# Patient Record
Sex: FEMALE | Race: WHITE | NOT HISPANIC OR LATINO | Employment: OTHER | URBAN - METROPOLITAN AREA
[De-identification: names, ages, dates, MRNs, and addresses within clinical notes are randomized per-mention and may not be internally consistent; named-entity substitution may affect disease eponyms.]

---

## 2023-11-29 ENCOUNTER — TRANSCRIBE ORDERS (OUTPATIENT)
Dept: ADMINISTRATIVE | Facility: HOSPITAL | Age: 83
End: 2023-11-29
Payer: MEDICARE

## 2023-11-29 DIAGNOSIS — M25.551 HIP PAIN, RIGHT: ICD-10-CM

## 2023-11-29 DIAGNOSIS — R59.9 ENLARGED LYMPH NODE: Primary | ICD-10-CM

## 2023-12-04 ENCOUNTER — HOSPITAL ENCOUNTER (OUTPATIENT)
Dept: GENERAL RADIOLOGY | Facility: HOSPITAL | Age: 83
Discharge: HOME | End: 2023-12-04
Payer: MEDICARE

## 2023-12-04 ENCOUNTER — HOSPITAL ENCOUNTER (OUTPATIENT)
Dept: CT IMAGING | Facility: HOSPITAL | Age: 83
Discharge: HOME | End: 2023-12-04
Admitting: INTERNAL MEDICINE
Payer: MEDICARE

## 2023-12-04 DIAGNOSIS — R59.9 ENLARGED LYMPH NODE: ICD-10-CM

## 2023-12-04 DIAGNOSIS — M25.551 HIP PAIN, RIGHT: ICD-10-CM

## 2023-12-04 PROCEDURE — 73502 X-RAY EXAM HIP UNI 2-3 VIEWS: CPT

## 2023-12-04 PROCEDURE — 70490 CT SOFT TISSUE NECK W/O DYE: CPT

## 2024-06-04 ENCOUNTER — TRANSCRIBE ORDERS (OUTPATIENT)
Dept: ADMINISTRATIVE | Facility: HOSPITAL | Age: 84
End: 2024-06-04
Payer: MEDICARE

## 2024-06-04 DIAGNOSIS — I73.9 CLAUDICATION, INTERMITTENT: ICD-10-CM

## 2024-06-04 DIAGNOSIS — I73.9 CLAUDICATION, INTERMITTENT: Primary | ICD-10-CM

## 2024-06-04 DIAGNOSIS — Z13.9 SCREENING DUE: Primary | ICD-10-CM

## 2024-06-07 ENCOUNTER — TRANSCRIBE ORDERS (OUTPATIENT)
Dept: ADMINISTRATIVE | Facility: HOSPITAL | Age: 84
End: 2024-06-07
Payer: MEDICARE

## 2024-06-07 DIAGNOSIS — I73.9 PERIPHERAL VASCULAR DISEASE, UNSPECIFIED: Primary | ICD-10-CM

## 2024-08-15 ENCOUNTER — OFFICE VISIT (OUTPATIENT)
Dept: CARDIOLOGY | Facility: CLINIC | Age: 84
End: 2024-08-15
Payer: MEDICARE

## 2024-08-15 VITALS
WEIGHT: 115 LBS | HEART RATE: 88 BPM | OXYGEN SATURATION: 95 % | BODY MASS INDEX: 21.16 KG/M2 | SYSTOLIC BLOOD PRESSURE: 135 MMHG | HEIGHT: 62 IN | DIASTOLIC BLOOD PRESSURE: 76 MMHG

## 2024-08-15 DIAGNOSIS — I10 ESSENTIAL HYPERTENSION: ICD-10-CM

## 2024-08-15 DIAGNOSIS — R06.02 SHORTNESS OF BREATH: Primary | ICD-10-CM

## 2024-08-15 PROCEDURE — 93000 ELECTROCARDIOGRAM COMPLETE: CPT | Performed by: INTERNAL MEDICINE

## 2024-08-15 PROCEDURE — 1160F RVW MEDS BY RX/DR IN RCRD: CPT | Performed by: INTERNAL MEDICINE

## 2024-08-15 PROCEDURE — 1159F MED LIST DOCD IN RCRD: CPT | Performed by: INTERNAL MEDICINE

## 2024-08-15 PROCEDURE — 99204 OFFICE O/P NEW MOD 45 MIN: CPT | Performed by: INTERNAL MEDICINE

## 2024-08-15 RX ORDER — ESCITALOPRAM OXALATE 20 MG/1
TABLET ORAL
COMMUNITY
Start: 2024-06-29

## 2024-08-15 RX ORDER — UREA 10 %
500 LOTION (ML) TOPICAL DAILY
COMMUNITY

## 2024-08-15 RX ORDER — AMLODIPINE BESYLATE 10 MG/1
TABLET ORAL
COMMUNITY
Start: 2024-06-29

## 2024-08-15 NOTE — PROGRESS NOTES
Encounter Date:08/15/2024      Patient ID: Madelyn Madrid is a 83 y.o. female.    Chief Complaint:  Shortness of breath  Hypertension    History of present illness  Patient lately has been having shortness of breath with or without activity.  Fatigue.  Denies having any chest pain.    Patient is not having any chest discomfort palpitations, dizziness or syncope.  Denies having any headache ,abdominal pain ,nausea, vomiting , diarrhea constipation, loss of weight or loss of appetite.  Denies having any excessive bruising ,hematuria or blood in the stool.    Review of all systems negative except as indicated.    Reviewed ROS.    Assessment and Plan     [[[[[[[[[[[[[[[[[[  Impression  ==========  -Shortness of breath and fatigue    - Hypertension    - Status post hysterectomy    - Family history of Alzheimer's disease    - Former smoker    - No known allergies  ============  Plan  ===========  Shortness of breath and fatigue.  EKG showed sinus rhythm left anterior fascicular block-8/15/2024.  Shortness of breath could be due to pulmonary issues.  Patient is a former smoker.    Hypertension-135/76.    Medications were reviewed and updated.    Stress Cardiolite test  Echocardiogram    Follow-up in the office on the same day or soon after.    Further plan will depend on patient's progress.   ]]]]]]]]]]]]]]]]]             Diagnosis Plan   1. Shortness of breath  Adult Transthoracic Echo Complete W/ Cont if Necessary Per Protocol    Stress Test With Myocardial Perfusion One Day      2. Essential hypertension  Adult Transthoracic Echo Complete W/ Cont if Necessary Per Protocol    Stress Test With Myocardial Perfusion One Day      LAB RESULTS (LAST 7 DAYS)    CBC        BMP        CMP         BNP        TROPONIN        CoAg        Creatinine Clearance  CrCl cannot be calculated (No successful lab value found.).    ABG        Radiology  No radiology results for the last day                The following portions of the  patient's history were reviewed and updated as appropriate: allergies, current medications, past family history, past medical history, past social history, past surgical history, and problem list.    Review of Systems   Constitutional: Positive for malaise/fatigue.   Cardiovascular:  Negative for chest pain, dyspnea on exertion, leg swelling and palpitations.   Respiratory:  Positive for shortness of breath. Negative for cough.    Gastrointestinal:  Negative for abdominal pain, nausea and vomiting.   Neurological:  Positive for dizziness, light-headedness and weakness. Negative for focal weakness, headaches and numbness.   All other systems reviewed and are negative.        Current Outpatient Medications:   •  amLODIPine (NORVASC) 10 MG tablet, , Disp: , Rfl:   •  Calcium Citrate-Vitamin D (Citrus Calcium/Vitamin D) 200-6.25 MG-MCG tablet, Take  by mouth., Disp: , Rfl:   •  escitalopram (LEXAPRO) 20 MG tablet, , Disp: , Rfl:   •  vitamin B-12 (CYANOCOBALAMIN) 500 MCG tablet, Take 1 tablet by mouth Daily., Disp: , Rfl:     No Known Allergies    Family History   Problem Relation Age of Onset   • Rectal cancer Mother    • Alzheimer's disease Mother    • Cancer Father    • Alzheimer's disease Sister    • Alzheimer's disease Brother    • Epilepsy Brother        Past Surgical History:   Procedure Laterality Date   • HYSTERECTOMY     • LAPAROSCOPY HYSTEROSCOPY ENDOMETRIAL ABLATION         Past Medical History:   Diagnosis Date   • Cancer    • Depression    • Goiter    • Hypertension    • Pancreatitis        Family History   Problem Relation Age of Onset   • Rectal cancer Mother    • Alzheimer's disease Mother    • Cancer Father    • Alzheimer's disease Sister    • Alzheimer's disease Brother    • Epilepsy Brother        Social History     Socioeconomic History   • Marital status: Legally    Tobacco Use   • Smoking status: Former     Types: Cigarettes     Passive exposure: Past   • Smokeless tobacco: Never   Vaping  "Use   • Vaping status: Never Used   Substance and Sexual Activity   • Alcohol use: Defer   • Drug use: Defer   • Sexual activity: Defer           ECG 12 Lead    Date/Time: 8/15/2024 3:15 PM  Performed by: Treva Grullon MD    Authorized by: Treva Grullon MD  Comparison: not compared with previous ECG   Comments: Normal sinus rhythm nonspecific ST-T wave changes left anterior fascicular block 84/min no ectopy no prior tracing for comparison.        Objective:       Physical Exam    /76 (BP Location: Left arm, Patient Position: Sitting, Cuff Size: Adult)   Pulse 88   Ht 157.5 cm (62\")   Wt 52.2 kg (115 lb)   SpO2 95%   BMI 21.03 kg/m²   The patient is alert, oriented and in no distress.    Vital signs as noted above.    Head and neck revealed no carotid bruits or jugular venous distension.  No thyromegaly or lymphadenopathy is present.    Lungs clear.  No wheezing.  Breath sounds are normal bilaterally.    Heart normal first and second heart sounds.  No murmur..  No pericardial rub is present.  No gallop is present.    Abdomen soft and nontender.  No organomegaly is present.    Extremities revealed good peripheral pulses without any pedal edema.    Skin warm and dry.    Musculoskeletal system is grossly normal.    CNS grossly normal.    Reviewed and updated.        "

## 2024-09-16 ENCOUNTER — HOSPITAL ENCOUNTER (OUTPATIENT)
Dept: CARDIOLOGY | Facility: HOSPITAL | Age: 84
Discharge: HOME | End: 2024-09-16
Payer: MEDICARE

## 2024-09-16 VITALS — HEIGHT: 62 IN | WEIGHT: 115 LBS | BODY MASS INDEX: 21.16 KG/M2

## 2024-09-16 DIAGNOSIS — I10 ESSENTIAL HYPERTENSION: ICD-10-CM

## 2024-09-16 DIAGNOSIS — R06.02 SHORTNESS OF BREATH: ICD-10-CM

## 2024-09-16 LAB
BH CV ECHO LEFT VENTRICLE GLOBAL LONGITUDINAL STRAIN: 16.8 %
BH CV ECHO MEAS - ACS: 1.2 CM
BH CV ECHO MEAS - AO MAX PG: 9.2 MMHG
BH CV ECHO MEAS - AO MEAN PG: 4.5 MMHG
BH CV ECHO MEAS - AO ROOT DIAM: 2.6 CM
BH CV ECHO MEAS - AO V2 MAX: 144.9 CM/SEC
BH CV ECHO MEAS - AO V2 VTI: 26.2 CM
BH CV ECHO MEAS - AVA(I,D): 1.94 CM2
BH CV ECHO MEAS - EDV(CUBED): 80.6 ML
BH CV ECHO MEAS - EDV(MOD-SP4): 82.1 ML
BH CV ECHO MEAS - EF(MOD-BP): 63 %
BH CV ECHO MEAS - EF(MOD-SP4): 62.9 %
BH CV ECHO MEAS - ESV(CUBED): 19.7 ML
BH CV ECHO MEAS - ESV(MOD-SP4): 30.5 ML
BH CV ECHO MEAS - FS: 37.5 %
BH CV ECHO MEAS - IVS/LVPW: 2.6 CM
BH CV ECHO MEAS - IVSD: 1.77 CM
BH CV ECHO MEAS - LA DIMENSION: 3.3 CM
BH CV ECHO MEAS - LV MASS(C)D: 192.3 GRAMS
BH CV ECHO MEAS - LV MAX PG: 4.4 MMHG
BH CV ECHO MEAS - LV MEAN PG: 2.11 MMHG
BH CV ECHO MEAS - LV V1 MAX: 104.6 CM/SEC
BH CV ECHO MEAS - LV V1 VTI: 21.2 CM
BH CV ECHO MEAS - LVIDD: 4.3 CM
BH CV ECHO MEAS - LVIDS: 2.7 CM
BH CV ECHO MEAS - LVOT AREA: 2.39 CM2
BH CV ECHO MEAS - LVOT DIAM: 1.75 CM
BH CV ECHO MEAS - LVPWD: 0.69 CM
BH CV ECHO MEAS - MR MAX PG: 78.8 MMHG
BH CV ECHO MEAS - MR MAX VEL: 443.8 CM/SEC
BH CV ECHO MEAS - MV A MAX VEL: 98.3 CM/SEC
BH CV ECHO MEAS - MV DEC SLOPE: 312.2 CM/SEC2
BH CV ECHO MEAS - MV DEC TIME: 0.24 SEC
BH CV ECHO MEAS - MV E MAX VEL: 75.7 CM/SEC
BH CV ECHO MEAS - MV E/A: 0.77
BH CV ECHO MEAS - MV MAX PG: 6.3 MMHG
BH CV ECHO MEAS - MV MEAN PG: 3.4 MMHG
BH CV ECHO MEAS - MV V2 VTI: 32.2 CM
BH CV ECHO MEAS - MVA(VTI): 1.58 CM2
BH CV ECHO MEAS - PA ACC TIME: 0.12 SEC
BH CV ECHO MEAS - PA V2 MAX: 118 CM/SEC
BH CV ECHO MEAS - QP/QS: 1.64
BH CV ECHO MEAS - RAP SYSTOLE: 5 MMHG
BH CV ECHO MEAS - RV MAX PG: 3.1 MMHG
BH CV ECHO MEAS - RV V1 MAX: 88.3 CM/SEC
BH CV ECHO MEAS - RV V1 VTI: 17.3 CM
BH CV ECHO MEAS - RVDD: 2.39 CM
BH CV ECHO MEAS - RVOT DIAM: 2.48 CM
BH CV ECHO MEAS - RVSP: 35 MMHG
BH CV ECHO MEAS - SV(LVOT): 50.9 ML
BH CV ECHO MEAS - SV(MOD-SP4): 51.6 ML
BH CV ECHO MEAS - SV(RVOT): 83.5 ML
BH CV ECHO MEAS - TR MAX PG: 29.7 MMHG
BH CV ECHO MEAS - TR MAX VEL: 272.4 CM/SEC
BH CV REST NUCLEAR ISOTOPE DOSE: 10.1 MCI
BH CV STRESS BP STAGE 1: NORMAL
BH CV STRESS COMMENTS STAGE 1: NORMAL
BH CV STRESS DOSE REGADENOSON STAGE 1: 0.4
BH CV STRESS DURATION MIN STAGE 1: 0
BH CV STRESS DURATION SEC STAGE 1: 10
BH CV STRESS HR STAGE 1: 81
BH CV STRESS NUCLEAR ISOTOPE DOSE: 31.8 MCI
BH CV STRESS PROTOCOL 1: NORMAL
BH CV STRESS RECOVERY BP: NORMAL MMHG
BH CV STRESS RECOVERY HR: 102 BPM
BH CV STRESS STAGE 1: 1
MAXIMAL PREDICTED HEART RATE: 138 BPM
STRESS BASELINE BP: NORMAL MMHG
STRESS BASELINE HR: 81 BPM
STRESS TARGET HR: 117 BPM

## 2024-09-16 PROCEDURE — 93016 CV STRESS TEST SUPVJ ONLY: CPT | Performed by: INTERNAL MEDICINE

## 2024-09-16 PROCEDURE — 93356 MYOCRD STRAIN IMG SPCKL TRCK: CPT | Performed by: INTERNAL MEDICINE

## 2024-09-16 PROCEDURE — 93356 MYOCRD STRAIN IMG SPCKL TRCK: CPT

## 2024-09-16 PROCEDURE — 25010000002 REGADENOSON 0.4 MG/5ML SOLUTION: Performed by: INTERNAL MEDICINE

## 2024-09-16 PROCEDURE — 0 TECHNETIUM SESTAMIBI: Performed by: INTERNAL MEDICINE

## 2024-09-16 PROCEDURE — 93306 TTE W/DOPPLER COMPLETE: CPT

## 2024-09-16 PROCEDURE — A9500 TC99M SESTAMIBI: HCPCS | Performed by: INTERNAL MEDICINE

## 2024-09-16 PROCEDURE — 93018 CV STRESS TEST I&R ONLY: CPT | Performed by: INTERNAL MEDICINE

## 2024-09-16 PROCEDURE — 78452 HT MUSCLE IMAGE SPECT MULT: CPT

## 2024-09-16 PROCEDURE — 93306 TTE W/DOPPLER COMPLETE: CPT | Performed by: INTERNAL MEDICINE

## 2024-09-16 PROCEDURE — 78452 HT MUSCLE IMAGE SPECT MULT: CPT | Performed by: INTERNAL MEDICINE

## 2024-09-16 PROCEDURE — 93017 CV STRESS TEST TRACING ONLY: CPT

## 2024-09-16 RX ORDER — REGADENOSON 0.08 MG/ML
0.4 INJECTION, SOLUTION INTRAVENOUS
Status: COMPLETED | OUTPATIENT
Start: 2024-09-16 | End: 2024-09-16

## 2024-09-16 RX ADMIN — TECHNETIUM TC 99M SESTAMIBI 1 DOSE: 1 INJECTION INTRAVENOUS at 14:22

## 2024-09-16 RX ADMIN — REGADENOSON 0.4 MG: 0.08 INJECTION, SOLUTION INTRAVENOUS at 14:21

## 2024-09-16 RX ADMIN — TECHNETIUM TC 99M SESTAMIBI 1 DOSE: 1 INJECTION INTRAVENOUS at 12:20

## 2024-09-24 ENCOUNTER — TELEPHONE (OUTPATIENT)
Dept: ONCOLOGY | Facility: CLINIC | Age: 84
End: 2024-09-24
Payer: MEDICARE

## 2024-09-25 ENCOUNTER — LAB (OUTPATIENT)
Dept: LAB | Facility: HOSPITAL | Age: 84
End: 2024-09-25
Payer: MEDICARE

## 2024-09-25 ENCOUNTER — CONSULT (OUTPATIENT)
Dept: ONCOLOGY | Facility: CLINIC | Age: 84
End: 2024-09-25
Payer: MEDICARE

## 2024-09-25 VITALS
BODY MASS INDEX: 20.13 KG/M2 | HEIGHT: 62 IN | HEART RATE: 94 BPM | DIASTOLIC BLOOD PRESSURE: 86 MMHG | RESPIRATION RATE: 14 BRPM | TEMPERATURE: 98 F | SYSTOLIC BLOOD PRESSURE: 140 MMHG | WEIGHT: 109.4 LBS | OXYGEN SATURATION: 95 %

## 2024-09-25 DIAGNOSIS — S49.009A: ICD-10-CM

## 2024-09-25 DIAGNOSIS — M89.9 LYTIC BONE LESIONS ON XRAY: Primary | ICD-10-CM

## 2024-09-25 DIAGNOSIS — M89.9 LYTIC BONE LESIONS ON XRAY: ICD-10-CM

## 2024-09-25 LAB
ALBUMIN SERPL-MCNC: 4.1 G/DL (ref 3.5–5.2)
ALBUMIN/GLOB SERPL: 1.1 G/DL
ALP SERPL-CCNC: 105 U/L (ref 39–117)
ALT SERPL W P-5'-P-CCNC: <5 U/L (ref 1–33)
ANION GAP SERPL CALCULATED.3IONS-SCNC: 11.4 MMOL/L (ref 5–15)
AST SERPL-CCNC: 18 U/L (ref 1–32)
BASOPHILS # BLD AUTO: 0.02 10*3/MM3 (ref 0–0.2)
BASOPHILS NFR BLD AUTO: 0.3 % (ref 0–1.5)
BILIRUB SERPL-MCNC: 0.3 MG/DL (ref 0–1.2)
BUN SERPL-MCNC: 11 MG/DL (ref 8–23)
BUN/CREAT SERPL: 11.2 (ref 7–25)
CALCIUM SPEC-SCNC: 9.6 MG/DL (ref 8.6–10.5)
CHLORIDE SERPL-SCNC: 101 MMOL/L (ref 98–107)
CO2 SERPL-SCNC: 28.6 MMOL/L (ref 22–29)
CREAT SERPL-MCNC: 0.98 MG/DL (ref 0.57–1)
DEPRECATED RDW RBC AUTO: 42.4 FL (ref 37–54)
EGFRCR SERPLBLD CKD-EPI 2021: 57.7 ML/MIN/1.73
EOSINOPHIL # BLD AUTO: 0.03 10*3/MM3 (ref 0–0.4)
EOSINOPHIL NFR BLD AUTO: 0.4 % (ref 0.3–6.2)
ERYTHROCYTE [DISTWIDTH] IN BLOOD BY AUTOMATED COUNT: 13.9 % (ref 12.3–15.4)
GLOBULIN UR ELPH-MCNC: 3.6 GM/DL
GLUCOSE SERPL-MCNC: 105 MG/DL (ref 65–99)
HCT VFR BLD AUTO: 39.1 % (ref 34–46.6)
HGB BLD-MCNC: 12.1 G/DL (ref 12–15.9)
LYMPHOCYTES # BLD AUTO: 1.15 10*3/MM3 (ref 0.7–3.1)
LYMPHOCYTES NFR BLD AUTO: 15.5 % (ref 19.6–45.3)
MCH RBC QN AUTO: 26.4 PG (ref 26.6–33)
MCHC RBC AUTO-ENTMCNC: 30.9 G/DL (ref 31.5–35.7)
MCV RBC AUTO: 85.2 FL (ref 79–97)
MONOCYTES # BLD AUTO: 0.81 10*3/MM3 (ref 0.1–0.9)
MONOCYTES NFR BLD AUTO: 10.9 % (ref 5–12)
NEUTROPHILS NFR BLD AUTO: 5.42 10*3/MM3 (ref 1.7–7)
NEUTROPHILS NFR BLD AUTO: 72.9 % (ref 42.7–76)
PLATELET # BLD AUTO: 385 10*3/MM3 (ref 140–450)
PMV BLD AUTO: 9 FL (ref 6–12)
POTASSIUM SERPL-SCNC: 3.5 MMOL/L (ref 3.5–5.2)
PROT SERPL-MCNC: 7.7 G/DL (ref 6–8.5)
RBC # BLD AUTO: 4.59 10*6/MM3 (ref 3.77–5.28)
SODIUM SERPL-SCNC: 141 MMOL/L (ref 136–145)
WBC NRBC COR # BLD AUTO: 7.43 10*3/MM3 (ref 3.4–10.8)

## 2024-09-25 PROCEDURE — 85025 COMPLETE CBC W/AUTO DIFF WBC: CPT | Performed by: INTERNAL MEDICINE

## 2024-09-25 PROCEDURE — 36415 COLL VENOUS BLD VENIPUNCTURE: CPT

## 2024-09-25 PROCEDURE — 80053 COMPREHEN METABOLIC PANEL: CPT | Performed by: INTERNAL MEDICINE

## 2024-09-25 RX ORDER — MELOXICAM 7.5 MG/1
7.5 TABLET ORAL DAILY
COMMUNITY

## 2024-09-26 ENCOUNTER — TELEPHONE (OUTPATIENT)
Dept: ONCOLOGY | Facility: CLINIC | Age: 84
End: 2024-09-26
Payer: MEDICARE

## 2024-09-26 LAB
ALBUMIN SERPL ELPH-MCNC: 3.3 G/DL (ref 2.9–4.4)
ALBUMIN/GLOB SERPL: 0.9 {RATIO} (ref 0.7–1.7)
ALPHA1 GLOB SERPL ELPH-MCNC: 0.4 G/DL (ref 0–0.4)
ALPHA2 GLOB SERPL ELPH-MCNC: 1.1 G/DL (ref 0.4–1)
B-GLOBULIN SERPL ELPH-MCNC: 1 G/DL (ref 0.7–1.3)
GAMMA GLOB SERPL ELPH-MCNC: 1.5 G/DL (ref 0.4–1.8)
GLOBULIN SER-MCNC: 4 G/DL (ref 2.2–3.9)
IGA SERPL-MCNC: 236 MG/DL (ref 64–422)
IGG SERPL-MCNC: 1464 MG/DL (ref 586–1602)
IGM SERPL-MCNC: 307 MG/DL (ref 26–217)
INTERPRETATION SERPL IEP-IMP: ABNORMAL
KAPPA LC FREE SER-MCNC: 66.8 MG/L (ref 3.3–19.4)
KAPPA LC FREE/LAMBDA FREE SER: 1.42 {RATIO} (ref 0.26–1.65)
LABORATORY COMMENT REPORT: ABNORMAL
LAMBDA LC FREE SERPL-MCNC: 47.2 MG/L (ref 5.7–26.3)
M PROTEIN SERPL ELPH-MCNC: ABNORMAL G/DL
PROT SERPL-MCNC: 7.3 G/DL (ref 6–8.5)

## 2024-09-27 ENCOUNTER — TELEPHONE (OUTPATIENT)
Dept: ONCOLOGY | Facility: CLINIC | Age: 84
End: 2024-09-27
Payer: MEDICARE

## 2024-09-30 ENCOUNTER — OFFICE VISIT (OUTPATIENT)
Dept: CARDIOLOGY | Facility: CLINIC | Age: 84
End: 2024-09-30
Payer: MEDICARE

## 2024-09-30 VITALS
SYSTOLIC BLOOD PRESSURE: 128 MMHG | OXYGEN SATURATION: 98 % | WEIGHT: 110 LBS | DIASTOLIC BLOOD PRESSURE: 74 MMHG | HEIGHT: 62 IN | BODY MASS INDEX: 20.24 KG/M2 | HEART RATE: 92 BPM

## 2024-09-30 DIAGNOSIS — I10 ESSENTIAL HYPERTENSION: ICD-10-CM

## 2024-09-30 DIAGNOSIS — R06.02 SHORTNESS OF BREATH: Primary | ICD-10-CM

## 2024-09-30 PROCEDURE — 1159F MED LIST DOCD IN RCRD: CPT | Performed by: INTERNAL MEDICINE

## 2024-09-30 PROCEDURE — 99214 OFFICE O/P EST MOD 30 MIN: CPT | Performed by: INTERNAL MEDICINE

## 2024-09-30 PROCEDURE — 1160F RVW MEDS BY RX/DR IN RCRD: CPT | Performed by: INTERNAL MEDICINE

## 2024-09-30 RX ORDER — ACETAMINOPHEN 325 MG/1
650 TABLET ORAL AS NEEDED
COMMUNITY

## 2024-10-01 ENCOUNTER — APPOINTMENT (OUTPATIENT)
Dept: NUCLEAR MEDICINE | Facility: HOSPITAL | Age: 84
End: 2024-10-01
Payer: MEDICARE

## 2024-10-02 ENCOUNTER — HOSPITAL ENCOUNTER (OUTPATIENT)
Dept: CT IMAGING | Facility: HOSPITAL | Age: 84
Discharge: HOME | End: 2024-10-02
Admitting: INTERNAL MEDICINE
Payer: MEDICARE

## 2024-10-02 ENCOUNTER — HOSPITAL ENCOUNTER (OUTPATIENT)
Dept: NUCLEAR MEDICINE | Facility: HOSPITAL | Age: 84
Discharge: HOME | End: 2024-10-02
Payer: MEDICARE

## 2024-10-02 DIAGNOSIS — S49.009A: ICD-10-CM

## 2024-10-02 DIAGNOSIS — M89.9 LYTIC BONE LESIONS ON XRAY: ICD-10-CM

## 2024-10-02 PROCEDURE — 78306 BONE IMAGING WHOLE BODY: CPT

## 2024-10-02 PROCEDURE — 25510000001 IOPAMIDOL PER 1 ML: Performed by: INTERNAL MEDICINE

## 2024-10-02 PROCEDURE — 71260 CT THORAX DX C+: CPT

## 2024-10-02 PROCEDURE — 74177 CT ABD & PELVIS W/CONTRAST: CPT

## 2024-10-02 PROCEDURE — A9503 TC99M MEDRONATE: HCPCS | Performed by: INTERNAL MEDICINE

## 2024-10-02 PROCEDURE — 0 TECHNETIUM MEDRONATE KIT: Performed by: INTERNAL MEDICINE

## 2024-10-02 RX ORDER — IOPAMIDOL 755 MG/ML
100 INJECTION, SOLUTION INTRAVASCULAR
Status: COMPLETED | OUTPATIENT
Start: 2024-10-02 | End: 2024-10-02

## 2024-10-02 RX ORDER — TC 99M MEDRONATE 20 MG/10ML
27.3 INJECTION, POWDER, LYOPHILIZED, FOR SOLUTION INTRAVENOUS
Status: COMPLETED | OUTPATIENT
Start: 2024-10-02 | End: 2024-10-02

## 2024-10-02 RX ADMIN — TC 99M MEDRONATE 27.3 MILLICURIE: 20 INJECTION, POWDER, LYOPHILIZED, FOR SOLUTION INTRAVENOUS at 09:00

## 2024-10-02 RX ADMIN — IOPAMIDOL 80 ML: 755 INJECTION, SOLUTION INTRAVENOUS at 10:41

## 2024-10-08 NOTE — PROGRESS NOTES
HEMATOLOGY ONCOLOGY OUTPATIENT FOLLOWUP       Patient name: Madelyn Madrid  : 1940  MRN: [id number]  Primary Care Physician: Therese Martinez MD  Referring Physician: No ref. provider found  Reason For Consult: Pathologic fracture of the right humerus.    History of Present Illness:  Patient is a 84 y.o.     2024: In the office for the first time to investigate a pathologic fracture of the right humerus.  She dated the beginning of her present illness to sometime approximately a month to 2 months before this visit.  She started to have some discomfort, initially in the right hip and eventually in the right upper extremity.  It was difficult to localize the pain and it was not very intense until 1 morning, when she woke up with very intense pain in the right upper extremity, radiating to the chest.  She went to the primary care's office where she had some x-rays of the chest.  This reported nothing.  Eventually she sought attention from a different office where an x-ray of the right upper extremity was obtained and this time and expansile lesion was documented in the proximal right humerus, with an associated nondisplaced pathologic fracture.  On the right questioning she denied fevers.  She admitted to some nocturnal diaphoresis but it was not particularly worse at the time of this visit.  She had lost some weight and reported between 10 and 15 pounds in the previous 4 to 8 weeks.  She did not have any nausea or vomiting but admitted to some anorexia.  No chest pains, increasing cough or dyspnea.  No changes in her breast.  Denied abdominal pain.  Had not had melena or hematochezia and denied hematuria.  No edema.  She reported that her last mammogram had been many years before.  She had not had a colonoscopy, as well in many years.  She had had a hysterectomy for endometrial cancer more than 20 years before this visit.  On exam she appeared well-built and well-oriented.  She  did not seem ill or in distress.  No jaundice or pallor.  The breasts were symmetrically pendulous.  No ulceration or dimpling of the skin.  No nipple discharge on either side.  Palpation did not disclose a discrete nodule or tumor.  Lungs diminished bilaterally and heart regular.  Abdomen scaphoid.  Soft.  Not tender to palpation.  Liver and spleen not enlarged.  No edema.  The laboratory exams were reviewed.  I reviewed the notes and imaging studies from primary care.  Discussed with her.  Additional investigations will be obtained.    10/9/2024: In the office for follow up and to review the laboratory and imaging studies. She does not feel as well as before. She has had more pain and now complains of some discomfort in the mid back. She has been eating well. No fevers. Denies chest pain or abdominal pain. No diarrhea or dysuria. On exam appears chronically ill. No distress. No jaundice or pallor. Lungs diminished bilaterally and heart regular. Abdomen soft and without hepatomegaly or splenomegaly. No edema. Reviewed the laboratory exams. Reviewed the images and the reports of the imaging studies. Reviewed the images with her and her spouse. My clinical impression is that indeed she has metastatic lung cancer, however, metastatic endometrial cancer  is also a possibility. Needs a biopsy and have referred her to Dr. Iqbal for bronchoscopic biopsy. Prescribed tramadol and gave instructions. To see me with results.     Past Medical History:   Diagnosis Date    Depression     Endometrial cancer     Goiter     Hypertension     Pancreatitis      Past Surgical History:   Procedure Laterality Date    HYSTERECTOMY      LAPAROSCOPY HYSTEROSCOPY ENDOMETRIAL ABLATION         Current Outpatient Medications:     acetaminophen (Tylenol) 325 MG tablet, Take 2 tablets by mouth As Needed for Mild Pain., Disp: , Rfl:     amLODIPine (NORVASC) 10 MG tablet, , Disp: , Rfl:     escitalopram (LEXAPRO) 20 MG tablet, , Disp: , Rfl:      OMEPRAZOLE PO, Take  by mouth., Disp: , Rfl:     Calcium Citrate-Vitamin D (Citrus Calcium/Vitamin D) 200-6.25 MG-MCG tablet, Take  by mouth. (Patient not taking: Reported on 10/9/2024), Disp: , Rfl:     meloxicam (MOBIC) 7.5 MG tablet, Take 1 tablet by mouth Daily. (Patient not taking: Reported on 10/9/2024), Disp: , Rfl:     traMADol (ULTRAM) 50 MG tablet, Take 1 tablet by mouth Every 6 (Six) Hours As Needed for Moderate Pain., Disp: 120 tablet, Rfl: 0    vitamin B-12 (CYANOCOBALAMIN) 500 MCG tablet, Take 1 tablet by mouth Daily. (Patient not taking: Reported on 10/9/2024), Disp: , Rfl:     Allergies   Allergen Reactions    Other GI Intolerance     Opioids--     Family History   Problem Relation Age of Onset    Rectal cancer Mother     Alzheimer's disease Mother     Alzheimer's disease Sister     Alzheimer's disease Brother     Epilepsy Brother      Cancer-related family history includes Rectal cancer in her mother.    Social History     Tobacco Use    Smoking status: Former     Current packs/day: 0.00     Average packs/day: 1 pack/day for 40.0 years (40.0 ttl pk-yrs)     Types: Cigarettes     Start date:      Quit date:      Years since quittin.7     Passive exposure: Past    Smokeless tobacco: Never   Vaping Use    Vaping status: Never Used   Substance Use Topics    Alcohol use: Defer    Drug use: Defer     Social History     Social History Narrative    Not on file     ROS:   Review of Systems   Constitutional:  Positive for appetite change and unexpected weight change. Negative for activity change, chills, diaphoresis, fatigue and fever.   HENT:  Negative for congestion, dental problem, drooling, ear discharge, ear pain, facial swelling, hearing loss, mouth sores, nosebleeds, postnasal drip, rhinorrhea, sinus pressure, sinus pain, sneezing, sore throat, tinnitus, trouble swallowing and voice change.    Eyes:  Negative for photophobia, pain, discharge, redness, itching and visual disturbance.  "  Respiratory:  Negative for apnea, cough, choking, chest tightness, shortness of breath, wheezing and stridor.    Cardiovascular:  Negative for chest pain, palpitations and leg swelling.   Gastrointestinal:  Negative for abdominal distention, abdominal pain, anal bleeding, blood in stool, constipation, diarrhea, nausea, rectal pain and vomiting.   Endocrine: Negative for cold intolerance, heat intolerance, polydipsia and polyuria.   Genitourinary:  Negative for decreased urine volume, difficulty urinating, dysuria, flank pain, frequency, genital sores, hematuria and urgency.   Musculoskeletal:  Positive for arthralgias and myalgias. Negative for back pain, gait problem, joint swelling, neck pain and neck stiffness.   Skin:  Negative for color change, pallor and rash.   Neurological:  Negative for dizziness, tremors, seizures, syncope, facial asymmetry, speech difficulty, weakness, light-headedness, numbness and headaches.   Hematological:  Negative for adenopathy. Does not bruise/bleed easily.   Psychiatric/Behavioral:  Negative for agitation, behavioral problems, confusion, decreased concentration, hallucinations, self-injury, sleep disturbance and suicidal ideas. The patient is not nervous/anxious.      Objective:    Vital Signs:  Vitals:    10/09/24 1343   BP: 135/84   Pulse: 107   Resp: 14   Temp: 98.4 °F (36.9 °C)   SpO2: 96%   Weight: 49.7 kg (109 lb 9.6 oz)   Height: 157.5 cm (62\")   PainSc: 0-No pain     Body mass index is 20.05 kg/m².    ECOG  (1) Restricted in physically strenuous activity, ambulatory and able to do work of light nature    Physical Exam:   Physical Exam  Constitutional:       General: She is not in acute distress.     Appearance: She is not ill-appearing, toxic-appearing or diaphoretic.   HENT:      Head: Normocephalic and atraumatic.      Right Ear: External ear normal.      Left Ear: External ear normal.      Nose: Nose normal.      Mouth/Throat:      Mouth: Mucous membranes are moist. "      Pharynx: Oropharynx is clear. No oropharyngeal exudate or posterior oropharyngeal erythema.   Eyes:      General: No scleral icterus.        Right eye: No discharge.         Left eye: No discharge.      Conjunctiva/sclera: Conjunctivae normal.      Pupils: Pupils are equal, round, and reactive to light.   Cardiovascular:      Rate and Rhythm: Normal rate and regular rhythm.      Pulses: Normal pulses.      Heart sounds: No murmur heard.     No friction rub. No gallop.   Pulmonary:      Effort: No respiratory distress.      Breath sounds: No stridor. No wheezing, rhonchi or rales.      Comments: Ill appearing and slender woman. No distress.   Chest:      Comments: Breasts symmetrical he pendulous without ulceration or dimpling of the skin.  No nipple lesions or discharge.  Palpation disclosed no discrete tumors.  No axillary adenopathy on either side.  Abdominal:      General: Bowel sounds are normal. There is no distension.      Palpations: Abdomen is soft. There is no mass.      Tenderness: There is no abdominal tenderness. There is no right CVA tenderness, left CVA tenderness, guarding or rebound.      Hernia: No hernia is present.      Comments: Scaphoid, soft, not tender.  Liver and spleen not seemingly enlarged.   Musculoskeletal:         General: No tenderness, deformity or signs of injury.      Cervical back: No rigidity.      Right lower leg: No edema.      Left lower leg: No edema.      Comments: Reduced mobility of the right upper extremity.   Lymphadenopathy:      Cervical: No cervical adenopathy.   Skin:     Coloration: Skin is not jaundiced or pale.      Findings: No bruising, lesion or rash.   Neurological:      General: No focal deficit present.      Mental Status: She is alert and oriented to person, place, and time.      Cranial Nerves: No cranial nerve deficit.   Psychiatric:         Mood and Affect: Mood normal.         Behavior: Behavior normal.         Thought Content: Thought content  normal.         Judgment: Judgment normal.     Urbano Ghosh MD performed the physical exam on 10/9/2024 as documented above.     Assessment & Plan     1.  Metastatic malignancy: Appears to be a lung malignancy. Discussed with her at length. She is to have a biopsy in the near future.   2.  Reviewed all the laboratory exams. Reviewed the images of the recent scans. Discussed with her and her spouse.   3.  She is to see me with results.     Urbano Ghosh MD on 10/9/2024 at 17:59

## 2024-10-09 ENCOUNTER — LAB (OUTPATIENT)
Dept: LAB | Facility: HOSPITAL | Age: 84
End: 2024-10-09
Payer: MEDICARE

## 2024-10-09 ENCOUNTER — OFFICE VISIT (OUTPATIENT)
Dept: ONCOLOGY | Facility: CLINIC | Age: 84
End: 2024-10-09
Payer: MEDICARE

## 2024-10-09 ENCOUNTER — PATIENT OUTREACH (OUTPATIENT)
Dept: ONCOLOGY | Facility: CLINIC | Age: 84
End: 2024-10-09
Payer: MEDICARE

## 2024-10-09 VITALS
WEIGHT: 109.6 LBS | HEART RATE: 107 BPM | HEIGHT: 62 IN | RESPIRATION RATE: 14 BRPM | DIASTOLIC BLOOD PRESSURE: 84 MMHG | BODY MASS INDEX: 20.17 KG/M2 | TEMPERATURE: 98.4 F | OXYGEN SATURATION: 96 % | SYSTOLIC BLOOD PRESSURE: 135 MMHG

## 2024-10-09 DIAGNOSIS — M89.9 LYTIC BONE LESIONS ON XRAY: Primary | ICD-10-CM

## 2024-10-09 DIAGNOSIS — G89.3 CANCER ASSOCIATED PAIN: ICD-10-CM

## 2024-10-09 DIAGNOSIS — D49.1 LUNG TUMOR: Primary | ICD-10-CM

## 2024-10-09 DIAGNOSIS — M89.9 LYTIC BONE LESIONS ON XRAY: ICD-10-CM

## 2024-10-09 LAB
BASOPHILS # BLD AUTO: 0.01 10*3/MM3 (ref 0–0.2)
BASOPHILS NFR BLD AUTO: 0.1 % (ref 0–1.5)
DEPRECATED RDW RBC AUTO: 41.5 FL (ref 37–54)
EOSINOPHIL # BLD AUTO: 0.02 10*3/MM3 (ref 0–0.4)
EOSINOPHIL NFR BLD AUTO: 0.2 % (ref 0.3–6.2)
ERYTHROCYTE [DISTWIDTH] IN BLOOD BY AUTOMATED COUNT: 13.8 % (ref 12.3–15.4)
HCT VFR BLD AUTO: 39.7 % (ref 34–46.6)
HGB BLD-MCNC: 12.4 G/DL (ref 12–15.9)
HOLD SPECIMEN: NORMAL
HOLD SPECIMEN: NORMAL
LYMPHOCYTES # BLD AUTO: 1.29 10*3/MM3 (ref 0.7–3.1)
LYMPHOCYTES NFR BLD AUTO: 13.9 % (ref 19.6–45.3)
MCH RBC QN AUTO: 26.1 PG (ref 26.6–33)
MCHC RBC AUTO-ENTMCNC: 31.2 G/DL (ref 31.5–35.7)
MCV RBC AUTO: 83.6 FL (ref 79–97)
MONOCYTES # BLD AUTO: 0.99 10*3/MM3 (ref 0.1–0.9)
MONOCYTES NFR BLD AUTO: 10.7 % (ref 5–12)
NEUTROPHILS NFR BLD AUTO: 6.97 10*3/MM3 (ref 1.7–7)
NEUTROPHILS NFR BLD AUTO: 75.1 % (ref 42.7–76)
PLATELET # BLD AUTO: 361 10*3/MM3 (ref 140–450)
PMV BLD AUTO: 9.3 FL (ref 6–12)
RBC # BLD AUTO: 4.75 10*6/MM3 (ref 3.77–5.28)
WBC NRBC COR # BLD AUTO: 9.28 10*3/MM3 (ref 3.4–10.8)

## 2024-10-09 PROCEDURE — 85025 COMPLETE CBC W/AUTO DIFF WBC: CPT | Performed by: INTERNAL MEDICINE

## 2024-10-09 PROCEDURE — 36415 COLL VENOUS BLD VENIPUNCTURE: CPT

## 2024-10-09 PROCEDURE — 99214 OFFICE O/P EST MOD 30 MIN: CPT | Performed by: INTERNAL MEDICINE

## 2024-10-09 PROCEDURE — 1126F AMNT PAIN NOTED NONE PRSNT: CPT | Performed by: INTERNAL MEDICINE

## 2024-10-09 PROCEDURE — 1160F RVW MEDS BY RX/DR IN RCRD: CPT | Performed by: INTERNAL MEDICINE

## 2024-10-09 PROCEDURE — 1159F MED LIST DOCD IN RCRD: CPT | Performed by: INTERNAL MEDICINE

## 2024-10-09 RX ORDER — TRAMADOL HYDROCHLORIDE 50 MG/1
50 TABLET ORAL EVERY 6 HOURS PRN
Qty: 120 TABLET | Refills: 0 | Status: SHIPPED | OUTPATIENT
Start: 2024-10-09

## 2024-10-16 ENCOUNTER — PATIENT OUTREACH (OUTPATIENT)
Dept: ONCOLOGY | Facility: CLINIC | Age: 84
End: 2024-10-16
Payer: MEDICARE

## 2024-10-16 ENCOUNTER — OFFICE VISIT (OUTPATIENT)
Dept: SURGERY | Facility: CLINIC | Age: 84
End: 2024-10-16
Payer: MEDICARE

## 2024-10-16 VITALS
OXYGEN SATURATION: 98 % | DIASTOLIC BLOOD PRESSURE: 71 MMHG | BODY MASS INDEX: 20.43 KG/M2 | SYSTOLIC BLOOD PRESSURE: 136 MMHG | HEIGHT: 62 IN | HEART RATE: 82 BPM | WEIGHT: 111 LBS

## 2024-10-16 DIAGNOSIS — R91.8 LUNG MASS: Primary | ICD-10-CM

## 2024-10-16 PROCEDURE — 99204 OFFICE O/P NEW MOD 45 MIN: CPT | Performed by: THORACIC SURGERY (CARDIOTHORACIC VASCULAR SURGERY)

## 2024-10-16 PROCEDURE — 1160F RVW MEDS BY RX/DR IN RCRD: CPT | Performed by: THORACIC SURGERY (CARDIOTHORACIC VASCULAR SURGERY)

## 2024-10-16 PROCEDURE — 1159F MED LIST DOCD IN RCRD: CPT | Performed by: THORACIC SURGERY (CARDIOTHORACIC VASCULAR SURGERY)

## 2024-10-16 NOTE — SIGNIFICANT NOTE
I accompanied patient and  to Dr. Iqbal's office visit    Dr. Iqbal reviewed medical history, scan images and next steps. Will need PET scan , MRI brain, Ct ion, ION bronch and referral for rad onc for radiation to bones. All questions answered. PET and CT ion scheduled for 10/22. Patient informed of all instructions and location.

## 2024-10-16 NOTE — LETTER
"October 31, 2024     Therese Martinez MD  [address]  [place] [address]    Patient: Madelyn Madrid   YOB: 1940   Date of Visit: 10/16/2024     Dear Therese Martinez MD:       Thank you for referring Madelyn Madrid to me for evaluation. Below are the relevant portions of my assessment and plan of care.    If you have questions, please do not hesitate to call me. I look forward to following Madelyn along with you.         Sincerely,        Deann Iqbal MD        CC: No Recipients    Deann Iqbal MD  10/31/24 1549  Sign when Signing Visit  Chief Complaint  Lung Cancer (New patient Ref Dr. Ghosh, Lung cancer)    Subjective       Madelyn Madrid presents to [facility] THORACIC SURGERY  History of Present Illness  Ms. Madrid is a pleasant 84-year-old lady who presents with a right upper extremity fracture who was found to have multiple osseous metastasis as well as a lung mass.  She was referred here for biopsy.  She was in her usual state of health when she began to experience severe pain in her right upper extremity.    She is a former smoker with a greater than 40-pack-year history of tobacco use who quit in 1999.  She has a personal history of endometrial cancer.  She has a family history of rectal cancer in her mother and unknown cancer in her father.  Objective  Vital Signs:  /71 (BP Location: Left arm, Patient Position: Sitting, Cuff Size: Adult)   Pulse 82   Ht 157.5 cm (62\")   Wt 50.3 kg (111 lb)   SpO2 98%   BMI 20.30 kg/m²   Estimated body mass index is 20.3 kg/m² as calculated from the following:    Height as of this encounter: 157.5 cm (62\").    Weight as of this encounter: 50.3 kg (111 lb).    BMI is within normal parameters. No other follow-up for BMI required.      Physical Exam  Vitals and nursing note reviewed.   Constitutional:       Appearance: She is well-developed.   HENT:      Head: Normocephalic and atraumatic.      Nose: Nose " normal.   Eyes:      Conjunctiva/sclera: Conjunctivae normal.   Cardiovascular:      Rate and Rhythm: Normal rate.   Pulmonary:      Effort: Pulmonary effort is normal.   Abdominal:      Palpations: Abdomen is soft.   Musculoskeletal:      Cervical back: Neck supple.   Skin:     General: Skin is warm and dry.   Neurological:      Mental Status: She is alert and oriented to person, place, and time.   Psychiatric:         Behavior: Behavior normal.         Thought Content: Thought content normal.         Judgment: Judgment normal.        Result Review:          I have independently reviewed the CT of the chest performed on 10/2024 which demonstrates a 2.8 x 1.8 medial right lower lobe mass concerning for malignancy.  Borderline subcarinal and right hilar lymph nodes concerning for metastatic disease.  Multifocal osseous metastatic disease in the right humerus, thoracic lumbar and sacral spine, right ilium.     Assessment and Plan   Diagnoses and all orders for this visit:    1. Lung mass (Primary)  -     NM PET/CT Skull Base to Mid Thigh; Future  -     CT Chest Without Contrast; Future  -     MRI Brain With & Without Contrast; Future  -     Ambulatory Referral to Radiation Oncology  -     Case Request; Standing  -     Case Request    Other orders  -     Cancel: Follow Anesthesia Guidlines / Standing Orders; Standing  -     Follow Anesthesia Guidelines / Protocol; Future    Ms. Madrid is a pleasant 84-year-old lady with what appears to be metastatic lung cancer.  We had a long discussion today regarding need for staging with a PET CT scan as well as a brain MRI.  I would also recommend a bronchoscopy with biopsy of the right lower lobe mass as I think this will be the easiest way to obtain a diagnosis.  We will schedule her for a CT scan to facilitate this.       I spent 45 minutes caring for Madelyn on this date of service. This time includes time spent by me in the following activities:preparing for the visit,  reviewing tests, obtaining and/or reviewing a separately obtained history, performing a medically appropriate examination and/or evaluation , counseling and educating the patient/family/caregiver, ordering medications, tests, or procedures, documenting information in the medical record, and independently interpreting results and communicating that information with the patient/family/caregiver  Follow Up   No follow-ups on file.  Patient was given instructions and counseling regarding her condition or for health maintenance advice. Please see specific information pulled into the AVS if appropriate.

## 2024-10-18 ENCOUNTER — PATIENT ROUNDING (BHMG ONLY) (OUTPATIENT)
Dept: SURGERY | Facility: CLINIC | Age: 84
End: 2024-10-18
Payer: MEDICARE

## 2024-10-18 NOTE — PROGRESS NOTES
[facility] Radiation Oncology   Consult    Chief Complaint  Likely diffusely metastatic lung cancer      Diagnosis: Likely diffusely metastatic lung cancer    Overall Stage Likely diffusely metastatic      Pacemaker: no  Prior History of Radiation: yes - endometrial cancer 20+ years ago (2001?) at [facility] ([facility])  Contraindications to Radiation: no  Patient Requires Pregnancy Test: No, patient is female and >55 years and/or has undergone hysterectomy      HPI:    Madelyn Madrid is an 84 y.o. female with a history of endometrial cancer for which she describes an approximately 6-week course of external beam radiation therapy as well as brachytherapy.  She developed pain in her hip and right upper extremity over the past few months.  Eventually imaging studies demonstrated a pathologic fracture in the right upper extremity.  CT CAP has been performed which demonstrates an approximately 3 cm right lower lobe lung mass concerning for primary lung malignancy.  She also has multifocal osseous metastatic disease involving the right humerus, the T, L, S spine, right ilium.  She has a distant smoking history.  She is scheduled for PET/CT tomorrow and bronchoscopy with Dr. Iqbal later this week. She has been referred for consideration of palliative radiation therapy. She has not met with an orthopedic surgeon yet.       Imaging:      CT Chest Abdomen Pelvis With Contrast  10/2/2024  [facility]  Impression:  2.8 x 1.8 cm medial right lower lobe mass concerning for primary pulmonary malignancy. Borderline subcarinal and mildly enlarged right hilar lymph node suspicious for metastatic adenopathy  Indeterminate 5 mm right middle lobe nodule  Multifocal osseous metastatic disease involving the right humerus, the thoracic, lumbar, and sacral spine, and right ilium. The lesion involving the T9 vertebral body is immediately adjacent to the right lower lobe mass, however it is favored to be a metastatic lesion rather than direct  tumor invasion by the mass. There is pathologic superior endplate compression of T5 and mild wedge compression of T9  Indeterminate liver lesion, suspicious for metastasis. No other findings suspicious for intra abdominopelvic metastatic disease  Unrelated nonacute findings discussed in the body of the report      NM Bone Scan Whole Body  [facility]  Impression:  Findings compatible with multifocal skeletal metastatic disease involving the right humerus, and thoracic, lumbar, and sacral spine       Pathology:      No new relevant pathology      Labs:    Lab Results   Component Value Date    CREATININE 0.98 09/25/2024       CMP          9/25/2024    14:14   CMP   Glucose 105    BUN 11    Creatinine 0.98    EGFR 57.7    Sodium 141    Potassium 3.5    Chloride 101    Calcium 9.6    Total Protein 7.3    Total Protein 7.7    Albumin 4.1     3.3    Globulin 4.0    Globulin 3.6    Total Bilirubin 0.3    Alkaline Phosphatase 105    AST (SGOT) 18    ALT (SGPT) <5    Albumin/Globulin Ratio 1.1    BUN/Creatinine Ratio 11.2    Anion Gap 11.4      CBC          9/25/2024    14:14 10/9/2024    13:28   CBC   WBC 7.43  9.28    RBC 4.59  4.75    Hemoglobin 12.1  12.4    Hematocrit 39.1  39.7    MCV 85.2  83.6    MCH 26.4  26.1    MCHC 30.9  31.2    RDW 13.9  13.8    Platelets 385  361              Problem List:  Patient Active Problem List   Diagnosis    Lung mass          Medications:  Current Outpatient Medications on File Prior to Visit   Medication Sig Dispense Refill    acetaminophen (Tylenol) 325 MG tablet Take 2 tablets by mouth As Needed for Mild Pain.      amLODIPine (NORVASC) 10 MG tablet       Calcium Citrate-Vitamin D (Citrus Calcium/Vitamin D) 200-6.25 MG-MCG tablet Take  by mouth.      escitalopram (LEXAPRO) 20 MG tablet       meloxicam (MOBIC) 7.5 MG tablet Take 1 tablet by mouth Daily.      OMEPRAZOLE PO Take  by mouth.      traMADol (ULTRAM) 50 MG tablet Take 1 tablet by mouth Every 6 (Six) Hours As Needed  "for Moderate Pain. 120 tablet 0    vitamin B-12 (CYANOCOBALAMIN) 500 MCG tablet Take 1 tablet by mouth Daily.       Current Facility-Administered Medications on File Prior to Visit   Medication Dose Route Frequency Provider Last Rate Last Admin    [COMPLETED] gadoteridol (PROHANCE) injection 15 mL  15 mL Intravenous Once in imaging Deann Iqbal MD   10 mL at 10/21/24 1203          Allergies:  Allergies   Allergen Reactions    Other GI Intolerance     Opioids--         Family History:  The patient has no family history of conditions which would be contraindications to radiation therapy      Social History:  Former Smoker    Distance From Clinic: 30 minutes - 1 hour    Patient has someone who can assist with transportation: yes      Review of Systems:    Review of Systems   Constitutional:  Positive for appetite change and fatigue.   Gastrointestinal:  Positive for constipation.   Genitourinary:  Positive for dysuria.   Musculoskeletal:  Positive for arthralgias and back pain.         Vital Signs:  /76   Pulse 80   Temp 97.8 °F (36.6 °C) (Temporal)   Resp 18   Wt 49.9 kg (110 lb)   SpO2 97%   BMI 20.12 kg/m²   Estimated body mass index is 20.12 kg/m² as calculated from the following:    Height as of 10/17/24: 157.5 cm (62\").    Weight as of this encounter: 49.9 kg (110 lb).  Pain Score    10/21/24 1343   PainSc: 10-Worst pain ever   PainLoc: Arm  Comment: Right         ECOG: Capable of only limited selfcare; confined to bed or chair more than 50% of waking hours = 3    Physical Exam  Vitals reviewed.   Constitutional:       General: She is not in acute distress.     Appearance: Normal appearance.   HENT:      Head: Normocephalic and atraumatic.   Eyes:      Extraocular Movements: Extraocular movements intact.      Pupils: Pupils are equal, round, and reactive to light.   Pulmonary:      Effort: Pulmonary effort is normal.   Abdominal:      General: Abdomen is flat.      Palpations: Abdomen is soft. "   Musculoskeletal:      Cervical back: Normal range of motion.      Comments: Right arm in sling due to humeral fracture. Left hip pain.    Skin:     General: Skin is warm and dry.   Neurological:      General: No focal deficit present.      Mental Status: She is alert and oriented to person, place, and time.   Psychiatric:         Mood and Affect: Mood normal.         Behavior: Behavior normal.          Result Review :  The following data was reviewed by: Zia Manzanares MD on 10/21/2024:  Labs: Last Creatinine, CBC, CMP  Data reviewed : Radiologic studies CT CAP, NM Bone Scan             Diagnoses and all orders for this visit:    1. Lung mass (Primary)        Assessment:    Madelyn Madrid is an 84 y.o. female with a history of endometrial cancer for which she describes an approximately 6-week course of external beam radiation therapy as well as brachytherapy.  She developed pain in her hip and right upper extremity over the past few months.  Eventually imaging studies demonstrated a pathologic fracture in the right upper extremity.  CT CAP has been performed which demonstrates an approximately 3 cm right lower lobe lung mass concerning for primary lung malignancy.  She also has multifocal osseous metastatic disease involving the right humerus, the T, L, S spine, right ilium.  She has a distant smoking history.  She is scheduled for PET/CT tomorrow and bronchoscopy with Dr. Iqbal later this week. She has been referred for consideration of palliative radiation therapy. She has not met with an orthopedic surgeon yet.     I met with the patient and discussed her workup to date in detail.  I discussed the risk, benefits, side effects, logistics of palliative radiation therapy to her right humerus.  She is also having pain in her left lower back.  I would like her case to be discussed with an orthopedic surgeon and I have reached out to Dr. Story with [facility] prior to her initiating radiation therapy to her right  humerus.  We did go ahead and perform CT simulation after obtaining consent so that we can initiate her palliative radiation therapy soon as possible.      Plan:    -Plan on radiation therapy to palliatively treat her right humerus as well as left lower back.  Consent signed.  CT simulation performed.  - Prior to initiating radiation therapy to her pathologically fractured right humerus we will have her case discussed with orthopedic surgery.  I reached out to Dr. Story at [facility] to discuss.        I spent 60 minutes caring for Madelyn on this date of service. This time includes time spent by me in the following activities:preparing for the visit, reviewing tests, obtaining and/or reviewing a separately obtained history, documenting information in the medical record, independently interpreting results and communicating that information with the patient/family/caregiver, and care coordination  Follow Up   No follow-ups on file.  Patient was given instructions and counseling regarding her condition or for health maintenance advice. Please see specific information pulled into the AVS if appropriate.     Zia Manzanares MD

## 2024-10-18 NOTE — PROGRESS NOTES
October 18, 2024    Hello, may I speak with Madelyn Madrid?    My name is BELLA      I am  with [facility]  [address] [place] [address] [phone].    Before we get started may I verify your date of birth? 1940    I am calling to officially welcome you to our practice and ask about your recent visit. Is this a good time to talk? no    Tell me about your visit with us. What things went well?  LEFT MESSAGE       We're always looking for ways to make our patients' experiences even better. Do you have recommendations on ways we may improve?  no    Overall were you satisfied with your first visit to our practice? yes       I appreciate you taking the time to speak with me today. Is there anything else I can do for you? no      Thank you, and have a great day.

## 2024-10-21 ENCOUNTER — HOSPITAL ENCOUNTER (OUTPATIENT)
Dept: MRI IMAGING | Facility: HOSPITAL | Age: 84
Discharge: HOME | End: 2024-10-21
Payer: MEDICARE

## 2024-10-21 ENCOUNTER — HOSPITAL ENCOUNTER (OUTPATIENT)
Dept: RADIATION ONCOLOGY | Facility: HOSPITAL | Age: 84
Discharge: HOME | End: 2024-10-21
Payer: MEDICARE

## 2024-10-21 ENCOUNTER — CONSULT (OUTPATIENT)
Dept: RADIATION ONCOLOGY | Facility: HOSPITAL | Age: 84
End: 2024-10-21
Payer: MEDICARE

## 2024-10-21 ENCOUNTER — HOSPITAL ENCOUNTER (OUTPATIENT)
Dept: RADIATION ONCOLOGY | Facility: HOSPITAL | Age: 84
Setting detail: RADIATION/ONCOLOGY SERIES
End: 2024-10-21
Payer: MEDICARE

## 2024-10-21 VITALS
DIASTOLIC BLOOD PRESSURE: 76 MMHG | TEMPERATURE: 97.8 F | RESPIRATION RATE: 18 BRPM | HEART RATE: 80 BPM | OXYGEN SATURATION: 97 % | WEIGHT: 110 LBS | BODY MASS INDEX: 20.12 KG/M2 | SYSTOLIC BLOOD PRESSURE: 118 MMHG

## 2024-10-21 DIAGNOSIS — C79.51 MALIGNANT NEOPLASM METASTATIC TO PELVIC BONE WITH UNKNOWN PRIMARY SITE: ICD-10-CM

## 2024-10-21 DIAGNOSIS — C80.1 MALIGNANT NEOPLASM METASTATIC TO PELVIC BONE WITH UNKNOWN PRIMARY SITE: ICD-10-CM

## 2024-10-21 DIAGNOSIS — C79.51: Primary | ICD-10-CM

## 2024-10-21 DIAGNOSIS — C80.1: Primary | ICD-10-CM

## 2024-10-21 DIAGNOSIS — R91.8 LUNG MASS: ICD-10-CM

## 2024-10-21 PROCEDURE — A9579 GAD-BASE MR CONTRAST NOS,1ML: HCPCS | Performed by: THORACIC SURGERY (CARDIOTHORACIC VASCULAR SURGERY)

## 2024-10-21 PROCEDURE — 77334 RADIATION TREATMENT AID(S): CPT | Performed by: STUDENT IN AN ORGANIZED HEALTH CARE EDUCATION/TRAINING PROGRAM

## 2024-10-21 PROCEDURE — 25010000002 GADOTERIDOL PER 1 ML: Performed by: THORACIC SURGERY (CARDIOTHORACIC VASCULAR SURGERY)

## 2024-10-21 PROCEDURE — 77290 THER RAD SIMULAJ FIELD CPLX: CPT | Performed by: STUDENT IN AN ORGANIZED HEALTH CARE EDUCATION/TRAINING PROGRAM

## 2024-10-21 PROCEDURE — 77263 THER RADIOLOGY TX PLNG CPLX: CPT | Performed by: STUDENT IN AN ORGANIZED HEALTH CARE EDUCATION/TRAINING PROGRAM

## 2024-10-21 PROCEDURE — G0463 HOSPITAL OUTPT CLINIC VISIT: HCPCS | Performed by: STUDENT IN AN ORGANIZED HEALTH CARE EDUCATION/TRAINING PROGRAM

## 2024-10-21 PROCEDURE — 70553 MRI BRAIN STEM W/O & W/DYE: CPT

## 2024-10-21 RX ADMIN — GADOTERIDOL 10 ML: 279.3 INJECTION, SOLUTION INTRAVENOUS at 12:03

## 2024-10-22 ENCOUNTER — TELEPHONE (OUTPATIENT)
Dept: SURGERY | Facility: CLINIC | Age: 84
End: 2024-10-22
Payer: MEDICARE

## 2024-10-22 ENCOUNTER — HOSPITAL ENCOUNTER (OUTPATIENT)
Dept: PET IMAGING | Facility: HOSPITAL | Age: 84
Discharge: HOME | End: 2024-10-22
Payer: MEDICARE

## 2024-10-22 DIAGNOSIS — R91.8 LUNG MASS: ICD-10-CM

## 2024-10-22 LAB — GLUCOSE BLDC GLUCOMTR-MCNC: 113 MG/DL (ref 70–105)

## 2024-10-22 PROCEDURE — A9552 F18 FDG: HCPCS | Performed by: THORACIC SURGERY (CARDIOTHORACIC VASCULAR SURGERY)

## 2024-10-22 PROCEDURE — 82948 REAGENT STRIP/BLOOD GLUCOSE: CPT

## 2024-10-22 PROCEDURE — 78815 PET IMAGE W/CT SKULL-THIGH: CPT

## 2024-10-22 PROCEDURE — 0 FLUDEOXYGLUCOSE F18 SOLUTION: Performed by: THORACIC SURGERY (CARDIOTHORACIC VASCULAR SURGERY)

## 2024-10-22 PROCEDURE — 71250 CT THORAX DX C-: CPT

## 2024-10-22 RX ADMIN — FLUDEOXYGLUCOSE F 18 1 DOSE: 200 INJECTION, SOLUTION INTRAVENOUS at 12:18

## 2024-10-22 NOTE — TELEPHONE ENCOUNTER
I CALLED THE PATIENT AND MOVED THE TIME OF ARRIVAL TO 6:30 AM FOR HER ION BRONCH ON 10/23/24.  THEY ARE IN AGREEMENT WITH THE NEW ARRIVAL TIME.

## 2024-10-23 ENCOUNTER — ANESTHESIA (OUTPATIENT)
Dept: GASTROENTEROLOGY | Facility: HOSPITAL | Age: 84
End: 2024-10-23
Payer: MEDICARE

## 2024-10-23 ENCOUNTER — ANESTHESIA EVENT (OUTPATIENT)
Dept: GASTROENTEROLOGY | Facility: HOSPITAL | Age: 84
End: 2024-10-23
Payer: MEDICARE

## 2024-10-23 ENCOUNTER — HOSPITAL ENCOUNTER (OUTPATIENT)
Facility: HOSPITAL | Age: 84
Setting detail: HOSPITAL OUTPATIENT SURGERY
Discharge: HOME | End: 2024-10-23
Attending: THORACIC SURGERY (CARDIOTHORACIC VASCULAR SURGERY) | Admitting: THORACIC SURGERY (CARDIOTHORACIC VASCULAR SURGERY)
Payer: MEDICARE

## 2024-10-23 ENCOUNTER — APPOINTMENT (OUTPATIENT)
Dept: GENERAL RADIOLOGY | Facility: HOSPITAL | Age: 84
End: 2024-10-23
Payer: MEDICARE

## 2024-10-23 VITALS
WEIGHT: 110.23 LBS | RESPIRATION RATE: 18 BRPM | DIASTOLIC BLOOD PRESSURE: 55 MMHG | SYSTOLIC BLOOD PRESSURE: 121 MMHG | HEART RATE: 93 BPM | BODY MASS INDEX: 20.28 KG/M2 | TEMPERATURE: 98.7 F | OXYGEN SATURATION: 94 % | HEIGHT: 62 IN

## 2024-10-23 DIAGNOSIS — C80.1: Primary | ICD-10-CM

## 2024-10-23 DIAGNOSIS — C79.51: Primary | ICD-10-CM

## 2024-10-23 DIAGNOSIS — R91.8 LUNG MASS: ICD-10-CM

## 2024-10-23 LAB
QT INTERVAL: 381 MS
QTC INTERVAL: 465 MS

## 2024-10-23 PROCEDURE — 25010000002 DEXAMETHASONE PER 1 MG: Performed by: NURSE ANESTHETIST, CERTIFIED REGISTERED

## 2024-10-23 PROCEDURE — 25810000003 SODIUM CHLORIDE 0.9 % SOLUTION: Performed by: NURSE ANESTHETIST, CERTIFIED REGISTERED

## 2024-10-23 PROCEDURE — 25810000003 SODIUM CHLORIDE 0.9 % SOLUTION 250 ML FLEX CONT: Performed by: NURSE ANESTHETIST, CERTIFIED REGISTERED

## 2024-10-23 PROCEDURE — S0260 H&P FOR SURGERY: HCPCS | Performed by: THORACIC SURGERY (CARDIOTHORACIC VASCULAR SURGERY)

## 2024-10-23 PROCEDURE — 25010000002 PHENYLEPHRINE 10 MG/ML SOLUTION 5 ML VIAL: Performed by: NURSE ANESTHETIST, CERTIFIED REGISTERED

## 2024-10-23 PROCEDURE — 93005 ELECTROCARDIOGRAM TRACING: CPT | Performed by: THORACIC SURGERY (CARDIOTHORACIC VASCULAR SURGERY)

## 2024-10-23 PROCEDURE — 88177 CYTP FNA EVAL EA ADDL: CPT | Performed by: THORACIC SURGERY (CARDIOTHORACIC VASCULAR SURGERY)

## 2024-10-23 PROCEDURE — 88305 TISSUE EXAM BY PATHOLOGIST: CPT | Performed by: THORACIC SURGERY (CARDIOTHORACIC VASCULAR SURGERY)

## 2024-10-23 PROCEDURE — 25010000002 ONDANSETRON PER 1 MG: Performed by: NURSE ANESTHETIST, CERTIFIED REGISTERED

## 2024-10-23 PROCEDURE — 31627 NAVIGATIONAL BRONCHOSCOPY: CPT | Performed by: THORACIC SURGERY (CARDIOTHORACIC VASCULAR SURGERY)

## 2024-10-23 PROCEDURE — 94799 UNLISTED PULMONARY SVC/PX: CPT

## 2024-10-23 PROCEDURE — 88108 CYTOPATH CONCENTRATE TECH: CPT | Performed by: THORACIC SURGERY (CARDIOTHORACIC VASCULAR SURGERY)

## 2024-10-23 PROCEDURE — 88333 PATH CONSLTJ SURG CYTO XM 1: CPT | Performed by: THORACIC SURGERY (CARDIOTHORACIC VASCULAR SURGERY)

## 2024-10-23 PROCEDURE — 25010000002 SUGAMMADEX 200 MG/2ML SOLUTION: Performed by: NURSE ANESTHETIST, CERTIFIED REGISTERED

## 2024-10-23 PROCEDURE — 31624 DX BRONCHOSCOPE/LAVAGE: CPT | Performed by: THORACIC SURGERY (CARDIOTHORACIC VASCULAR SURGERY)

## 2024-10-23 PROCEDURE — 71045 X-RAY EXAM CHEST 1 VIEW: CPT

## 2024-10-23 PROCEDURE — 94761 N-INVAS EAR/PLS OXIMETRY MLT: CPT

## 2024-10-23 PROCEDURE — 88172 CYTP DX EVAL FNA 1ST EA SITE: CPT | Performed by: THORACIC SURGERY (CARDIOTHORACIC VASCULAR SURGERY)

## 2024-10-23 PROCEDURE — 76000 FLUOROSCOPY <1 HR PHYS/QHP: CPT

## 2024-10-23 PROCEDURE — 93010 ELECTROCARDIOGRAM REPORT: CPT | Performed by: INTERNAL MEDICINE

## 2024-10-23 PROCEDURE — 31629 BRONCHOSCOPY/NEEDLE BX EACH: CPT | Performed by: THORACIC SURGERY (CARDIOTHORACIC VASCULAR SURGERY)

## 2024-10-23 PROCEDURE — 25010000002 PROPOFOL 200 MG/20ML EMULSION: Performed by: NURSE ANESTHETIST, CERTIFIED REGISTERED

## 2024-10-23 PROCEDURE — 94640 AIRWAY INHALATION TREATMENT: CPT

## 2024-10-23 PROCEDURE — 25010000002 LIDOCAINE PF 1% 1 % SOLUTION: Performed by: NURSE ANESTHETIST, CERTIFIED REGISTERED

## 2024-10-23 PROCEDURE — 25010000002 SUCCINYLCHOLINE PER 20 MG: Performed by: NURSE ANESTHETIST, CERTIFIED REGISTERED

## 2024-10-23 PROCEDURE — 31641 BRONCHOSCOPY TREAT BLOCKAGE: CPT | Performed by: THORACIC SURGERY (CARDIOTHORACIC VASCULAR SURGERY)

## 2024-10-23 PROCEDURE — 88173 CYTOPATH EVAL FNA REPORT: CPT | Performed by: THORACIC SURGERY (CARDIOTHORACIC VASCULAR SURGERY)

## 2024-10-23 RX ORDER — IPRATROPIUM BROMIDE AND ALBUTEROL SULFATE 2.5; .5 MG/3ML; MG/3ML
3 SOLUTION RESPIRATORY (INHALATION) ONCE
Status: DISCONTINUED | OUTPATIENT
Start: 2024-10-23 | End: 2024-10-23 | Stop reason: SDUPTHER

## 2024-10-23 RX ORDER — LIDOCAINE HYDROCHLORIDE 10 MG/ML
INJECTION, SOLUTION EPIDURAL; INFILTRATION; INTRACAUDAL; PERINEURAL AS NEEDED
Status: DISCONTINUED | OUTPATIENT
Start: 2024-10-23 | End: 2024-10-23 | Stop reason: SURG

## 2024-10-23 RX ORDER — LIDOCAINE 50 MG/G
OINTMENT TOPICAL AS NEEDED
Status: DISCONTINUED | OUTPATIENT
Start: 2024-10-23 | End: 2024-10-23 | Stop reason: HOSPADM

## 2024-10-23 RX ORDER — HYDRALAZINE HYDROCHLORIDE 20 MG/ML
5 INJECTION INTRAMUSCULAR; INTRAVENOUS
Status: DISCONTINUED | OUTPATIENT
Start: 2024-10-23 | End: 2024-10-23 | Stop reason: HOSPADM

## 2024-10-23 RX ORDER — DEXAMETHASONE SODIUM PHOSPHATE 4 MG/ML
INJECTION, SOLUTION INTRA-ARTICULAR; INTRALESIONAL; INTRAMUSCULAR; INTRAVENOUS; SOFT TISSUE AS NEEDED
Status: DISCONTINUED | OUTPATIENT
Start: 2024-10-23 | End: 2024-10-23 | Stop reason: SURG

## 2024-10-23 RX ORDER — LABETALOL HYDROCHLORIDE 5 MG/ML
5 INJECTION, SOLUTION INTRAVENOUS
Status: DISCONTINUED | OUTPATIENT
Start: 2024-10-23 | End: 2024-10-23 | Stop reason: HOSPADM

## 2024-10-23 RX ORDER — ONDANSETRON 2 MG/ML
4 INJECTION INTRAMUSCULAR; INTRAVENOUS ONCE AS NEEDED
Status: DISCONTINUED | OUTPATIENT
Start: 2024-10-23 | End: 2024-10-23 | Stop reason: HOSPADM

## 2024-10-23 RX ORDER — OXYCODONE HYDROCHLORIDE 5 MG/1
10 TABLET ORAL EVERY 4 HOURS PRN
Status: DISCONTINUED | OUTPATIENT
Start: 2024-10-23 | End: 2024-10-23

## 2024-10-23 RX ORDER — EPHEDRINE SULFATE 5 MG/ML
5 INJECTION INTRAVENOUS ONCE AS NEEDED
Status: DISCONTINUED | OUTPATIENT
Start: 2024-10-23 | End: 2024-10-23 | Stop reason: HOSPADM

## 2024-10-23 RX ORDER — DIPHENHYDRAMINE HYDROCHLORIDE 50 MG/ML
12.5 INJECTION INTRAMUSCULAR; INTRAVENOUS ONCE AS NEEDED
Status: DISCONTINUED | OUTPATIENT
Start: 2024-10-23 | End: 2024-10-23 | Stop reason: HOSPADM

## 2024-10-23 RX ORDER — IPRATROPIUM BROMIDE AND ALBUTEROL SULFATE 2.5; .5 MG/3ML; MG/3ML
3 SOLUTION RESPIRATORY (INHALATION) ONCE AS NEEDED
Status: DISCONTINUED | OUTPATIENT
Start: 2024-10-23 | End: 2024-10-23 | Stop reason: HOSPADM

## 2024-10-23 RX ORDER — DIPHENHYDRAMINE HYDROCHLORIDE 50 MG/ML
12.5 INJECTION INTRAMUSCULAR; INTRAVENOUS
Status: DISCONTINUED | OUTPATIENT
Start: 2024-10-23 | End: 2024-10-23 | Stop reason: HOSPADM

## 2024-10-23 RX ORDER — ONDANSETRON 2 MG/ML
INJECTION INTRAMUSCULAR; INTRAVENOUS AS NEEDED
Status: DISCONTINUED | OUTPATIENT
Start: 2024-10-23 | End: 2024-10-23 | Stop reason: SURG

## 2024-10-23 RX ORDER — SUCCINYLCHOLINE CHLORIDE 20 MG/ML
INJECTION INTRAMUSCULAR; INTRAVENOUS AS NEEDED
Status: DISCONTINUED | OUTPATIENT
Start: 2024-10-23 | End: 2024-10-23 | Stop reason: SURG

## 2024-10-23 RX ORDER — OXYCODONE HYDROCHLORIDE 5 MG/1
5 TABLET ORAL ONCE AS NEEDED
Status: DISCONTINUED | OUTPATIENT
Start: 2024-10-23 | End: 2024-10-23

## 2024-10-23 RX ORDER — IPRATROPIUM BROMIDE AND ALBUTEROL SULFATE 2.5; .5 MG/3ML; MG/3ML
3 SOLUTION RESPIRATORY (INHALATION) ONCE
Status: COMPLETED | OUTPATIENT
Start: 2024-10-23 | End: 2024-10-23

## 2024-10-23 RX ORDER — NALOXONE HCL 0.4 MG/ML
0.4 VIAL (ML) INJECTION AS NEEDED
Status: DISCONTINUED | OUTPATIENT
Start: 2024-10-23 | End: 2024-10-23 | Stop reason: HOSPADM

## 2024-10-23 RX ORDER — SODIUM CHLORIDE 9 MG/ML
INJECTION, SOLUTION INTRAVENOUS CONTINUOUS PRN
Status: DISCONTINUED | OUTPATIENT
Start: 2024-10-23 | End: 2024-10-23 | Stop reason: SURG

## 2024-10-23 RX ORDER — PROPOFOL 10 MG/ML
INJECTION, EMULSION INTRAVENOUS AS NEEDED
Status: DISCONTINUED | OUTPATIENT
Start: 2024-10-23 | End: 2024-10-23 | Stop reason: SURG

## 2024-10-23 RX ORDER — ROCURONIUM BROMIDE 10 MG/ML
INJECTION, SOLUTION INTRAVENOUS AS NEEDED
Status: DISCONTINUED | OUTPATIENT
Start: 2024-10-23 | End: 2024-10-23 | Stop reason: SURG

## 2024-10-23 RX ADMIN — ONDANSETRON 4 MG: 2 INJECTION, SOLUTION INTRAMUSCULAR; INTRAVENOUS at 08:00

## 2024-10-23 RX ADMIN — LIDOCAINE HYDROCHLORIDE 50 MG: 10 INJECTION, SOLUTION EPIDURAL; INFILTRATION; INTRACAUDAL; PERINEURAL at 07:55

## 2024-10-23 RX ADMIN — ROCURONIUM BROMIDE 40 MG: 10 INJECTION, SOLUTION INTRAVENOUS at 08:05

## 2024-10-23 RX ADMIN — SODIUM CHLORIDE: 9 INJECTION, SOLUTION INTRAVENOUS at 07:50

## 2024-10-23 RX ADMIN — PHENYLEPHRINE HYDROCHLORIDE 0.5 MCG/KG/MIN: 10 INJECTION INTRAVENOUS at 08:00

## 2024-10-23 RX ADMIN — SUGAMMADEX 200 MG: 100 INJECTION, SOLUTION INTRAVENOUS at 08:42

## 2024-10-23 RX ADMIN — ROCURONIUM BROMIDE 10 MG: 10 INJECTION, SOLUTION INTRAVENOUS at 07:55

## 2024-10-23 RX ADMIN — IPRATROPIUM BROMIDE AND ALBUTEROL SULFATE 3 ML: .5; 3 SOLUTION RESPIRATORY (INHALATION) at 07:38

## 2024-10-23 RX ADMIN — SUCCINYLCHOLINE CHLORIDE 100 MG: 20 INJECTION, SOLUTION INTRAMUSCULAR; INTRAVENOUS at 07:55

## 2024-10-23 RX ADMIN — ROCURONIUM BROMIDE 10 MG: 10 INJECTION, SOLUTION INTRAVENOUS at 08:30

## 2024-10-23 RX ADMIN — DEXAMETHASONE SODIUM PHOSPHATE 8 MG: 4 INJECTION, SOLUTION INTRAMUSCULAR; INTRAVENOUS at 08:00

## 2024-10-23 RX ADMIN — PROPOFOL 100 MG: 10 INJECTION, EMULSION INTRAVENOUS at 07:55

## 2024-10-23 NOTE — ANESTHESIA PREPROCEDURE EVALUATION
Anesthesia Evaluation     Patient summary reviewed and Nursing notes reviewed   NPO Solid Status: > 8 hours  NPO Liquid Status: > 8 hours           Airway   Mallampati: II  TM distance: >3 FB  Neck ROM: full  No difficulty expected  Dental - normal exam     Pulmonary    (+) COPD,  Cardiovascular     (+) hypertension      Neuro/Psych  GI/Hepatic/Renal/Endo    (+) GERD, thyroid problem     Musculoskeletal     Abdominal    Substance History      OB/GYN          Other   arthritis,   history of cancer    ROS/Med Hx Other: Structurally and functionally normal cardiac valves.  Mild pulmonary hypertension is present.( 35 mmHg).  Left ventricle is normal in size and contractility with ejection fraction of 65%.  Left ventricular Global longitudinal LV strain (GLS) = 16.8%.   Concentric left ventricular hypertrophy is present.  Grade 1 LV dysfunction is present.  Right ventricle is normal in size and contractility with TAPSE of 2.5 cm..                Anesthesia Plan    ASA 3     general     intravenous induction     Anesthetic plan, risks, benefits, and alternatives have been provided, discussed and informed consent has been obtained with: patient.    Plan discussed with CRNA.    CODE STATUS:

## 2024-10-23 NOTE — OP NOTE
BRONCHOSCOPY WITH ION ROBOT  Procedure Report    Patient Name:  Madelyn Madrid  YOB: 1940    Date of Surgery:  10/23/2024     Indications:  Lung nodule with osseus metastasis    Pre-op Diagnosis:   Lung mass [R91.8]       Post-Op Diagnosis Codes:     * Lung mass [R91.8]    Procedure/CPT® Codes:      Procedure(s):  ROBOTIC BRONCHOSCOPY WITH FINE NEEDLE ASPIRATION AND CRYO BIOPSY, BRONCHOSCOPY WITH BRONCHOALVEOLAR LAVAGE    Staff:  Surgeon(s):  Deann Iqbal MD    Anesthesia: General    Estimated Blood Loss: minimal    Implants:    Nothing was implanted during the procedure    Specimen:          Specimens       ID Source Type Tests Collected By Collected At Frozen?    A Lung, Right Lower Lobe Fine Needle Aspirate FINE NEEDLE ASPIRATION   Deann Iqbal MD 10/23/24 0812     Description: RLL FNA-DRY SLIDES    This specimen was not marked as sent.    B Lung, Right Lower Lobe Fine Needle Aspirate FINE NEEDLE ASPIRATION   Deann Iqbal MD 10/23/24 0812     Description: RLL FNA-SLIDES IN ALCOHOL    This specimen was not marked as sent.    C Lung, Right Lower Lobe Fine Needle Aspirate FINE NEEDLE ASPIRATION   Deann Iqbal MD 10/23/24 0812     Description: RLL FNA-CELL BLOCK    This specimen was not marked as sent.    D Lung, Right Lower Lobe Tissue TISSUE PATHOLOGY EXAM   Deann Iqbal MD 10/23/24 0812     Description: RLL TOUCH PREP BIOPSY-DRY SLIDES    This specimen was not marked as sent.    E Lung, Right Lower Lobe Tissue TISSUE PATHOLOGY EXAM   Deann Iqbal MD 10/23/24 0813     Description: RLL TOUCH PREP BIOPSY-CELL BLOCK    This specimen was not marked as sent.    F Lung, Right Lower Lobe Lavage NON-GYNECOLOGIC CYTOLOGY   Deann Iqbal MD 10/23/24 0837     This specimen was not marked as sent.              Findings: LARA with NSCLC    Complications: None    Description of Procedure:  Madelyn Madrid was identified in the preoperative holding area and again her consent for the  procedure was verified.  Prior to bringing the patient to the endoscopy suite, planning was performed to allow navigation to the right lower lobe mass.  She was transferred to the endoscopy suite placed on the endoscopy table in supine position.  A general anesthetic was successfully administered and She was intubated without difficulty.  A timeout was performed.    The Olympus endoscope was introduced in the patient's airway and examination was conducted to the secondary ute.  All the secretions were evacuated to facilitate robotic navigation.  The Ion robotic navigation system was docked to the patient in standard fashion.  The airway was registered.  We were able to navigate to the lesion.  Radial endobronchial ultrasound was used to confirm that we were in the lesion we had good concentric signal.  Multiple biopsies of the mass were performed using a 21-gauge biopsy needle as well as cryobiopsy in a concentric location around the entire mass.  Fluoroscopy was used while passing instruments and tools.  Rapid onsite evaluation confirmed malignancy.  A bronchoalveolar lavage was performed.    The scope was withdrawn and replaced with the Olympus video endoscope.  A small amount of blood was evacuated from the airway.  There was no evidence of significant bleeding.  The patient tolerated this procedure well, was extubated and transferred to recovery room in stable condition.      Deann Iqbal MD     Date: 10/23/2024  Time: 08:50 EDT

## 2024-10-23 NOTE — H&P
Psychiatric   PREOPERATIVE HISTORY AND PHYSICAL    Patient Name:Madelyn Madrid  : 1940  MRN: [id number]  Primary Care Physician: Therese Martinez MD  Date of admission: 10/23/2024    Subjective   Subjective     Chief Complaint: preoperative evaluation    History of Present Illness  Madelyn Madrid is a 84 y.o. female who presents for preoperative evaluation. She is scheduled for BRONCHOSCOPY NAVIGATION WITH ENDOBRONCHIAL ULTRASOUND AND ION ROBOT (N/A)    Review of Systems     Personal History     Past Medical History:   Diagnosis Date    Arthritis     COPD (chronic obstructive pulmonary disease) 10/2/24    CT scan    Depression     Emphysema of lung     CT scan    Endometrial cancer     GERD (gastroesophageal reflux disease)     Goiter     Hypertension     Pancreatitis        Past Surgical History:   Procedure Laterality Date    HYSTERECTOMY      LAPAROSCOPY HYSTEROSCOPY ENDOMETRIAL ABLATION         Family History: Her family history includes Alzheimer's disease in her brother, mother, and sister; Cancer in her father and mother; Epilepsy in her brother; Rectal cancer in her mother.     Social History: She  reports that she quit smoking about 25 years ago. Her smoking use included cigarettes. She started smoking about 65 years ago. She has a 40 pack-year smoking history. She has been exposed to tobacco smoke. She has never used smokeless tobacco. She reports that she does not currently use alcohol. She reports that she does not use drugs.    Home Medications:  Citrus Calcium/Vitamin D, Omeprazole, acetaminophen, amLODIPine, escitalopram, meloxicam, traMADol, and vitamin B-12    Allergies:  She is allergic to other.    Objective    Objective     Vitals:    Temp:  [98.7 °F (37.1 °C)] 98.7 °F (37.1 °C)  Heart Rate:  [88-93] 93  Resp:  [12-19] 12  BP: (166)/(76) 166/76    Physical Exam    Assessment & Plan   Assessment / Plan     Brief Patient Summary:  Madelyn Madrid is a 84 y.o. female who  presents for preoperative evaluation.    Pre-Op Diagnosis Codes:      * Lung mass [R91.8]    Active Hospital Problems:  Active Hospital Problems    Diagnosis     **Lung mass      Plan:   Procedure(s):  BRONCHOSCOPY NAVIGATION WITH ENDOBRONCHIAL ULTRASOUND AND ION ROBOT    The risks, benefits, and alternatives of the procedure including but not limited to pneumothorax, need for repeat procedure and risks of the anesthesia were discussed in detail with the patient and questions were answered. No guarantees were made or implied. Informed consent was obtained.    Deann Iqbal MD

## 2024-10-23 NOTE — DISCHARGE INSTRUCTIONS
-Do not drink alcohol, drive, operate any heavy machinery or power tools, or make any important/legal decisions for the next 24 hours.    Call you doctor immediately if you experience severe chest pain, shortness of breath, bleeding or coughing up blood, or fever over 101 F.    Diet: Nothing by mouth until  1100 am.    After a bronchoscopy, you may experience a scratchy throat. This will gradually get better. You may gargle with warm salt water for this after the time noted above is over.     A responsible adult should stay with you and you should rest quietly for the rest of the day. Follow up with MD as instructed.  Dr. Iqbal -[phone]

## 2024-10-23 NOTE — ANESTHESIA PROCEDURE NOTES
Airway  Urgency: elective    Date/Time: 10/23/2024 7:56 AM  Airway not difficult    General Information and Staff    Patient location during procedure: OR    Indications and Patient Condition  Indications for airway management: airway protection    Preoxygenated: yes  MILS maintained throughout  Mask difficulty assessment: 1 - vent by mask    Final Airway Details  Final airway type: endotracheal airway      Successful airway: ETT  Cuffed: yes   Successful intubation technique: video laryngoscopy  Facilitating devices/methods: intubating stylet  Endotracheal tube insertion site: oral  Blade: Lorenz  ETT size (mm): 8.5  Cormack-Lehane Classification: grade I - full view of glottis  Placement verified by: chest auscultation and capnometry   Cuff volume (mL): 7  Number of attempts at approach: 1  Assessment: lips, teeth, and gum same as pre-op and atraumatic intubation

## 2024-10-23 NOTE — ANESTHESIA POSTPROCEDURE EVALUATION
Patient: Madelyn Madrid    Procedure Summary       Date: 10/23/24 Room / Location: [facility] ENDOSCOPY 3 / [facility] ENDOSCOPY    Anesthesia Start: 0750 Anesthesia Stop: 0846    Procedure: ROBOTIC BRONCHOSCOPY WITH FINE NEEDLE ASPIRATION AND CRYO BIOPSY, BRONCHOSCOPY WITH BRONCHOALVEOLAR LAVAGE (Bronchus) Diagnosis:       Lung mass      (Lung mass [R91.8])    Surgeons: Deann Iqbal MD Provider: Carroll Yost MD    Anesthesia Type: general ASA Status: 3            Anesthesia Type: general    Vitals  Vitals Value Taken Time   /55 10/23/24 0941   Temp     Pulse 94 10/23/24 0942   Resp 18 10/23/24 0940   SpO2 88 % 10/23/24 0942   Vitals shown include unfiled device data.        Post Anesthesia Care and Evaluation    Patient location during evaluation: PACU  Patient participation: complete - patient participated  Level of consciousness: awake  Pain scale: See nurse's notes for pain score.  Pain management: adequate    Airway patency: patent  Anesthetic complications: No anesthetic complications  PONV Status: none  Cardiovascular status: acceptable  Respiratory status: acceptable and spontaneous ventilation  Hydration status: acceptable    Comments: Patient seen and examined postoperatively; vital signs stable; SpO2 greater than or equal to 90%; cardiopulmonary status stable; nausea/vomiting adequately controlled; pain adequately controlled; no apparent anesthesia complications; patient discharged from anesthesia care when discharge criteria were met

## 2024-10-24 ENCOUNTER — HOSPITAL ENCOUNTER (OUTPATIENT)
Dept: RADIATION ONCOLOGY | Facility: HOSPITAL | Age: 84
Discharge: HOME | End: 2024-10-24

## 2024-10-24 LAB
BEAKER LAB AP INTRAOPERATIVE CONSULTATION: NORMAL
CYTO UR: NORMAL
LAB AP CASE REPORT: NORMAL
LAB AP DIAGNOSIS COMMENT: NORMAL
LAB AP DIAGNOSIS COMMENT: NORMAL
Lab: NORMAL
PATH REPORT.FINAL DX SPEC: NORMAL
PATH REPORT.GROSS SPEC: NORMAL
RAD ONC ARIA COURSE ID: NORMAL
RAD ONC ARIA COURSE LAST TREATMENT DATE: NORMAL
RAD ONC ARIA COURSE START DATE: NORMAL
RAD ONC ARIA COURSE TREATMENT ELAPSED DAYS: 0
RAD ONC ARIA FIRST TREATMENT DATE: NORMAL
RAD ONC ARIA PLAN FRACTIONS TREATED TO DATE: 1
RAD ONC ARIA PLAN ID: NORMAL
RAD ONC ARIA PLAN PRESCRIBED DOSE PER FRACTION: 3 GY
RAD ONC ARIA PLAN PRIMARY REFERENCE POINT: NORMAL
RAD ONC ARIA PLAN TOTAL FRACTIONS PRESCRIBED: 10
RAD ONC ARIA PLAN TOTAL PRESCRIBED DOSE: 3000 CGY
RAD ONC ARIA REFERENCE POINT DOSAGE GIVEN TO DATE: 3 GY
RAD ONC ARIA REFERENCE POINT ID: NORMAL
RAD ONC ARIA REFERENCE POINT SESSION DOSAGE GIVEN: 3 GY

## 2024-10-24 PROCEDURE — 77300 RADIATION THERAPY DOSE PLAN: CPT | Performed by: STUDENT IN AN ORGANIZED HEALTH CARE EDUCATION/TRAINING PROGRAM

## 2024-10-24 PROCEDURE — 77280 THER RAD SIMULAJ FIELD SMPL: CPT | Performed by: STUDENT IN AN ORGANIZED HEALTH CARE EDUCATION/TRAINING PROGRAM

## 2024-10-24 PROCEDURE — 77334 RADIATION TREATMENT AID(S): CPT | Performed by: STUDENT IN AN ORGANIZED HEALTH CARE EDUCATION/TRAINING PROGRAM

## 2024-10-24 PROCEDURE — 77336 RADIATION PHYSICS CONSULT: CPT | Performed by: STUDENT IN AN ORGANIZED HEALTH CARE EDUCATION/TRAINING PROGRAM

## 2024-10-24 PROCEDURE — 77295 3-D RADIOTHERAPY PLAN: CPT | Performed by: STUDENT IN AN ORGANIZED HEALTH CARE EDUCATION/TRAINING PROGRAM

## 2024-10-24 PROCEDURE — 77427 RADIATION TX MANAGEMENT X5: CPT | Performed by: STUDENT IN AN ORGANIZED HEALTH CARE EDUCATION/TRAINING PROGRAM

## 2024-10-24 PROCEDURE — 77387 GUIDANCE FOR RADJ TX DLVR: CPT | Performed by: STUDENT IN AN ORGANIZED HEALTH CARE EDUCATION/TRAINING PROGRAM

## 2024-10-24 PROCEDURE — 77412 RADIATION TX DELIVERY LVL 3: CPT | Performed by: STUDENT IN AN ORGANIZED HEALTH CARE EDUCATION/TRAINING PROGRAM

## 2024-10-25 ENCOUNTER — HOSPITAL ENCOUNTER (OUTPATIENT)
Dept: RADIATION ONCOLOGY | Facility: HOSPITAL | Age: 84
Discharge: HOME | End: 2024-10-25

## 2024-10-25 LAB
RAD ONC ARIA COURSE ID: NORMAL
RAD ONC ARIA COURSE LAST TREATMENT DATE: NORMAL
RAD ONC ARIA COURSE START DATE: NORMAL
RAD ONC ARIA COURSE TREATMENT ELAPSED DAYS: 1
RAD ONC ARIA FIRST TREATMENT DATE: NORMAL
RAD ONC ARIA PLAN FRACTIONS TREATED TO DATE: 2
RAD ONC ARIA PLAN ID: NORMAL
RAD ONC ARIA PLAN PRESCRIBED DOSE PER FRACTION: 3 GY
RAD ONC ARIA PLAN PRIMARY REFERENCE POINT: NORMAL
RAD ONC ARIA PLAN TOTAL FRACTIONS PRESCRIBED: 10
RAD ONC ARIA PLAN TOTAL PRESCRIBED DOSE: 3000 CGY
RAD ONC ARIA REFERENCE POINT DOSAGE GIVEN TO DATE: 6 GY
RAD ONC ARIA REFERENCE POINT ID: NORMAL
RAD ONC ARIA REFERENCE POINT SESSION DOSAGE GIVEN: 3 GY

## 2024-10-25 PROCEDURE — 77387 GUIDANCE FOR RADJ TX DLVR: CPT | Performed by: STUDENT IN AN ORGANIZED HEALTH CARE EDUCATION/TRAINING PROGRAM

## 2024-10-25 PROCEDURE — G6002 STEREOSCOPIC X-RAY GUIDANCE: HCPCS | Performed by: STUDENT IN AN ORGANIZED HEALTH CARE EDUCATION/TRAINING PROGRAM

## 2024-10-25 PROCEDURE — 77412 RADIATION TX DELIVERY LVL 3: CPT | Performed by: STUDENT IN AN ORGANIZED HEALTH CARE EDUCATION/TRAINING PROGRAM

## 2024-10-28 ENCOUNTER — PRE-ADMISSION TESTING (OUTPATIENT)
Dept: PREADMISSION TESTING | Facility: HOSPITAL | Age: 84
End: 2024-10-28
Payer: MEDICARE

## 2024-10-28 ENCOUNTER — HOSPITAL ENCOUNTER (OUTPATIENT)
Dept: RADIATION ONCOLOGY | Facility: HOSPITAL | Age: 84
Discharge: HOME | End: 2024-10-28
Payer: MEDICARE

## 2024-10-28 ENCOUNTER — RADIATION ONCOLOGY WEEKLY ASSESSMENT (OUTPATIENT)
Dept: RADIATION ONCOLOGY | Facility: HOSPITAL | Age: 84
End: 2024-10-28
Payer: MEDICARE

## 2024-10-28 VITALS
HEART RATE: 92 BPM | TEMPERATURE: 97.3 F | WEIGHT: 109 LBS | RESPIRATION RATE: 18 BRPM | HEIGHT: 60 IN | BODY MASS INDEX: 21.4 KG/M2 | OXYGEN SATURATION: 96 % | DIASTOLIC BLOOD PRESSURE: 71 MMHG | SYSTOLIC BLOOD PRESSURE: 125 MMHG

## 2024-10-28 VITALS
OXYGEN SATURATION: 95 % | RESPIRATION RATE: 20 BRPM | TEMPERATURE: 98 F | DIASTOLIC BLOOD PRESSURE: 75 MMHG | SYSTOLIC BLOOD PRESSURE: 121 MMHG | HEART RATE: 87 BPM

## 2024-10-28 DIAGNOSIS — C79.51: ICD-10-CM

## 2024-10-28 DIAGNOSIS — C79.51 MALIGNANT NEOPLASM METASTATIC TO PELVIC BONE WITH UNKNOWN PRIMARY SITE: Primary | ICD-10-CM

## 2024-10-28 DIAGNOSIS — C80.1 MALIGNANT NEOPLASM METASTATIC TO PELVIC BONE WITH UNKNOWN PRIMARY SITE: Primary | ICD-10-CM

## 2024-10-28 DIAGNOSIS — C80.1: ICD-10-CM

## 2024-10-28 LAB
ANION GAP SERPL CALCULATED.3IONS-SCNC: 8.8 MMOL/L (ref 5–15)
BUN SERPL-MCNC: 13 MG/DL (ref 8–23)
BUN/CREAT SERPL: 13.7 (ref 7–25)
CALCIUM SPEC-SCNC: 9.2 MG/DL (ref 8.6–10.5)
CHLORIDE SERPL-SCNC: 92 MMOL/L (ref 98–107)
CO2 SERPL-SCNC: 33.2 MMOL/L (ref 22–29)
CREAT SERPL-MCNC: 0.95 MG/DL (ref 0.57–1)
DEPRECATED RDW RBC AUTO: 39.8 FL (ref 37–54)
EGFRCR SERPLBLD CKD-EPI 2021: 59.9 ML/MIN/1.73
ERYTHROCYTE [DISTWIDTH] IN BLOOD BY AUTOMATED COUNT: 13.3 % (ref 12.3–15.4)
GLUCOSE SERPL-MCNC: 134 MG/DL (ref 65–99)
HCT VFR BLD AUTO: 36.2 % (ref 34–46.6)
HGB BLD-MCNC: 11.1 G/DL (ref 12–15.9)
MCH RBC QN AUTO: 25.1 PG (ref 26.6–33)
MCHC RBC AUTO-ENTMCNC: 30.7 G/DL (ref 31.5–35.7)
MCV RBC AUTO: 81.9 FL (ref 79–97)
PLATELET # BLD AUTO: 338 10*3/MM3 (ref 140–450)
PMV BLD AUTO: 9.4 FL (ref 6–12)
POTASSIUM SERPL-SCNC: 3.2 MMOL/L (ref 3.5–5.2)
RAD ONC ARIA COURSE ID: NORMAL
RAD ONC ARIA COURSE LAST TREATMENT DATE: NORMAL
RAD ONC ARIA COURSE START DATE: NORMAL
RAD ONC ARIA COURSE TREATMENT ELAPSED DAYS: 4
RAD ONC ARIA FIRST TREATMENT DATE: NORMAL
RAD ONC ARIA PLAN FRACTIONS TREATED TO DATE: 3
RAD ONC ARIA PLAN ID: NORMAL
RAD ONC ARIA PLAN PRESCRIBED DOSE PER FRACTION: 3 GY
RAD ONC ARIA PLAN PRIMARY REFERENCE POINT: NORMAL
RAD ONC ARIA PLAN TOTAL FRACTIONS PRESCRIBED: 10
RAD ONC ARIA PLAN TOTAL PRESCRIBED DOSE: 3000 CGY
RAD ONC ARIA REFERENCE POINT DOSAGE GIVEN TO DATE: 9 GY
RAD ONC ARIA REFERENCE POINT ID: NORMAL
RAD ONC ARIA REFERENCE POINT SESSION DOSAGE GIVEN: 3 GY
RBC # BLD AUTO: 4.42 10*6/MM3 (ref 3.77–5.28)
SODIUM SERPL-SCNC: 134 MMOL/L (ref 136–145)
WBC NRBC COR # BLD AUTO: 8.36 10*3/MM3 (ref 3.4–10.8)

## 2024-10-28 PROCEDURE — FACE2FACE: Performed by: INTERNAL MEDICINE

## 2024-10-28 PROCEDURE — 77387 GUIDANCE FOR RADJ TX DLVR: CPT | Performed by: STUDENT IN AN ORGANIZED HEALTH CARE EDUCATION/TRAINING PROGRAM

## 2024-10-28 PROCEDURE — 80048 BASIC METABOLIC PNL TOTAL CA: CPT

## 2024-10-28 PROCEDURE — 77412 RADIATION TX DELIVERY LVL 3: CPT | Performed by: STUDENT IN AN ORGANIZED HEALTH CARE EDUCATION/TRAINING PROGRAM

## 2024-10-28 PROCEDURE — 36415 COLL VENOUS BLD VENIPUNCTURE: CPT

## 2024-10-28 PROCEDURE — G6002 STEREOSCOPIC X-RAY GUIDANCE: HCPCS | Performed by: STUDENT IN AN ORGANIZED HEALTH CARE EDUCATION/TRAINING PROGRAM

## 2024-10-28 PROCEDURE — 85027 COMPLETE CBC AUTOMATED: CPT

## 2024-10-28 NOTE — DISCHARGE INSTRUCTIONS
Take the following medications the morning of surgery:      AMLODIPINE,  ESCITALOPRAM  AND OMEPRAZOLE    If you are on prescription narcotic pain medication to control your pain you may also take that medication the morning of surgery.      General Instructions:     Do not eat solid food after midnight the night before surgery.  Clear liquids day of surgery are allowed but must be stopped at least two hours before your hospital arrival time.       Allowed clear liquids      Water, sodas, and tea or coffee with no cream or milk added.       12 to 20 ounces of a clear liquid that contains carbohydrates is recommended.  If non-diabetic, have Gatorade or Powerade.  If diabetic, have G2 or Powerade Zero.     Do not have liquids red in color.  Do not consume chicken, beef, pork or vegetable broth or bouillon cubes of any variety as they are not considered clear liquids and are not allowed.      Infants may have breast milk up to four hours before surgery.  Infants drinking formula may drink formula up to six hours before surgery.   Patients who avoid smoking, chewing tobacco and alcohol for 4 weeks prior to surgery have a reduced risk of post-operative complications.  Quit smoking as many days before surgery as you can.  Do not smoke, use chewing tobacco or drink alcohol the day of surgery.   If applicable bring your C-PAP/ BI-PAP machine in with you to preop day of surgery.  Bring any papers given to you in the doctor’s office.  Wear clean comfortable clothes.  Do not wear contact lenses, false eyelashes or make-up.  Bring a case for your glasses.   Bring crutches or walker if applicable.  Remove all piercings.  Leave jewelry and any other valuables at home.  Hair extensions with metal clips must be removed prior to surgery.  The Pre-Admission Testing nurse will instruct you to bring medications if unable to obtain an accurate list in Pre-Admission Testing.          Preventing a Surgical Site Infection:  For 2 to 3 days  before surgery, avoid shaving with a razor because the razor can irritate skin and make it easier to develop an infection.    Any areas of open skin can increase the risk of a post-operative wound infection by allowing bacteria to enter and travel throughout the body.  Notify your surgeon if you have any skin wounds / rashes even if it is not near the expected surgical site.  The area will need assessed to determine if surgery should be delayed until it is healed.  The night prior to surgery shower using a fresh bar of anti-bacterial soap (such as Dial) and clean washcloth.  Sleep in a clean bed with clean clothing.  Do not allow pets to sleep with you.  Shower on the morning of surgery using a fresh bar of anti-bacterial soap (such as Dial) and clean washcloth.  Dry with a clean towel and dress in clean clothing.  Ask your surgeon if you will be receiving antibiotics prior to surgery.  Make sure you, your family, and all healthcare providers clean their hands with soap and water or an alcohol based hand  before caring for you or your wound.    Day of surgery:  Your arrival time is approximately two hours before your scheduled surgery time.  Please note if you have an early arrival time the surgery doors do not open before 5:00 AM.  Upon arrival, a Pre-op nurse and Anesthesiologist will review your health history, obtain vital signs, and answer questions you may have.  The only belongings needed at this time will be a list of your home medications and if applicable your C-PAP/BI-PAP machine.  A Pre-op nurse will start an IV and you may receive medication in preparation for surgery, including something to help you relax.     Please be aware that surgery does come with discomfort.  We want to make every effort to control your discomfort so please discuss any uncontrolled symptoms with your nurse.   Your doctor will most likely have prescribed pain medications.      If you are going home after surgery you will  receive individualized written care instructions before being discharged.  A responsible adult must drive you to and from the hospital on the day of your surgery and ideally stay with you through the night.   .  Discharge prescriptions can be filled by the hospital pharmacy during regular pharmacy hours.  If you are having surgery late in the day/evening your prescription may be e-prescribed to your pharmacy.  Please verify your pharmacy hours or chose a 24 hour pharmacy to avoid not having access to your prescription because your pharmacy has closed for the day.    If you are staying overnight following surgery, you will be transported to your hospital room following the recovery period.  [facility] has all private rooms.    If you have any questions please call Pre-Admission Testing at [phone].  Deductibles and co-payments are collected on the day of service. Please be prepared to pay the required co-pay, deductible or deposit on the day of service as defined by your plan.    Call your surgeon immediately if you experience any of the following symptoms:  Sore Throat  Shortness of Breath or difficulty breathing  Cough  Chills  Body soreness or muscle pain  Headache  Fever  New loss of taste or smell  Do not arrive for your surgery ill.  Your procedure will need to be rescheduled to another time.  You will need to call your physician before the day of surgery to avoid any unnecessary exposure to hospital staff as well as other patients.

## 2024-10-28 NOTE — PROGRESS NOTES
[facility] RADIATION ONCOLOGY  ON-TREATMENT VISIT NOTE    NAME: Madelyn Madrid  YOB: 1940  MRN #: [id number]  DATE OF SERVICE: 10/28/2024  PRESCRIBING PHYSICIAN: No care team member to display     DIAGNOSIS:      ICD-10-CM ICD-9-CM   1. 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  C79.51 198.5    C80.1 199.1      RADIATION THERAPY VISIT:  Continue radiation therapy, Dosimetry plan remains acceptable, Films reviewed and remains acceptable, Pain assessed, Pain management planned, Radiation dose schedule reviewed and remains acceptable, Radiation technique remains acceptable, and Symptoms within expected range    Radiation Treatments       Active   Plans   L3   Most recent treatment: Dose planned: 300 cGy (fraction 3 on 10/28/2024)   Total: Dose planned: 3,000 cGy (10 fractions)   Elapsed Days: 4      Reference Points   Pelvis   Most recent treatment: Dose given: 300 cGy (on 10/28/2024)   Total: Dose given: 900 cGy   Elapsed Days: 4                    [] Concurrent Chemo   Regimen:  n/a     PHYSICAL ASSESSMENT         Vitals:    10/28/24 0907   BP: 121/75   Pulse: 87   Resp: 20   Temp: 98 °F (36.7 °C)   SpO2: 95%      Wt Readings from Last 3 Encounters:   10/23/24 50 kg (110 lb 3.7 oz)   10/21/24 49.9 kg (110 lb)   10/16/24 50.3 kg (111 lb)     Ambulatory and capable of all selfcare but unable to carry out any work activities; up and about more than 50% of waking hours = 2    We examined the relevant areas: yes  Findings are within the expected range for this stage of treatment: yes    ACTION ITEMS     Patient tolerating treatment well and as expected for this stage in their treatment and Continue radiation therapy as planned    Estimated Completion Date: 11/6/2024    Anticipatory guidance provided.    Patient scheduled for surgery on  Thursday for surgical stabilization of the right arm with Dr. Altman.

## 2024-10-29 ENCOUNTER — HOSPITAL ENCOUNTER (OUTPATIENT)
Dept: RADIATION ONCOLOGY | Facility: HOSPITAL | Age: 84
Discharge: HOME | End: 2024-10-29

## 2024-10-29 LAB
RAD ONC ARIA COURSE ID: NORMAL
RAD ONC ARIA COURSE LAST TREATMENT DATE: NORMAL
RAD ONC ARIA COURSE START DATE: NORMAL
RAD ONC ARIA COURSE TREATMENT ELAPSED DAYS: 5
RAD ONC ARIA FIRST TREATMENT DATE: NORMAL
RAD ONC ARIA PLAN FRACTIONS TREATED TO DATE: 4
RAD ONC ARIA PLAN ID: NORMAL
RAD ONC ARIA PLAN PRESCRIBED DOSE PER FRACTION: 3 GY
RAD ONC ARIA PLAN PRIMARY REFERENCE POINT: NORMAL
RAD ONC ARIA PLAN TOTAL FRACTIONS PRESCRIBED: 10
RAD ONC ARIA PLAN TOTAL PRESCRIBED DOSE: 3000 CGY
RAD ONC ARIA REFERENCE POINT DOSAGE GIVEN TO DATE: 12 GY
RAD ONC ARIA REFERENCE POINT ID: NORMAL
RAD ONC ARIA REFERENCE POINT SESSION DOSAGE GIVEN: 3 GY

## 2024-10-29 PROCEDURE — G6002 STEREOSCOPIC X-RAY GUIDANCE: HCPCS | Performed by: STUDENT IN AN ORGANIZED HEALTH CARE EDUCATION/TRAINING PROGRAM

## 2024-10-29 PROCEDURE — 77412 RADIATION TX DELIVERY LVL 3: CPT | Performed by: STUDENT IN AN ORGANIZED HEALTH CARE EDUCATION/TRAINING PROGRAM

## 2024-10-29 PROCEDURE — 77387 GUIDANCE FOR RADJ TX DLVR: CPT | Performed by: STUDENT IN AN ORGANIZED HEALTH CARE EDUCATION/TRAINING PROGRAM

## 2024-10-30 ENCOUNTER — PATIENT OUTREACH (OUTPATIENT)
Dept: ONCOLOGY | Facility: CLINIC | Age: 84
End: 2024-10-30
Payer: MEDICARE

## 2024-10-30 ENCOUNTER — HOSPITAL ENCOUNTER (OUTPATIENT)
Dept: RADIATION ONCOLOGY | Facility: HOSPITAL | Age: 84
Discharge: HOME | End: 2024-10-30

## 2024-10-30 ENCOUNTER — OFFICE VISIT (OUTPATIENT)
Dept: SURGERY | Facility: CLINIC | Age: 84
End: 2024-10-30
Payer: MEDICARE

## 2024-10-30 VITALS
SYSTOLIC BLOOD PRESSURE: 111 MMHG | OXYGEN SATURATION: 93 % | BODY MASS INDEX: 21.09 KG/M2 | WEIGHT: 108 LBS | HEART RATE: 89 BPM | DIASTOLIC BLOOD PRESSURE: 81 MMHG | RESPIRATION RATE: 16 BRPM

## 2024-10-30 DIAGNOSIS — C34.90 MALIGNANT NEOPLASM OF BRONCHUS AND LUNG: Primary | ICD-10-CM

## 2024-10-30 LAB
RAD ONC ARIA COURSE ID: NORMAL
RAD ONC ARIA COURSE LAST TREATMENT DATE: NORMAL
RAD ONC ARIA COURSE START DATE: NORMAL
RAD ONC ARIA COURSE TREATMENT ELAPSED DAYS: 6
RAD ONC ARIA FIRST TREATMENT DATE: NORMAL
RAD ONC ARIA PLAN FRACTIONS TREATED TO DATE: 5
RAD ONC ARIA PLAN ID: NORMAL
RAD ONC ARIA PLAN PRESCRIBED DOSE PER FRACTION: 3 GY
RAD ONC ARIA PLAN PRIMARY REFERENCE POINT: NORMAL
RAD ONC ARIA PLAN TOTAL FRACTIONS PRESCRIBED: 10
RAD ONC ARIA PLAN TOTAL PRESCRIBED DOSE: 3000 CGY
RAD ONC ARIA REFERENCE POINT DOSAGE GIVEN TO DATE: 15 GY
RAD ONC ARIA REFERENCE POINT ID: NORMAL
RAD ONC ARIA REFERENCE POINT SESSION DOSAGE GIVEN: 3 GY

## 2024-10-30 PROCEDURE — 77387 GUIDANCE FOR RADJ TX DLVR: CPT | Performed by: STUDENT IN AN ORGANIZED HEALTH CARE EDUCATION/TRAINING PROGRAM

## 2024-10-30 PROCEDURE — 1160F RVW MEDS BY RX/DR IN RCRD: CPT | Performed by: THORACIC SURGERY (CARDIOTHORACIC VASCULAR SURGERY)

## 2024-10-30 PROCEDURE — 1159F MED LIST DOCD IN RCRD: CPT | Performed by: THORACIC SURGERY (CARDIOTHORACIC VASCULAR SURGERY)

## 2024-10-30 PROCEDURE — 3074F SYST BP LT 130 MM HG: CPT | Performed by: THORACIC SURGERY (CARDIOTHORACIC VASCULAR SURGERY)

## 2024-10-30 PROCEDURE — 77412 RADIATION TX DELIVERY LVL 3: CPT | Performed by: STUDENT IN AN ORGANIZED HEALTH CARE EDUCATION/TRAINING PROGRAM

## 2024-10-30 PROCEDURE — 99214 OFFICE O/P EST MOD 30 MIN: CPT | Performed by: THORACIC SURGERY (CARDIOTHORACIC VASCULAR SURGERY)

## 2024-10-30 PROCEDURE — G6002 STEREOSCOPIC X-RAY GUIDANCE: HCPCS | Performed by: STUDENT IN AN ORGANIZED HEALTH CARE EDUCATION/TRAINING PROGRAM

## 2024-10-30 PROCEDURE — 3079F DIAST BP 80-89 MM HG: CPT | Performed by: THORACIC SURGERY (CARDIOTHORACIC VASCULAR SURGERY)

## 2024-10-30 NOTE — LETTER
"October 30, 2024     Therese Martinez MD  [address]  [place] [address]    Patient: Madelyn Madrid   YOB: 1940   Date of Visit: 10/30/2024     Dear Therese Martinez MD:       Thank you for referring Madelyn Madrid to me for evaluation. Below are the relevant portions of my assessment and plan of care.    If you have questions, please do not hesitate to call me. I look forward to following Madelyn along with you.         Sincerely,        Deann Iqbal MD        CC: No Recipients    Deann Iqbal MD  10/30/24 1504  Sign when Signing Visit  Chief Complaint  Non-small cell lung cancer    Subjective   {Problem List  Visit Diagnosis   Encounters  Notes  Medications  Labs  Result Review Imaging  Media :23}    Madelyn Madrid presents to [facility] THORACIC SURGERY  History of Present Illness  Ms. Madrid is a pleasant 84-year-old lady who presents in follow-up after ion bronchoscopy for her diagnosed metastatic adenocarcinoma.  She is having worsening arm pain is scheduled for surgical fixation of her right arm tomorrow  Objective  Vital Signs:  /81 (BP Location: Right arm, Patient Position: Sitting, Cuff Size: Adult)   Pulse 89   Resp 16   Wt 49 kg (108 lb)   SpO2 93%   BMI 21.09 kg/m²   Estimated body mass index is 21.09 kg/m² as calculated from the following:    Height as of 10/28/24: 152.4 cm (60\").    Weight as of this encounter: 49 kg (108 lb).    BMI is within normal parameters. No other follow-up for BMI required.      Physical Exam  Vitals and nursing note reviewed.   Constitutional:       Appearance: She is well-developed.   HENT:      Head: Normocephalic and atraumatic.      Nose: Nose normal.   Eyes:      Conjunctiva/sclera: Conjunctivae normal.   Cardiovascular:      Rate and Rhythm: Normal rate.   Pulmonary:      Effort: Pulmonary effort is normal.   Abdominal:      Palpations: Abdomen is soft.   Musculoskeletal:      Cervical back: Neck " supple.   Skin:     General: Skin is warm and dry.   Neurological:      Mental Status: She is alert and oriented to person, place, and time.   Psychiatric:         Behavior: Behavior normal.         Thought Content: Thought content normal.         Judgment: Judgment normal.        Result Review:{Labs  Result Review  Imaging  Med Tab  Media  Procedures :23}  {The following data was reviewed by (Optional):46033}  {Ambulatory Labs (Optional):02799}  {Data reviewed (Optional):97122:::1}    I have independently reviewed the PET CT scan performed on 10/24/2024 which demonstrates a hypermetabolic right lower lobe lung mass, hypermetabolic right hilar lymph nodes, hypermetabolic liver mass, multiple hypermetabolic osseous lesions including T5, T9 vertebral body right pelvis and right humerus.    Pathology with moderately differentiated adenocarcinoma     Assessment and Plan {CC Problem List  Visit Diagnosis   ROS  Review (Popup)  [facility] Maintenance  Quality  BestPractice  Medications  SmartSets  SnapShot Encounters  Media :23}  There are no diagnoses linked to this encounter.  There is a pleasant 84-year-old lady with stage IV adenocarcinoma of the lung.  She is getting palliative radiation at this time.  She will plan to follow-up with Dr. Ghosh to discuss systemic treatment.  Will plan to arrange a port for her in the near future.  I have reached out to Dr. Hughes to see if he is available to place a port.     I spent 30 minutes caring for Madelyn on this date of service. This time includes time spent by me in the following activities:preparing for the visit, reviewing tests, obtaining and/or reviewing a separately obtained history, performing a medically appropriate examination and/or evaluation , counseling and educating the patient/family/caregiver, ordering medications, tests, or procedures, documenting information in the medical record, and independently interpreting results and communicating that  information with the patient/family/caregiver  Follow Up {Instructions Charge Capture  Follow-up Communications :23}  No follow-ups on file.  Patient was given instructions and counseling regarding her condition or for health maintenance advice. Please see specific information pulled into the AVS if appropriate.

## 2024-10-30 NOTE — PROGRESS NOTES
"Chief Complaint  Non-small cell lung cancer    Subjective        Madelyn Madrid presents to [facility] THORACIC SURGERY  History of Present Illness  Ms. Madrid is a pleasant 84-year-old lady who presents in follow-up after ion bronchoscopy for her diagnosed metastatic adenocarcinoma.  She is having worsening arm pain is scheduled for surgical fixation of her right arm tomorrow  Objective   Vital Signs:  /81 (BP Location: Right arm, Patient Position: Sitting, Cuff Size: Adult)   Pulse 89   Resp 16   Wt 49 kg (108 lb)   SpO2 93%   BMI 21.09 kg/m²   Estimated body mass index is 21.09 kg/m² as calculated from the following:    Height as of 10/28/24: 152.4 cm (60\").    Weight as of this encounter: 49 kg (108 lb).    BMI is within normal parameters. No other follow-up for BMI required.      Physical Exam  Vitals and nursing note reviewed.   Constitutional:       Appearance: She is well-developed.   HENT:      Head: Normocephalic and atraumatic.      Nose: Nose normal.   Eyes:      Conjunctiva/sclera: Conjunctivae normal.   Cardiovascular:      Rate and Rhythm: Normal rate.   Pulmonary:      Effort: Pulmonary effort is normal.   Abdominal:      Palpations: Abdomen is soft.   Musculoskeletal:      Cervical back: Neck supple.   Skin:     General: Skin is warm and dry.   Neurological:      Mental Status: She is alert and oriented to person, place, and time.   Psychiatric:         Behavior: Behavior normal.         Thought Content: Thought content normal.         Judgment: Judgment normal.        Result Review :          I have independently reviewed the PET CT scan performed on 10/24/2024 which demonstrates a hypermetabolic right lower lobe lung mass, hypermetabolic right hilar lymph nodes, hypermetabolic liver mass, multiple hypermetabolic osseous lesions including T5, T9 vertebral body right pelvis and right humerus.    Pathology with moderately differentiated adenocarcinoma     Assessment " and Plan   Diagnoses and all orders for this visit:    1. Malignant neoplasm of bronchus and lung (Primary)      There is a pleasant 84-year-old lady with stage IV adenocarcinoma of the lung.  She is getting palliative radiation at this time.  She will plan to follow-up with Dr. Ghosh to discuss systemic treatment.  Will plan to arrange a port for her in the near future.  I have reached out to Dr. Hughes to see if he is available to place a port.     I spent 30 minutes caring for Madelyn on this date of service. This time includes time spent by me in the following activities:preparing for the visit, reviewing tests, obtaining and/or reviewing a separately obtained history, performing a medically appropriate examination and/or evaluation , counseling and educating the patient/family/caregiver, ordering medications, tests, or procedures, documenting information in the medical record, and independently interpreting results and communicating that information with the patient/family/caregiver  Follow Up   No follow-ups on file.  Patient was given instructions and counseling regarding her condition or for health maintenance advice. Please see specific information pulled into the AVS if appropriate.

## 2024-10-30 NOTE — LETTER
"November 8, 2024     Therese Martinez MD  [address]  [place] [address]    Patient: Madelyn Madrid   YOB: 1940   Date of Visit: 10/30/2024     Dear Therese Martinez MD:       Thank you for referring Madelyn Madrid to me for evaluation. Below are the relevant portions of my assessment and plan of care.    If you have questions, please do not hesitate to call me. I look forward to following Madelyn along with you.         Sincerely,        Deann Iqbal MD        CC: No Recipients    Deann Iqbal MD  11/08/24 0741  Sign when Signing Visit  Chief Complaint  Non-small cell lung cancer    Subjective       Madelyn Madrid presents to [facility] THORACIC SURGERY  History of Present Illness  Ms. Madrid is a pleasant 84-year-old lady who presents in follow-up after ion bronchoscopy for her diagnosed metastatic adenocarcinoma.  She is having worsening arm pain is scheduled for surgical fixation of her right arm tomorrow  Objective  Vital Signs:  /81 (BP Location: Right arm, Patient Position: Sitting, Cuff Size: Adult)   Pulse 89   Resp 16   Wt 49 kg (108 lb)   SpO2 93%   BMI 21.09 kg/m²   Estimated body mass index is 21.09 kg/m² as calculated from the following:    Height as of 10/28/24: 152.4 cm (60\").    Weight as of this encounter: 49 kg (108 lb).    BMI is within normal parameters. No other follow-up for BMI required.      Physical Exam  Vitals and nursing note reviewed.   Constitutional:       Appearance: She is well-developed.   HENT:      Head: Normocephalic and atraumatic.      Nose: Nose normal.   Eyes:      Conjunctiva/sclera: Conjunctivae normal.   Cardiovascular:      Rate and Rhythm: Normal rate.   Pulmonary:      Effort: Pulmonary effort is normal.   Abdominal:      Palpations: Abdomen is soft.   Musculoskeletal:      Cervical back: Neck supple.   Skin:     General: Skin is warm and dry.   Neurological:      Mental Status: She is alert and oriented " to person, place, and time.   Psychiatric:         Behavior: Behavior normal.         Thought Content: Thought content normal.         Judgment: Judgment normal.        Result Review:          I have independently reviewed the PET CT scan performed on 10/24/2024 which demonstrates a hypermetabolic right lower lobe lung mass, hypermetabolic right hilar lymph nodes, hypermetabolic liver mass, multiple hypermetabolic osseous lesions including T5, T9 vertebral body right pelvis and right humerus.    Pathology with moderately differentiated adenocarcinoma     Assessment and Plan   Diagnoses and all orders for this visit:    1. Malignant neoplasm of bronchus and lung (Primary)      There is a pleasant 84-year-old lady with stage IV adenocarcinoma of the lung.  She is getting palliative radiation at this time.  She will plan to follow-up with Dr. Ghosh to discuss systemic treatment.  Will plan to arrange a port for her in the near future.  I have reached out to Dr. Hughes to see if he is available to place a port.     I spent 30 minutes caring for Madelyn on this date of service. This time includes time spent by me in the following activities:preparing for the visit, reviewing tests, obtaining and/or reviewing a separately obtained history, performing a medically appropriate examination and/or evaluation , counseling and educating the patient/family/caregiver, ordering medications, tests, or procedures, documenting information in the medical record, and independently interpreting results and communicating that information with the patient/family/caregiver  Follow Up   No follow-ups on file.  Patient was given instructions and counseling regarding her condition or for health maintenance advice. Please see specific information pulled into the AVS if appropriate.

## 2024-10-31 ENCOUNTER — ANESTHESIA (OUTPATIENT)
Dept: PERIOP | Facility: HOSPITAL | Age: 84
End: 2024-10-31
Payer: MEDICARE

## 2024-10-31 ENCOUNTER — APPOINTMENT (OUTPATIENT)
Dept: GENERAL RADIOLOGY | Facility: HOSPITAL | Age: 84
DRG: 492 | End: 2024-10-31
Payer: MEDICARE

## 2024-10-31 ENCOUNTER — HOSPITAL ENCOUNTER (INPATIENT)
Facility: HOSPITAL | Age: 84
LOS: 8 days | Discharge: NURSING, REHAB OR LONG-TERM CARE | DRG: 492 | End: 2024-11-11
Attending: ORTHOPAEDIC SURGERY | Admitting: ORTHOPAEDIC SURGERY
Payer: MEDICARE

## 2024-10-31 ENCOUNTER — ANESTHESIA EVENT (OUTPATIENT)
Dept: PERIOP | Facility: HOSPITAL | Age: 84
End: 2024-10-31
Payer: MEDICARE

## 2024-10-31 ENCOUNTER — HOSPITAL ENCOUNTER (OUTPATIENT)
Dept: RADIATION ONCOLOGY | Facility: HOSPITAL | Age: 84
Discharge: HOME | End: 2024-10-31

## 2024-10-31 ENCOUNTER — TREATMENT (OUTPATIENT)
Dept: RADIATION ONCOLOGY | Facility: HOSPITAL | Age: 84
End: 2024-10-31

## 2024-10-31 DIAGNOSIS — C80.1: Primary | ICD-10-CM

## 2024-10-31 DIAGNOSIS — C79.51 MALIGNANT NEOPLASM METASTATIC TO PELVIC BONE WITH UNKNOWN PRIMARY SITE: Primary | ICD-10-CM

## 2024-10-31 DIAGNOSIS — C80.1 MALIGNANT NEOPLASM METASTATIC TO PELVIC BONE WITH UNKNOWN PRIMARY SITE: Primary | ICD-10-CM

## 2024-10-31 DIAGNOSIS — C79.51: Primary | ICD-10-CM

## 2024-10-31 PROBLEM — Z41.9 SURGERY, ELECTIVE: Status: ACTIVE | Noted: 2024-10-31

## 2024-10-31 LAB
ALBUMIN SERPL-MCNC: 3.5 G/DL (ref 3.5–5.2)
ALBUMIN/GLOB SERPL: 1 G/DL
ALP SERPL-CCNC: 102 U/L (ref 39–117)
ALT SERPL W P-5'-P-CCNC: 11 U/L (ref 1–33)
ANION GAP SERPL CALCULATED.3IONS-SCNC: 16.4 MMOL/L (ref 5–15)
AST SERPL-CCNC: 47 U/L (ref 1–32)
BASOPHILS # BLD AUTO: 0.01 10*3/MM3 (ref 0–0.2)
BASOPHILS NFR BLD AUTO: 0.1 % (ref 0–1.5)
BILIRUB SERPL-MCNC: 0.4 MG/DL (ref 0–1.2)
BUN SERPL-MCNC: 10 MG/DL (ref 8–23)
BUN/CREAT SERPL: 12.8 (ref 7–25)
CALCIUM SPEC-SCNC: 9 MG/DL (ref 8.6–10.5)
CHLORIDE SERPL-SCNC: 95 MMOL/L (ref 98–107)
CO2 SERPL-SCNC: 23.6 MMOL/L (ref 22–29)
CREAT SERPL-MCNC: 0.78 MG/DL (ref 0.57–1)
DEPRECATED RDW RBC AUTO: 38 FL (ref 37–54)
EGFRCR SERPLBLD CKD-EPI 2021: 75.9 ML/MIN/1.73
EOSINOPHIL # BLD AUTO: 0 10*3/MM3 (ref 0–0.4)
EOSINOPHIL NFR BLD AUTO: 0 % (ref 0.3–6.2)
ERYTHROCYTE [DISTWIDTH] IN BLOOD BY AUTOMATED COUNT: 13.1 % (ref 12.3–15.4)
GLOBULIN UR ELPH-MCNC: 3.4 GM/DL
GLUCOSE SERPL-MCNC: 146 MG/DL (ref 65–99)
HCT VFR BLD AUTO: 38.5 % (ref 34–46.6)
HGB BLD-MCNC: 12.3 G/DL (ref 12–15.9)
IMM GRANULOCYTES # BLD AUTO: 0.09 10*3/MM3 (ref 0–0.05)
IMM GRANULOCYTES NFR BLD AUTO: 0.8 % (ref 0–0.5)
LYMPHOCYTES # BLD AUTO: 0.38 10*3/MM3 (ref 0.7–3.1)
LYMPHOCYTES NFR BLD AUTO: 3.3 % (ref 19.6–45.3)
MCH RBC QN AUTO: 25.7 PG (ref 26.6–33)
MCHC RBC AUTO-ENTMCNC: 31.9 G/DL (ref 31.5–35.7)
MCV RBC AUTO: 80.5 FL (ref 79–97)
MONOCYTES # BLD AUTO: 0.37 10*3/MM3 (ref 0.1–0.9)
MONOCYTES NFR BLD AUTO: 3.2 % (ref 5–12)
NEUTROPHILS NFR BLD AUTO: 10.79 10*3/MM3 (ref 1.7–7)
NEUTROPHILS NFR BLD AUTO: 92.6 % (ref 42.7–76)
NRBC BLD AUTO-RTO: 0 /100 WBC (ref 0–0.2)
PLATELET # BLD AUTO: 382 10*3/MM3 (ref 140–450)
PMV BLD AUTO: 9.1 FL (ref 6–12)
POTASSIUM SERPL-SCNC: 3.3 MMOL/L (ref 3.5–5.2)
PROT SERPL-MCNC: 6.9 G/DL (ref 6–8.5)
RAD ONC ARIA COURSE ID: NORMAL
RAD ONC ARIA COURSE LAST TREATMENT DATE: NORMAL
RAD ONC ARIA COURSE START DATE: NORMAL
RAD ONC ARIA COURSE TREATMENT ELAPSED DAYS: 7
RAD ONC ARIA FIRST TREATMENT DATE: NORMAL
RAD ONC ARIA PLAN FRACTIONS TREATED TO DATE: 6
RAD ONC ARIA PLAN ID: NORMAL
RAD ONC ARIA PLAN PRESCRIBED DOSE PER FRACTION: 3 GY
RAD ONC ARIA PLAN PRIMARY REFERENCE POINT: NORMAL
RAD ONC ARIA PLAN TOTAL FRACTIONS PRESCRIBED: 10
RAD ONC ARIA PLAN TOTAL PRESCRIBED DOSE: 3000 CGY
RAD ONC ARIA REFERENCE POINT DOSAGE GIVEN TO DATE: 18 GY
RAD ONC ARIA REFERENCE POINT ID: NORMAL
RAD ONC ARIA REFERENCE POINT SESSION DOSAGE GIVEN: 3 GY
RBC # BLD AUTO: 4.78 10*6/MM3 (ref 3.77–5.28)
SODIUM SERPL-SCNC: 135 MMOL/L (ref 136–145)
WBC NRBC COR # BLD AUTO: 11.64 10*3/MM3 (ref 3.4–10.8)

## 2024-10-31 PROCEDURE — 25010000002 PROPOFOL 200 MG/20ML EMULSION: Performed by: REGISTERED NURSE

## 2024-10-31 PROCEDURE — 88307 TISSUE EXAM BY PATHOLOGIST: CPT | Performed by: ORTHOPAEDIC SURGERY

## 2024-10-31 PROCEDURE — 25010000002 ONDANSETRON PER 1 MG: Performed by: REGISTERED NURSE

## 2024-10-31 PROCEDURE — 25010000002 CEFAZOLIN PER 500 MG: Performed by: ORTHOPAEDIC SURGERY

## 2024-10-31 PROCEDURE — G0378 HOSPITAL OBSERVATION PER HR: HCPCS

## 2024-10-31 PROCEDURE — 25010000002 MIDAZOLAM PER 1 MG: Performed by: ANESTHESIOLOGY

## 2024-10-31 PROCEDURE — 77412 RADIATION TX DELIVERY LVL 3: CPT | Performed by: STUDENT IN AN ORGANIZED HEALTH CARE EDUCATION/TRAINING PROGRAM

## 2024-10-31 PROCEDURE — C9290 INJ, BUPIVACAINE LIPOSOME: HCPCS | Performed by: ANESTHESIOLOGY

## 2024-10-31 PROCEDURE — 25810000003 LACTATED RINGERS PER 1000 ML: Performed by: ANESTHESIOLOGY

## 2024-10-31 PROCEDURE — 25010000002 FENTANYL CITRATE (PF) 50 MCG/ML SOLUTION: Performed by: REGISTERED NURSE

## 2024-10-31 PROCEDURE — 88341 IMHCHEM/IMCYTCHM EA ADD ANTB: CPT | Performed by: ORTHOPAEDIC SURGERY

## 2024-10-31 PROCEDURE — G6002 STEREOSCOPIC X-RAY GUIDANCE: HCPCS | Performed by: STUDENT IN AN ORGANIZED HEALTH CARE EDUCATION/TRAINING PROGRAM

## 2024-10-31 PROCEDURE — 71045 X-RAY EXAM CHEST 1 VIEW: CPT

## 2024-10-31 PROCEDURE — 25010000002 DEXAMETHASONE SODIUM PHOSPHATE 20 MG/5ML SOLUTION: Performed by: REGISTERED NURSE

## 2024-10-31 PROCEDURE — 76000 FLUOROSCOPY <1 HR PHYS/QHP: CPT

## 2024-10-31 PROCEDURE — C1769 GUIDE WIRE: HCPCS | Performed by: ORTHOPAEDIC SURGERY

## 2024-10-31 PROCEDURE — 77387 GUIDANCE FOR RADJ TX DLVR: CPT | Performed by: STUDENT IN AN ORGANIZED HEALTH CARE EDUCATION/TRAINING PROGRAM

## 2024-10-31 PROCEDURE — 85025 COMPLETE CBC W/AUTO DIFF WBC: CPT | Performed by: STUDENT IN AN ORGANIZED HEALTH CARE EDUCATION/TRAINING PROGRAM

## 2024-10-31 PROCEDURE — 88342 IMHCHEM/IMCYTCHM 1ST ANTB: CPT | Performed by: ORTHOPAEDIC SURGERY

## 2024-10-31 PROCEDURE — C1713 ANCHOR/SCREW BN/BN,TIS/BN: HCPCS | Performed by: ORTHOPAEDIC SURGERY

## 2024-10-31 PROCEDURE — 0 BUPIVACAINE LIPOSOME 1.3 % SUSPENSION: Performed by: ANESTHESIOLOGY

## 2024-10-31 PROCEDURE — 80053 COMPREHEN METABOLIC PANEL: CPT | Performed by: STUDENT IN AN ORGANIZED HEALTH CARE EDUCATION/TRAINING PROGRAM

## 2024-10-31 PROCEDURE — 25010000002 LIDOCAINE 2% SOLUTION: Performed by: REGISTERED NURSE

## 2024-10-31 PROCEDURE — 0PSF06Z REPOSITION RIGHT HUMERAL SHAFT WITH INTRAMEDULLARY INTERNAL FIXATION DEVICE, OPEN APPROACH: ICD-10-PCS | Performed by: ORTHOPAEDIC SURGERY

## 2024-10-31 PROCEDURE — 73060 X-RAY EXAM OF HUMERUS: CPT

## 2024-10-31 PROCEDURE — 25010000002 BUPIVACAINE (PF) 0.25 % SOLUTION: Performed by: ANESTHESIOLOGY

## 2024-10-31 RX ORDER — HYDROCODONE BITARTRATE AND ACETAMINOPHEN 5; 325 MG/1; MG/1
1 TABLET ORAL EVERY 4 HOURS PRN
Qty: 18 TABLET | Refills: 0 | Status: SHIPPED | OUTPATIENT
Start: 2024-10-31

## 2024-10-31 RX ORDER — DEXAMETHASONE SODIUM PHOSPHATE 4 MG/ML
INJECTION, SOLUTION INTRA-ARTICULAR; INTRALESIONAL; INTRAMUSCULAR; INTRAVENOUS; SOFT TISSUE AS NEEDED
Status: DISCONTINUED | OUTPATIENT
Start: 2024-10-31 | End: 2024-10-31 | Stop reason: SURG

## 2024-10-31 RX ORDER — FENTANYL CITRATE 50 UG/ML
INJECTION, SOLUTION INTRAMUSCULAR; INTRAVENOUS AS NEEDED
Status: DISCONTINUED | OUTPATIENT
Start: 2024-10-31 | End: 2024-10-31 | Stop reason: SURG

## 2024-10-31 RX ORDER — AMOXICILLIN 250 MG
2 CAPSULE ORAL 2 TIMES DAILY PRN
Status: DISCONTINUED | OUTPATIENT
Start: 2024-10-31 | End: 2024-11-01

## 2024-10-31 RX ORDER — PANTOPRAZOLE SODIUM 40 MG/1
40 TABLET, DELAYED RELEASE ORAL
Status: DISCONTINUED | OUTPATIENT
Start: 2024-11-01 | End: 2024-11-11 | Stop reason: HOSPADM

## 2024-10-31 RX ORDER — MIDAZOLAM HYDROCHLORIDE 1 MG/ML
0.5 INJECTION, SOLUTION INTRAMUSCULAR; INTRAVENOUS
Status: DISCONTINUED | OUTPATIENT
Start: 2024-10-31 | End: 2024-10-31 | Stop reason: HOSPADM

## 2024-10-31 RX ORDER — BISACODYL 5 MG/1
5 TABLET, DELAYED RELEASE ORAL DAILY PRN
Status: DISCONTINUED | OUTPATIENT
Start: 2024-10-31 | End: 2024-11-11 | Stop reason: HOSPADM

## 2024-10-31 RX ORDER — BUPIVACAINE HYDROCHLORIDE 2.5 MG/ML
INJECTION, SOLUTION EPIDURAL; INFILTRATION; INTRACAUDAL
Status: COMPLETED
Start: 2024-10-31 | End: 2024-10-31

## 2024-10-31 RX ORDER — POTASSIUM CHLORIDE 750 MG/1
40 TABLET, FILM COATED, EXTENDED RELEASE ORAL EVERY 4 HOURS
Status: COMPLETED | OUTPATIENT
Start: 2024-10-31 | End: 2024-11-01

## 2024-10-31 RX ORDER — FENTANYL CITRATE 50 UG/ML
25 INJECTION, SOLUTION INTRAMUSCULAR; INTRAVENOUS
Status: DISCONTINUED | OUTPATIENT
Start: 2024-10-31 | End: 2024-10-31 | Stop reason: HOSPADM

## 2024-10-31 RX ORDER — FENTANYL CITRATE 50 UG/ML
50 INJECTION, SOLUTION INTRAMUSCULAR; INTRAVENOUS ONCE AS NEEDED
Status: DISCONTINUED | OUTPATIENT
Start: 2024-10-31 | End: 2024-10-31 | Stop reason: HOSPADM

## 2024-10-31 RX ORDER — IPRATROPIUM BROMIDE AND ALBUTEROL SULFATE 2.5; .5 MG/3ML; MG/3ML
3 SOLUTION RESPIRATORY (INHALATION) EVERY 6 HOURS PRN
Status: DISCONTINUED | OUTPATIENT
Start: 2024-10-31 | End: 2024-11-11 | Stop reason: HOSPADM

## 2024-10-31 RX ORDER — PROMETHAZINE HYDROCHLORIDE 25 MG/1
25 TABLET ORAL ONCE AS NEEDED
Status: DISCONTINUED | OUTPATIENT
Start: 2024-10-31 | End: 2024-10-31 | Stop reason: HOSPADM

## 2024-10-31 RX ORDER — LIDOCAINE HYDROCHLORIDE 20 MG/ML
INJECTION, SOLUTION INFILTRATION; PERINEURAL AS NEEDED
Status: DISCONTINUED | OUTPATIENT
Start: 2024-10-31 | End: 2024-10-31 | Stop reason: SURG

## 2024-10-31 RX ORDER — ONDANSETRON 2 MG/ML
4 INJECTION INTRAMUSCULAR; INTRAVENOUS EVERY 6 HOURS PRN
Status: DISCONTINUED | OUTPATIENT
Start: 2024-10-31 | End: 2024-11-11 | Stop reason: HOSPADM

## 2024-10-31 RX ORDER — PROMETHAZINE HYDROCHLORIDE 25 MG/1
25 SUPPOSITORY RECTAL ONCE AS NEEDED
Status: DISCONTINUED | OUTPATIENT
Start: 2024-10-31 | End: 2024-10-31 | Stop reason: HOSPADM

## 2024-10-31 RX ORDER — HYDROCODONE BITARTRATE AND ACETAMINOPHEN 7.5; 325 MG/1; MG/1
1 TABLET ORAL EVERY 4 HOURS PRN
Status: DISCONTINUED | OUTPATIENT
Start: 2024-10-31 | End: 2024-10-31 | Stop reason: HOSPADM

## 2024-10-31 RX ORDER — MAGNESIUM HYDROXIDE 1200 MG/15ML
LIQUID ORAL AS NEEDED
Status: DISCONTINUED | OUTPATIENT
Start: 2024-10-31 | End: 2024-10-31 | Stop reason: HOSPADM

## 2024-10-31 RX ORDER — BUPIVACAINE HYDROCHLORIDE 2.5 MG/ML
INJECTION, SOLUTION EPIDURAL; INFILTRATION; INTRACAUDAL
Status: COMPLETED | OUTPATIENT
Start: 2024-10-31 | End: 2024-10-31

## 2024-10-31 RX ORDER — AMLODIPINE BESYLATE 10 MG/1
10 TABLET ORAL DAILY
Status: DISCONTINUED | OUTPATIENT
Start: 2024-11-01 | End: 2024-11-11 | Stop reason: HOSPADM

## 2024-10-31 RX ORDER — ROCURONIUM BROMIDE 10 MG/ML
INJECTION, SOLUTION INTRAVENOUS AS NEEDED
Status: DISCONTINUED | OUTPATIENT
Start: 2024-10-31 | End: 2024-10-31 | Stop reason: SURG

## 2024-10-31 RX ORDER — SODIUM CHLORIDE 0.9 % (FLUSH) 0.9 %
3 SYRINGE (ML) INJECTION EVERY 12 HOURS SCHEDULED
Status: DISCONTINUED | OUTPATIENT
Start: 2024-10-31 | End: 2024-10-31 | Stop reason: HOSPADM

## 2024-10-31 RX ORDER — FAMOTIDINE 10 MG/ML
20 INJECTION, SOLUTION INTRAVENOUS ONCE
Status: COMPLETED | OUTPATIENT
Start: 2024-10-31 | End: 2024-10-31

## 2024-10-31 RX ORDER — ONDANSETRON 2 MG/ML
4 INJECTION INTRAMUSCULAR; INTRAVENOUS ONCE AS NEEDED
Status: DISCONTINUED | OUTPATIENT
Start: 2024-10-31 | End: 2024-10-31 | Stop reason: HOSPADM

## 2024-10-31 RX ORDER — SODIUM CHLORIDE, SODIUM LACTATE, POTASSIUM CHLORIDE, CALCIUM CHLORIDE 600; 310; 30; 20 MG/100ML; MG/100ML; MG/100ML; MG/100ML
9 INJECTION, SOLUTION INTRAVENOUS CONTINUOUS
Status: ACTIVE | OUTPATIENT
Start: 2024-10-31 | End: 2024-11-01

## 2024-10-31 RX ORDER — SODIUM CHLORIDE 0.9 % (FLUSH) 0.9 %
3-10 SYRINGE (ML) INJECTION AS NEEDED
Status: DISCONTINUED | OUTPATIENT
Start: 2024-10-31 | End: 2024-10-31 | Stop reason: HOSPADM

## 2024-10-31 RX ORDER — LIDOCAINE HYDROCHLORIDE 10 MG/ML
0.5 INJECTION, SOLUTION INFILTRATION; PERINEURAL ONCE AS NEEDED
Status: DISCONTINUED | OUTPATIENT
Start: 2024-10-31 | End: 2024-10-31 | Stop reason: HOSPADM

## 2024-10-31 RX ORDER — EPHEDRINE SULFATE 50 MG/ML
5 INJECTION, SOLUTION INTRAVENOUS ONCE AS NEEDED
Status: DISCONTINUED | OUTPATIENT
Start: 2024-10-31 | End: 2024-10-31 | Stop reason: HOSPADM

## 2024-10-31 RX ORDER — ESCITALOPRAM OXALATE 20 MG/1
20 TABLET ORAL EVERY MORNING
Status: DISCONTINUED | OUTPATIENT
Start: 2024-11-01 | End: 2024-11-11 | Stop reason: HOSPADM

## 2024-10-31 RX ORDER — FLUMAZENIL 0.1 MG/ML
0.2 INJECTION INTRAVENOUS AS NEEDED
Status: DISCONTINUED | OUTPATIENT
Start: 2024-10-31 | End: 2024-10-31 | Stop reason: HOSPADM

## 2024-10-31 RX ORDER — POLYETHYLENE GLYCOL 3350 17 G/17G
17 POWDER, FOR SOLUTION ORAL DAILY PRN
Status: DISCONTINUED | OUTPATIENT
Start: 2024-10-31 | End: 2024-11-01

## 2024-10-31 RX ORDER — ONDANSETRON 4 MG/1
4 TABLET, ORALLY DISINTEGRATING ORAL EVERY 6 HOURS PRN
Status: DISCONTINUED | OUTPATIENT
Start: 2024-10-31 | End: 2024-11-11 | Stop reason: HOSPADM

## 2024-10-31 RX ORDER — ONDANSETRON 4 MG/1
4 TABLET, FILM COATED ORAL EVERY 8 HOURS PRN
Qty: 30 TABLET | Refills: 0 | Status: SHIPPED | OUTPATIENT
Start: 2024-10-31

## 2024-10-31 RX ORDER — DOCUSATE SODIUM 250 MG
250 CAPSULE ORAL 2 TIMES DAILY PRN
Qty: 30 CAPSULE | Refills: 0 | Status: SHIPPED | OUTPATIENT
Start: 2024-10-31

## 2024-10-31 RX ORDER — NALOXONE HCL 0.4 MG/ML
0.2 VIAL (ML) INJECTION AS NEEDED
Status: DISCONTINUED | OUTPATIENT
Start: 2024-10-31 | End: 2024-10-31 | Stop reason: HOSPADM

## 2024-10-31 RX ORDER — IPRATROPIUM BROMIDE AND ALBUTEROL SULFATE 2.5; .5 MG/3ML; MG/3ML
3 SOLUTION RESPIRATORY (INHALATION) ONCE AS NEEDED
Status: DISCONTINUED | OUTPATIENT
Start: 2024-10-31 | End: 2024-10-31 | Stop reason: HOSPADM

## 2024-10-31 RX ORDER — HYDRALAZINE HYDROCHLORIDE 20 MG/ML
5 INJECTION INTRAMUSCULAR; INTRAVENOUS
Status: DISCONTINUED | OUTPATIENT
Start: 2024-10-31 | End: 2024-10-31 | Stop reason: HOSPADM

## 2024-10-31 RX ORDER — LABETALOL HYDROCHLORIDE 5 MG/ML
5 INJECTION, SOLUTION INTRAVENOUS
Status: DISCONTINUED | OUTPATIENT
Start: 2024-10-31 | End: 2024-10-31 | Stop reason: HOSPADM

## 2024-10-31 RX ORDER — HYDROMORPHONE HYDROCHLORIDE 1 MG/ML
0.25 INJECTION, SOLUTION INTRAMUSCULAR; INTRAVENOUS; SUBCUTANEOUS
Status: DISCONTINUED | OUTPATIENT
Start: 2024-10-31 | End: 2024-10-31 | Stop reason: HOSPADM

## 2024-10-31 RX ORDER — DROPERIDOL 2.5 MG/ML
0.62 INJECTION, SOLUTION INTRAMUSCULAR; INTRAVENOUS
Status: DISCONTINUED | OUTPATIENT
Start: 2024-10-31 | End: 2024-10-31 | Stop reason: HOSPADM

## 2024-10-31 RX ORDER — BISACODYL 10 MG
10 SUPPOSITORY, RECTAL RECTAL DAILY PRN
Status: DISCONTINUED | OUTPATIENT
Start: 2024-10-31 | End: 2024-11-11 | Stop reason: HOSPADM

## 2024-10-31 RX ORDER — PROPOFOL 10 MG/ML
INJECTION, EMULSION INTRAVENOUS AS NEEDED
Status: DISCONTINUED | OUTPATIENT
Start: 2024-10-31 | End: 2024-10-31 | Stop reason: SURG

## 2024-10-31 RX ORDER — DIPHENHYDRAMINE HYDROCHLORIDE 50 MG/ML
12.5 INJECTION INTRAMUSCULAR; INTRAVENOUS
Status: DISCONTINUED | OUTPATIENT
Start: 2024-10-31 | End: 2024-10-31 | Stop reason: HOSPADM

## 2024-10-31 RX ORDER — ONDANSETRON 2 MG/ML
INJECTION INTRAMUSCULAR; INTRAVENOUS AS NEEDED
Status: DISCONTINUED | OUTPATIENT
Start: 2024-10-31 | End: 2024-10-31 | Stop reason: SURG

## 2024-10-31 RX ORDER — HYDROCODONE BITARTRATE AND ACETAMINOPHEN 5; 325 MG/1; MG/1
1 TABLET ORAL ONCE AS NEEDED
Status: DISCONTINUED | OUTPATIENT
Start: 2024-10-31 | End: 2024-10-31 | Stop reason: HOSPADM

## 2024-10-31 RX ADMIN — BUPIVACAINE HYDROCHLORIDE 25 ML: 2.5 INJECTION, SOLUTION EPIDURAL; INFILTRATION; INTRACAUDAL; PERINEURAL at 14:36

## 2024-10-31 RX ADMIN — SODIUM CHLORIDE 2000 MG: 900 INJECTION INTRAVENOUS at 15:50

## 2024-10-31 RX ADMIN — ROCURONIUM BROMIDE 40 MG: 10 INJECTION, SOLUTION INTRAVENOUS at 16:09

## 2024-10-31 RX ADMIN — LIDOCAINE HYDROCHLORIDE 50 MG: 20 INJECTION, SOLUTION INFILTRATION; PERINEURAL at 16:06

## 2024-10-31 RX ADMIN — MIDAZOLAM 0.5 MG: 1 INJECTION INTRAMUSCULAR; INTRAVENOUS at 14:47

## 2024-10-31 RX ADMIN — FENTANYL CITRATE 50 MCG: 50 INJECTION, SOLUTION INTRAMUSCULAR; INTRAVENOUS at 16:06

## 2024-10-31 RX ADMIN — PROPOFOL 50 MG: 10 INJECTION, EMULSION INTRAVENOUS at 16:06

## 2024-10-31 RX ADMIN — PROPOFOL 30 MG: 10 INJECTION, EMULSION INTRAVENOUS at 16:08

## 2024-10-31 RX ADMIN — SODIUM CHLORIDE, POTASSIUM CHLORIDE, SODIUM LACTATE AND CALCIUM CHLORIDE 9 ML/HR: 600; 310; 30; 20 INJECTION, SOLUTION INTRAVENOUS at 14:46

## 2024-10-31 RX ADMIN — ONDANSETRON 4 MG: 2 INJECTION INTRAMUSCULAR; INTRAVENOUS at 16:15

## 2024-10-31 RX ADMIN — DEXAMETHASONE SODIUM PHOSPHATE 4 MG: 4 INJECTION, SOLUTION INTRAMUSCULAR; INTRAVENOUS at 16:15

## 2024-10-31 RX ADMIN — FAMOTIDINE 20 MG: 10 INJECTION INTRAVENOUS at 14:46

## 2024-10-31 RX ADMIN — BUPIVACAINE 10 ML: 13.3 INJECTION, SUSPENSION, LIPOSOMAL INFILTRATION at 14:37

## 2024-10-31 NOTE — OP NOTE
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     Post-op Diagnosis:  Same       Procedure(s):  Intramedullary nail right humerus via the [employer] bone stabilization system, size 22/13 mm x 240 mm  CPT 98427     Surgeon(s):  Elmo Altman MD     Anesthesia:  General With regional     Staff:   Circulator: Yanet Alanis RN; Barb Dinh RN  Scrub Person: Maryann Altman; Ophelia Rivers    Estimated Blood Loss: minimal    Specimens:                Order Name Source Comment Collection Info Order Time   TISSUE PATHOLOGY EXAM Arm, Right  Collected By: Elmo Altman MD 10/31/2024  4:52 PM     Release to patient   Routine Release            Complications:  None     Implants:      Indications: Mrs. Madrid is a 84 y.o. female with recent diagnosis of metastatic adenocarcinoma and right arm pain.  She was found to have a pathologic fracture of the humerus for which I was consulted.  She was evaluated by me and elected to undergo the above procedure.  Risks of the procedure were discussed these included were not limited to bleeding, infection, damage to adjacent structures, DVT, PE, fracture, shoulder pain, failure of fixation, failure of the implant,, need for additional procedures. The patient understood the risks and elected to proceed.     Description of procedure:     Madelyn Madrid was seen and evaluated in preoperative holding area found to be neurovascularly intact the operative extremity was confirmed and marked as the operative extremity.  Consent was verified.    The patient  was transferred to the OR# [id number] at [facility].  Satisfactory anesthesia was induced and the patient was positioned on the operative table.  The operative extremity was preprepped with alcohol then prepped and draped normal sterile fashion with ChloraPrep.   Timeout was performed on agreement patient identity laterality procedure to be performed she received 2 g of Ancef prior to incision.     A roughly 1-1/2 cm incision was made along the anterolateral aspect of the proximal humerus blunt dissection was carried down to the bone.  An awl was positioned utilizing fluoroscopic assistance and advanced into the proximal humerus.  This was followed by placement of a guidewire into the proximal fragment.  It was then advanced under fluoroscopic views into the distal fragment.  The length was measured to be a 240 mm length.  I selected a 22/13 mm x 240mm implant for implantation.     Sequential reaming up to a size 8 was performed.  The cannula was advanced over the guidewire and the inner sleeve removed.  The implant was prepared according to technique on the back table and subsequently advanced into the cannula.  The cannula was peeled away from the implant and position confirmed on AP and lateral fluoroscopic views.  The implant was filled with monomer to provide good endosteal contact proximally and distally.  The stopcock was closed and the light source was activated for the appropriate length of time, as predetermined for the device.     After the time had  the hub was cut and the cannula scored at the base and removed without difficulty final fluoroscopic imaging was obtained.  The wounds were copiously irrigated with normal saline solution, followed by hydrogen peroxide and again by normal saline solution.     Closure was performed with 2-0 Vicryl, followed by 3-0 Monocryl in a subcuticular fashion.  Incision was sealed with Exofin and dressed with Aquacel.  The patient's arm was placed in the sling.     At end of the procedure all sponge needle counts were correct     I was present conduct the entirety of the procedure.      Elmo Altman M.D.  10/31/2024  [id number]

## 2024-10-31 NOTE — ANESTHESIA PROCEDURE NOTES
Airway  Urgency: elective    Date/Time: 10/31/2024 4:12 PM  Airway not difficult    General Information and Staff    Patient location during procedure: OR  Anesthesiologist: Nadia Aponte MD  CRNA/CAA: Darnell Ledezma CRNA    Indications and Patient Condition  Indications for airway management: airway protection    Preoxygenated: yes  MILS not maintained throughout  Mask difficulty assessment: 1 - vent by mask    Final Airway Details  Final airway type: endotracheal airway      Successful airway: ETT  Cuffed: yes   Successful intubation technique: direct laryngoscopy  Facilitating devices/methods: anterior pressure/BURP and Bougie  Endotracheal tube insertion site: oral  Blade: Jes  Blade size: 3  ETT size (mm): 6.5  Cormack-Lehane Classification: grade III - view of epiglottis only  Placement verified by: chest auscultation and capnometry   Cuff volume (mL): 10  Measured from: teeth  ETT/EBT  to teeth (cm): 22  Number of attempts at approach: 2  Assessment: lips, teeth, and gum same as pre-op and atraumatic intubation    Additional Comments  1st attempt with 7.0 ett, esophageal intubation,; 2nd attempt with bougie and 6.5 ett, successful

## 2024-10-31 NOTE — H&P
Patient Name:  Madelyn Madrid  YOB: 1940  MRN:  [id number]  Admit Date:  10/31/2024  Patient Care Team:  Therese Martinez MD as PCP - General (Family Medicine)  Urbano Ghosh MD as Consulting Physician (Hematology and Oncology)  Deann Iqbal MD as Surgeon (Thoracic Surgery)  Shelby Enrique, RN as Nurse Navigator  Treva Grullon MD as Consulting Physician (Cardiology)      Subjective   History Present Illness     No chief complaint on file.      Ms. Madrid is a 84 y.o. 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 Post operatively, she was confused and unable to weaned off of her oxygen, and so [facility] was asked to admit her to the hospital overnight.  When I came to see her in the PACU, she was on room air satting around 90%. She was disoriented and told me that we were at a police station. She said that she felt terrible, but couldn't elaborate. She wouldn't allow me to examine her, though I did manage to examine her lungs, heart, and do a skin survey. Her family came to bedside and they report no history of difficulty with anesthesia, hospital delirium, or cognitive impairment at baseline. Stat labs, an abg, and a chest xray were pending at the time of my evaluation.    History of Present Illness  Review of Systems   Reason unable to perform ROS: confusion, paranoia.        Personal History     Past Medical History:   Diagnosis Date    Arthritis     COPD (chronic obstructive pulmonary disease) 10/2/24    CT scan    Depression     Emphysema of lung 10_2_24    CT scan    Endometrial cancer     GERD (gastroesophageal reflux disease)     Goiter     [place] (hard of hearing)     Hx of radiation therapy     GOES 5 TIMES A WEEK    Hypertension     Incontinence of urine      Lung mass     Pancreatitis      Past Surgical History:   Procedure Laterality Date    BRONCHOSCOPY WITH ION ROBOTIC ASSIST N/A 10/23/2024    Procedure: ROBOTIC BRONCHOSCOPY WITH FINE NEEDLE ASPIRATION AND CRYO BIOPSY, BRONCHOSCOPY WITH BRONCHOALVEOLAR LAVAGE;  Surgeon: Deann Iqbal MD;  Location: [facility] ENDOSCOPY;  Service: Robotics - Pulmonary;  Laterality: N/A;    COLONOSCOPY      HYSTERECTOMY      LAPAROSCOPY HYSTEROSCOPY ENDOMETRIAL ABLATION       Family History   Problem Relation Age of Onset    Rectal cancer Mother     Alzheimer's disease Mother     Cancer Mother         colo-rectal    Cancer Father         Bone    Alzheimer's disease Sister     Alzheimer's disease Brother     Epilepsy Brother     Malig Hyperthermia Neg Hx      Social History     Tobacco Use    Smoking status: Former     Current packs/day: 0.00     Average packs/day: 1 pack/day for 40.0 years (40.0 ttl pk-yrs)     Types: Cigarettes     Start date: 1959     Quit date:      Years since quittin.8     Passive exposure: Past    Smokeless tobacco: Never   Vaping Use    Vaping status: Never Used   Substance Use Topics    Alcohol use: Not Currently    Drug use: Never     No current facility-administered medications on file prior to encounter.     Current Outpatient Medications on File Prior to Encounter   Medication Sig Dispense Refill    acetaminophen (Tylenol) 325 MG tablet Take 2 tablets by mouth Every 6 (Six) Hours As Needed for Mild Pain.      amLODIPine (NORVASC) 10 MG tablet Take 1 tablet by mouth Daily.      escitalopram (LEXAPRO) 20 MG tablet Take 1 tablet by mouth Every Morning.      OMEPRAZOLE PO Take 20 mg by mouth Daily As Needed.      traMADol (ULTRAM) 50 MG tablet Take 1 tablet by mouth Every 6 (Six) Hours As Needed for Moderate Pain. 120 tablet 0     Allergies   Allergen Reactions    Other Nausea And Vomiting and GI Intolerance     Opioids--       Objective    Objective     Vital Signs  Temp:  [98.2 °F (36.8 °C)-98.7  °F (37.1 °C)] 98.2 °F (36.8 °C)  Heart Rate:  [81-94] 87  Resp:  [16-20] 18  BP: (134-154)/(58-77) 134/58  SpO2:  [95 %-100 %] 97 %  on  Flow (L/min) (Oxygen Therapy):  [2-4] 4;   Device (Oxygen Therapy): nasal cannula  Body mass index is 19.75 kg/m².    Physical Exam  Vitals and nursing note reviewed.   Constitutional:       General: She is not in acute distress.     Appearance: She is not toxic-appearing.   HENT:      Head: Normocephalic and atraumatic.   Eyes:      Extraocular Movements: Extraocular movements intact.      Conjunctiva/sclera: Conjunctivae normal.      Pupils: Pupils are equal, round, and reactive to light.   Cardiovascular:      Rate and Rhythm: Normal rate and regular rhythm.      Heart sounds: Normal heart sounds.   Pulmonary:      Effort: Pulmonary effort is normal. No respiratory distress.      Breath sounds: Normal breath sounds. No wheezing, rhonchi or rales.   Musculoskeletal:      Right lower leg: No edema.      Left lower leg: No edema.   Skin:     General: Skin is warm and dry.      Findings: No erythema or rash.   Neurological:      General: No focal deficit present.      Mental Status: She is alert. She is disoriented.   Psychiatric:         Behavior: Behavior is uncooperative.         Thought Content: Thought content is paranoid.         Results Review:  I reviewed the patient's new clinical results.  I reviewed the patient's new imaging results and agree with the interpretation.  I reviewed the patient's other test results and agree with the interpretation  I personally viewed and interpreted the patient's EKG/Telemetry data  Discussed with ED provider.    Lab Results (last 24 hours)       ** No results found for the last 24 hours. **            Imaging Results (Last 24 Hours)       Procedure Component Value Units Date/Time    XR Humerus Right [[id number]] Collected: 10/31/24 1819     Updated: 10/31/24 1827    Narrative:      XR HUMERUS RIGHT-     INDICATIONS: Postoperative  evaluation.     TECHNIQUE: 3 views of the right humerus     COMPARISON: 10/31/2024 at 1607 hours     FINDINGS:     Postsurgical change of the right humerus is noted, with radiodense  marker extending across the pathologic fracture of the proximal humeral  shaft. Cortical step-off at the medial aspect of the fracture is  measured at 6 mm. Surgical soft tissue gas is evident.          Impression:         Postsurgical changes.           This report was finalized on 10/31/2024 6:24 PM by Dr. Wilmer Cosme M.D on Workstation: [id number]       FL C Arm During Surgery [[id number]] Resulted: 10/31/24 1723     Updated: 10/31/24 1723    Narrative:      This procedure was auto-finalized with no dictation required.            Results for orders placed during the hospital encounter of 09/16/24    Adult Transthoracic Echo Complete W/ Cont if Necessary Per Protocol    Interpretation Summary    Left ventricular ejection fraction appears to be 61 - 65%.    Estimated right ventricular systolic pressure from tricuspid regurgitation is mildly elevated (35-45 mmHg).    Indications  Dyspnea    Technically satisfactory study.  Mitral valve is structurally normal.  Tricuspid valve is structurally normal.  Aortic valve is structurally normal.  Pulmonic valve could not be well visualized.  Mild pulmonary hypertension is present.( 35 mmHg)  Left atrium is normal in size.  Right atrium is normal in size.  Left ventricle is normal in size and contractility with ejection fraction of 65%.  Left ventricular Global longitudinal LV strain (GLS) = 16.8%.  Concentric left ventricular hypertrophy is present.  Grade 1 LV dysfunction is present.  Right ventricle is normal in size and contractility with TAPSE of 2.5 cm..  Atrial septum is intact.  Aorta is normal.  No pericardial effusion or intracardiac thrombus is seen.    Impression  Structurally and functionally normal cardiac valves.  Mild pulmonary hypertension is present.( 35 mmHg).  Left  ventricle is normal in size and contractility with ejection fraction of 65%.  Left ventricular Global longitudinal LV strain (GLS) = 16.8%.  Concentric left ventricular hypertrophy is present.  Grade 1 LV dysfunction is present.  Right ventricle is normal in size and contractility with TAPSE of 2.5 cm..      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       Ms. Madrid is a 84 y.o. 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 Post operatively, she was confused and unable to weaned off of her oxygen, and so [facility] was asked to admit her to the hospital overnight.      Hypoxemia-minor. Likely related to anesthesia. She has been weaned to room air. Chest xray is negative. An abg is pending given her confusion and pulmonary emphysema on recent chest CT, but I'm not certain that she'll let us get it.  Encephalopathy-likely toxic related to anesthesia. Hold sedating medications for now.  Hypokalemia-replace orally  Hypertension-amlodipine  GERD-ppi  Adenocarcinoma of the lung with mets to the bone and a pathologic fracture of the right humerus-s/p elective right humeral intramedullary nail/darin insertion on 10/31/24  I discussed the patient's findings and my recommendations with patient, spouse, family, and nursing staff.    VTE Prophylaxis - SCDs.  Code Status - Full  code.       Deric Hendricks MD  [facility]  10/31/24  19:54 EDT

## 2024-10-31 NOTE — ANESTHESIA PROCEDURE NOTES
Peripheral Block      Patient reassessed immediately prior to procedure    Patient location during procedure: holding area  Reason for block: at surgeon's request and post-op pain management  Performed by  Anesthesiologist: Evelyn Nicolas MD  Preanesthetic Checklist  Completed: patient identified, IV checked, site marked, risks and benefits discussed, surgical consent, monitors and equipment checked, pre-op evaluation and timeout performed  Prep:  Pt Position: supine  Sterile barriers:gloves  Prep: ChloraPrep  Patient monitoring: continuous pulse oximetry, blood pressure monitoring and EKG  Procedure    Sedation: yes  Performed under: MAC  Guidance:ultrasound guided    ULTRASOUND INTERPRETATION.  Using ultrasound guidance a 20 G gauge needle was placed in close proximity to the brachial plexus nerve, at which point, under ultrasound guidance anesthetic was injected in the area of the nerve and spread of the anesthesia was seen on ultrasound in close proximity thereto.  There were no abnormalities seen on ultrasound; a digital image was taken; and the patient tolerated the procedure with no complications. Images:still images obtained, printed/placed on chart    Laterality:right  Block Type:interscalene  Injection Technique:single-shot  Needle Type:echogenic  Needle Gauge:20 G      Medications Used: bupivacaine liposome (EXPAREL) 1.3 % injection - Infiltration   10 mL - 10/31/2024 2:37:00 PM  bupivacaine PF (MARCAINE) 0.25 % injection - Injection   25 mL - 10/31/2024 2:36:00 PM      Medications  Comment:Ultrasound guidance was used to view and verify medication disbursement.  Ultrasound guidance was used for needle placement.    Post Assessment  Injection Assessment: negative aspiration for heme, no paresthesia on injection and incremental injection  Patient Tolerance:comfortable throughout block  Complications:no  Additional Notes  Ultrasound guidance was used to guide needle placement, as well as to view and verify  medication disbursement.   Performed by: Evelyn Nicolas MD

## 2024-10-31 NOTE — ANESTHESIA PREPROCEDURE EVALUATION
Anesthesia Evaluation     Patient summary reviewed and Nursing notes reviewed   NPO Solid Status: > 8 hours             Airway   Mallampati: II  TM distance: >3 FB  Neck ROM: full  No difficulty expected  Comment: Grade I view with Lorenz (blade not specified)  Dental - normal exam     Pulmonary - normal exam    breath sounds clear to auscultation  (+) a smoker Former, COPD,    ROS comment: Lung mass  Cardiovascular - normal exam    ECG reviewed  Rhythm: regular  Rate: normal    (+) hypertension less than 2 medications    ROS comment: EF 65% by ECHO 9/24/no ischemia by stress test 9/24    Neuro/Psych  (+) psychiatric history Depression    ROS Comment: [place]  GI/Hepatic/Renal/Endo    (+) GERD, thyroid problem     Musculoskeletal     Abdominal  - normal exam   Substance History      OB/GYN          Other   arthritis, blood dyscrasia anemia,   history of cancer      Other Comment: Hx endometrial CA/current metastatic disease to right arm bone and pelvis with unknown primary site/Hgb 11.1              Anesthesia Plan    ASA 3     general with block     intravenous induction     Anesthetic plan, risks, benefits, and alternatives have been provided, discussed and informed consent has been obtained with: patient.    CODE STATUS:

## 2024-10-31 NOTE — INTERVAL H&P NOTE
H&P reviewed. The patient was examined and there are no changes to the H&P.  Patient GEOVANY CORCORAN.

## 2024-10-31 NOTE — PROGRESS NOTES
"Chief Complaint  Lung Cancer (New patient Ref Dr. Ghosh, Lung cancer)    Subjective        Madelyn Madrid presents to [facility] THORACIC SURGERY  History of Present Illness  Ms. Madrid is a pleasant 84-year-old lady who presents with a right upper extremity fracture who was found to have multiple osseous metastasis as well as a lung mass.  She was referred here for biopsy.  She was in her usual state of health when she began to experience severe pain in her right upper extremity.    She is a former smoker with a greater than 40-pack-year history of tobacco use who quit in 1999.  She has a personal history of endometrial cancer.  She has a family history of rectal cancer in her mother and unknown cancer in her father.  Objective   Vital Signs:  /71 (BP Location: Left arm, Patient Position: Sitting, Cuff Size: Adult)   Pulse 82   Ht 157.5 cm (62\")   Wt 50.3 kg (111 lb)   SpO2 98%   BMI 20.30 kg/m²   Estimated body mass index is 20.3 kg/m² as calculated from the following:    Height as of this encounter: 157.5 cm (62\").    Weight as of this encounter: 50.3 kg (111 lb).    BMI is within normal parameters. No other follow-up for BMI required.      Physical Exam  Vitals and nursing note reviewed.   Constitutional:       Appearance: She is well-developed.   HENT:      Head: Normocephalic and atraumatic.      Nose: Nose normal.   Eyes:      Conjunctiva/sclera: Conjunctivae normal.   Cardiovascular:      Rate and Rhythm: Normal rate.   Pulmonary:      Effort: Pulmonary effort is normal.   Abdominal:      Palpations: Abdomen is soft.   Musculoskeletal:      Cervical back: Neck supple.   Skin:     General: Skin is warm and dry.   Neurological:      Mental Status: She is alert and oriented to person, place, and time.   Psychiatric:         Behavior: Behavior normal.         Thought Content: Thought content normal.         Judgment: Judgment normal.        Result Review :          I have " independently reviewed the CT of the chest performed on 10/2024 which demonstrates a 2.8 x 1.8 medial right lower lobe mass concerning for malignancy.  Borderline subcarinal and right hilar lymph nodes concerning for metastatic disease.  Multifocal osseous metastatic disease in the right humerus, thoracic lumbar and sacral spine, right ilium.     Assessment and Plan   Diagnoses and all orders for this visit:    1. Lung mass (Primary)  -     NM PET/CT Skull Base to Mid Thigh; Future  -     CT Chest Without Contrast; Future  -     MRI Brain With & Without Contrast; Future  -     Ambulatory Referral to Radiation Oncology  -     Case Request; Standing  -     Case Request    Other orders  -     Cancel: Follow Anesthesia Guidlines / Standing Orders; Standing  -     Follow Anesthesia Guidelines / Protocol; Future    Ms. Madrid is a pleasant 84-year-old lady with what appears to be metastatic lung cancer.  We had a long discussion today regarding need for staging with a PET CT scan as well as a brain MRI.  I would also recommend a bronchoscopy with biopsy of the right lower lobe mass as I think this will be the easiest way to obtain a diagnosis.  We will schedule her for a CT scan to facilitate this.       I spent 45 minutes caring for Madelyn on this date of service. This time includes time spent by me in the following activities:preparing for the visit, reviewing tests, obtaining and/or reviewing a separately obtained history, performing a medically appropriate examination and/or evaluation , counseling and educating the patient/family/caregiver, ordering medications, tests, or procedures, documenting information in the medical record, and independently interpreting results and communicating that information with the patient/family/caregiver  Follow Up   No follow-ups on file.  Patient was given instructions and counseling regarding her condition or for health maintenance advice. Please see specific information pulled  into the AVS if appropriate.

## 2024-11-01 DIAGNOSIS — C79.51 MALIGNANT NEOPLASM METASTATIC TO PELVIC BONE WITH UNKNOWN PRIMARY SITE: Primary | ICD-10-CM

## 2024-11-01 DIAGNOSIS — R91.8 LUNG MASS: ICD-10-CM

## 2024-11-01 DIAGNOSIS — C79.51: ICD-10-CM

## 2024-11-01 DIAGNOSIS — C80.1: ICD-10-CM

## 2024-11-01 DIAGNOSIS — C80.1 MALIGNANT NEOPLASM METASTATIC TO PELVIC BONE WITH UNKNOWN PRIMARY SITE: Primary | ICD-10-CM

## 2024-11-01 LAB
ANION GAP SERPL CALCULATED.3IONS-SCNC: 14 MMOL/L (ref 5–15)
ANION GAP SERPL CALCULATED.3IONS-SCNC: 16.9 MMOL/L (ref 5–15)
ARTERIAL PATENCY WRIST A: POSITIVE
ATMOSPHERIC PRESS: 749.8 MMHG
BASE EXCESS BLDA CALC-SCNC: -1.4 MMOL/L (ref 0–2)
BDY SITE: ABNORMAL
BUN SERPL-MCNC: 17 MG/DL (ref 8–23)
BUN SERPL-MCNC: 20 MG/DL (ref 8–23)
BUN/CREAT SERPL: 18.5 (ref 7–25)
BUN/CREAT SERPL: 20.2 (ref 7–25)
CALCIUM SPEC-SCNC: 8.6 MG/DL (ref 8.6–10.5)
CALCIUM SPEC-SCNC: 9.2 MG/DL (ref 8.6–10.5)
CHLORIDE SERPL-SCNC: 97 MMOL/L (ref 98–107)
CHLORIDE SERPL-SCNC: 97 MMOL/L (ref 98–107)
CO2 BLDA-SCNC: 24.6 MMOL/L (ref 23–27)
CO2 SERPL-SCNC: 20 MMOL/L (ref 22–29)
CO2 SERPL-SCNC: 21.1 MMOL/L (ref 22–29)
CREAT SERPL-MCNC: 0.92 MG/DL (ref 0.57–1)
CREAT SERPL-MCNC: 0.99 MG/DL (ref 0.57–1)
DEPRECATED RDW RBC AUTO: 39.2 FL (ref 37–54)
EGFRCR SERPLBLD CKD-EPI 2021: 57 ML/MIN/1.73
EGFRCR SERPLBLD CKD-EPI 2021: 62.3 ML/MIN/1.73
ERYTHROCYTE [DISTWIDTH] IN BLOOD BY AUTOMATED COUNT: 13.6 % (ref 12.3–15.4)
GAS FLOW AIRWAY: 2 LPM
GLUCOSE SERPL-MCNC: 156 MG/DL (ref 65–99)
GLUCOSE SERPL-MCNC: 157 MG/DL (ref 65–99)
HCO3 BLDA-SCNC: 23.4 MMOL/L (ref 22–28)
HCT VFR BLD AUTO: 44 % (ref 34–46.6)
HEMODILUTION: NO
HGB BLD-MCNC: 14.4 G/DL (ref 12–15.9)
MAGNESIUM SERPL-MCNC: 1.9 MG/DL (ref 1.6–2.4)
MCH RBC QN AUTO: 26.6 PG (ref 26.6–33)
MCHC RBC AUTO-ENTMCNC: 32.7 G/DL (ref 31.5–35.7)
MCV RBC AUTO: 81.3 FL (ref 79–97)
MODALITY: ABNORMAL
PCO2 BLDA: 38.5 MM HG (ref 35–45)
PH BLDA: 7.39 PH UNITS (ref 7.35–7.45)
PHOSPHATE SERPL-MCNC: 4 MG/DL (ref 2.5–4.5)
PLATELET # BLD AUTO: 417 10*3/MM3 (ref 140–450)
PMV BLD AUTO: 8.9 FL (ref 6–12)
PO2 BLDA: 82.4 MM HG (ref 80–100)
POTASSIUM SERPL-SCNC: 4.8 MMOL/L (ref 3.5–5.2)
POTASSIUM SERPL-SCNC: 4.9 MMOL/L (ref 3.5–5.2)
RBC # BLD AUTO: 5.41 10*6/MM3 (ref 3.77–5.28)
SAO2 % BLDCOA: 96 % (ref 92–98.5)
SET MECH RESP RATE: 20
SODIUM SERPL-SCNC: 131 MMOL/L (ref 136–145)
SODIUM SERPL-SCNC: 135 MMOL/L (ref 136–145)
WBC NRBC COR # BLD AUTO: 10.9 10*3/MM3 (ref 3.4–10.8)

## 2024-11-01 PROCEDURE — G0378 HOSPITAL OBSERVATION PER HR: HCPCS

## 2024-11-01 PROCEDURE — 25010000002 CEFAZOLIN PER 500 MG: Performed by: ORTHOPAEDIC SURGERY

## 2024-11-01 PROCEDURE — 83735 ASSAY OF MAGNESIUM: CPT | Performed by: STUDENT IN AN ORGANIZED HEALTH CARE EDUCATION/TRAINING PROGRAM

## 2024-11-01 PROCEDURE — 80048 BASIC METABOLIC PNL TOTAL CA: CPT | Performed by: STUDENT IN AN ORGANIZED HEALTH CARE EDUCATION/TRAINING PROGRAM

## 2024-11-01 PROCEDURE — 82803 BLOOD GASES ANY COMBINATION: CPT

## 2024-11-01 PROCEDURE — 80048 BASIC METABOLIC PNL TOTAL CA: CPT

## 2024-11-01 PROCEDURE — 25010000002 ENOXAPARIN PER 10 MG: Performed by: STUDENT IN AN ORGANIZED HEALTH CARE EDUCATION/TRAINING PROGRAM

## 2024-11-01 PROCEDURE — 85027 COMPLETE CBC AUTOMATED: CPT

## 2024-11-01 PROCEDURE — 25810000003 SODIUM CHLORIDE 0.9 % SOLUTION: Performed by: STUDENT IN AN ORGANIZED HEALTH CARE EDUCATION/TRAINING PROGRAM

## 2024-11-01 PROCEDURE — 25010000002 ONDANSETRON PER 1 MG

## 2024-11-01 PROCEDURE — 84100 ASSAY OF PHOSPHORUS: CPT | Performed by: STUDENT IN AN ORGANIZED HEALTH CARE EDUCATION/TRAINING PROGRAM

## 2024-11-01 PROCEDURE — 36600 WITHDRAWAL OF ARTERIAL BLOOD: CPT

## 2024-11-01 RX ORDER — POLYETHYLENE GLYCOL 3350 17 G/17G
17 POWDER, FOR SOLUTION ORAL DAILY
Status: DISCONTINUED | OUTPATIENT
Start: 2024-11-01 | End: 2024-11-02

## 2024-11-01 RX ORDER — AMOXICILLIN 250 MG
2 CAPSULE ORAL 2 TIMES DAILY
Status: DISCONTINUED | OUTPATIENT
Start: 2024-11-01 | End: 2024-11-11 | Stop reason: HOSPADM

## 2024-11-01 RX ORDER — POTASSIUM CHLORIDE 750 MG/1
40 TABLET, FILM COATED, EXTENDED RELEASE ORAL EVERY 4 HOURS
Status: DISCONTINUED | OUTPATIENT
Start: 2024-11-01 | End: 2024-11-01

## 2024-11-01 RX ORDER — TRAMADOL HYDROCHLORIDE 50 MG/1
25 TABLET ORAL ONCE
Status: COMPLETED | OUTPATIENT
Start: 2024-11-01 | End: 2024-11-01

## 2024-11-01 RX ORDER — ENOXAPARIN SODIUM 100 MG/ML
40 INJECTION SUBCUTANEOUS NIGHTLY
Status: DISCONTINUED | OUTPATIENT
Start: 2024-11-01 | End: 2024-11-09

## 2024-11-01 RX ORDER — OXYCODONE HYDROCHLORIDE 5 MG/1
2.5 TABLET ORAL EVERY 6 HOURS PRN
Status: DISCONTINUED | OUTPATIENT
Start: 2024-11-01 | End: 2024-11-02

## 2024-11-01 RX ORDER — OXYCODONE HYDROCHLORIDE 5 MG/1
5 TABLET ORAL EVERY 6 HOURS PRN
Status: DISCONTINUED | OUTPATIENT
Start: 2024-11-01 | End: 2024-11-01

## 2024-11-01 RX ORDER — ACETAMINOPHEN 500 MG
1000 TABLET ORAL EVERY 8 HOURS
Status: DISCONTINUED | OUTPATIENT
Start: 2024-11-01 | End: 2024-11-11 | Stop reason: HOSPADM

## 2024-11-01 RX ORDER — SODIUM CHLORIDE 9 MG/ML
75 INJECTION, SOLUTION INTRAVENOUS CONTINUOUS
Status: ACTIVE | OUTPATIENT
Start: 2024-11-01 | End: 2024-11-02

## 2024-11-01 RX ORDER — ACETAMINOPHEN 500 MG
1000 TABLET ORAL ONCE
Status: COMPLETED | OUTPATIENT
Start: 2024-11-01 | End: 2024-11-01

## 2024-11-01 RX ORDER — SODIUM CHLORIDE 9 MG/ML
250 INJECTION, SOLUTION INTRAVENOUS CONTINUOUS
Status: ACTIVE | OUTPATIENT
Start: 2024-11-01 | End: 2024-11-01

## 2024-11-01 RX ADMIN — OXYCODONE HYDROCHLORIDE 2.5 MG: 5 TABLET ORAL at 23:32

## 2024-11-01 RX ADMIN — SODIUM CHLORIDE 2000 MG: 900 INJECTION INTRAVENOUS at 02:55

## 2024-11-01 RX ADMIN — SODIUM CHLORIDE 250 ML/HR: 9 INJECTION, SOLUTION INTRAVENOUS at 12:22

## 2024-11-01 RX ADMIN — OXYCODONE HYDROCHLORIDE 5 MG: 5 TABLET ORAL at 17:28

## 2024-11-01 RX ADMIN — SODIUM CHLORIDE 2000 MG: 900 INJECTION INTRAVENOUS at 08:29

## 2024-11-01 RX ADMIN — TRAMADOL HYDROCHLORIDE 25 MG: 50 TABLET ORAL at 12:22

## 2024-11-01 RX ADMIN — SENNOSIDES AND DOCUSATE SODIUM 2 TABLET: 50; 8.6 TABLET ORAL at 20:53

## 2024-11-01 RX ADMIN — POTASSIUM CHLORIDE 40 MEQ: 750 TABLET, EXTENDED RELEASE ORAL at 04:25

## 2024-11-01 RX ADMIN — ONDANSETRON 4 MG: 2 INJECTION INTRAMUSCULAR; INTRAVENOUS at 08:29

## 2024-11-01 RX ADMIN — AMLODIPINE BESYLATE 10 MG: 10 TABLET ORAL at 09:46

## 2024-11-01 RX ADMIN — POTASSIUM CHLORIDE 40 MEQ: 750 TABLET, EXTENDED RELEASE ORAL at 00:06

## 2024-11-01 RX ADMIN — ENOXAPARIN SODIUM 40 MG: 100 INJECTION SUBCUTANEOUS at 20:53

## 2024-11-01 RX ADMIN — PANTOPRAZOLE SODIUM 40 MG: 40 TABLET, DELAYED RELEASE ORAL at 06:23

## 2024-11-01 RX ADMIN — ACETAMINOPHEN 1000 MG: 500 TABLET ORAL at 12:22

## 2024-11-01 RX ADMIN — ONDANSETRON 4 MG: 2 INJECTION INTRAMUSCULAR; INTRAVENOUS at 20:49

## 2024-11-01 RX ADMIN — SODIUM CHLORIDE 75 ML/HR: 9 INJECTION, SOLUTION INTRAVENOUS at 19:01

## 2024-11-01 RX ADMIN — ESCITALOPRAM 20 MG: 20 TABLET, FILM COATED ORAL at 06:23

## 2024-11-01 RX ADMIN — ACETAMINOPHEN 1000 MG: 500 TABLET ORAL at 20:53

## 2024-11-01 NOTE — PROGRESS NOTES
"                                                                  Orthopaedic/Musculoskeletal Oncologic  Progress Note      Patient Identification:  Name: Madelyn Madrid  Age: 84 y.o.  Sex: female  :  1940  MRN: [id number]                    Subjective:   NILESH.  Pain well controlled.  Confused overnight    Current Meds:   Scheduled Meds:amLODIPine, 10 mg, Oral, Daily  ceFAZolin, 2,000 mg, Intravenous, Q8H  escitalopram, 20 mg, Oral, QAM  pantoprazole, 40 mg, Oral, Q AM      Continuous Infusions:lactated ringers, 9 mL/hr, Last Rate: 9 mL/hr (10/31/24 1558)      PRN Meds:.  senna-docusate sodium **AND** polyethylene glycol **AND** bisacodyl **AND** bisacodyl    ipratropium-albuterol    ondansetron ODT **OR** ondansetron    Potassium Replacement - Follow Nurse / BPA Driven Protocol    Objective:  tMax 24 hrs: Temp (24hrs), Av.4 °F (36.9 °C), Min:97.9 °F (36.6 °C), Max:98.7 °F (37.1 °C)    Vitals Ranges:   Temp:  [97.9 °F (36.6 °C)-98.7 °F (37.1 °C)] 97.9 °F (36.6 °C)  Heart Rate:  [] 97  Resp:  [16-20] 18  BP: (128-154)/(58-77) 138/67  Intake and Output Last 3 Shifts:   No intake/output data recorded.    Physical Exam:  /67 (BP Location: Left arm, Patient Position: Lying)   Pulse 97   Temp 97.9 °F (36.6 °C) (Oral)   Resp 18   Ht 157.5 cm (62\")   LMP  (LMP Unknown)   SpO2 95%   BMI 19.75 kg/m²     General Appearance:    Awake, cooperative, no distress, appears stated age   HEENT:    Normocephalic, atraumatic. Sclerae anicteric.  Mucous         membranes moist.   Cardio:   Regular rate and rhythm.   Lungs:     Breathing unlabored on room air   Focused MSK:   RUE  dressiing intact, 2+ radial pulse.  SILT Rad, med, ulnar.  AIN PIN intact.           Data Review:    Admission on 10/31/2024   Component Date Value Ref Range Status    Glucose 10/31/2024 146 (H)  65 - 99 mg/dL Final    BUN 10/31/2024 10  8 - 23 mg/dL Final    Creatinine 10/31/2024 0.78  0.57 - 1.00 mg/dL Final    Sodium 10/31/2024 " 135 (L)  136 - 145 mmol/L Final    Potassium 10/31/2024 3.3 (L)  3.5 - 5.2 mmol/L Final    Chloride 10/31/2024 95 (L)  98 - 107 mmol/L Final    CO2 10/31/2024 23.6  22.0 - 29.0 mmol/L Final    Calcium 10/31/2024 9.0  8.6 - 10.5 mg/dL Final    Total Protein 10/31/2024 6.9  6.0 - 8.5 g/dL Final    Albumin 10/31/2024 3.5  3.5 - 5.2 g/dL Final    ALT (SGPT) 10/31/2024 11  1 - 33 U/L Final    AST (SGOT) 10/31/2024 47 (H)  1 - 32 U/L Final    Alkaline Phosphatase 10/31/2024 102  39 - 117 U/L Final    Total Bilirubin 10/31/2024 0.4  0.0 - 1.2 mg/dL Final    Globulin 10/31/2024 3.4  gm/dL Final    A/G Ratio 10/31/2024 1.0  g/dL Final    BUN/Creatinine Ratio 10/31/2024 12.8  7.0 - 25.0 Final    Anion Gap 10/31/2024 16.4 (H)  5.0 - 15.0 mmol/L Final    eGFR 10/31/2024 75.9  >60.0 mL/min/1.73 Final    WBC 10/31/2024 11.64 (H)  3.40 - 10.80 10*3/mm3 Final    RBC 10/31/2024 4.78  3.77 - 5.28 10*6/mm3 Final    Hemoglobin 10/31/2024 12.3  12.0 - 15.9 g/dL Final    Hematocrit 10/31/2024 38.5  34.0 - 46.6 % Final    MCV 10/31/2024 80.5  79.0 - 97.0 fL Final    MCH 10/31/2024 25.7 (L)  26.6 - 33.0 pg Final    MCHC 10/31/2024 31.9  31.5 - 35.7 g/dL Final    RDW 10/31/2024 13.1  12.3 - 15.4 % Final    RDW-SD 10/31/2024 38.0  37.0 - 54.0 fl Final    MPV 10/31/2024 9.1  6.0 - 12.0 fL Final    Platelets 10/31/2024 382  140 - 450 10*3/mm3 Final    Neutrophil % 10/31/2024 92.6 (H)  42.7 - 76.0 % Final    Lymphocyte % 10/31/2024 3.3 (L)  19.6 - 45.3 % Final    Monocyte % 10/31/2024 3.2 (L)  5.0 - 12.0 % Final    Eosinophil % 10/31/2024 0.0 (L)  0.3 - 6.2 % Final    Basophil % 10/31/2024 0.1  0.0 - 1.5 % Final    Immature Grans % 10/31/2024 0.8 (H)  0.0 - 0.5 % Final    Neutrophils, Absolute 10/31/2024 10.79 (H)  1.70 - 7.00 10*3/mm3 Final    Lymphocytes, Absolute 10/31/2024 0.38 (L)  0.70 - 3.10 10*3/mm3 Final    Monocytes, Absolute 10/31/2024 0.37  0.10 - 0.90 10*3/mm3 Final    Eosinophils, Absolute 10/31/2024 0.00  0.00 - 0.40 10*3/mm3  Final    Basophils, Absolute 10/31/2024 0.01  0.00 - 0.20 10*3/mm3 Final    Immature Grans, Absolute 10/31/2024 0.09 (H)  0.00 - 0.05 10*3/mm3 Final    nRBC 10/31/2024 0.0  0.0 - 0.2 /100 WBC Final    Site 11/01/2024 Left Radial   Final    Dg's Test 11/01/2024 Positive   Final    pH, Arterial 11/01/2024 7.391  7.350 - 7.450 pH units Final    pCO2, Arterial 11/01/2024 38.5  35.0 - 45.0 mm Hg Final    pO2, Arterial 11/01/2024 82.4  80.0 - 100.0 mm Hg Final    HCO3, Arterial 11/01/2024 23.4  22.0 - 28.0 mmol/L Final    Base Excess, Arterial 11/01/2024 -1.4 (L)  0.0 - 2.0 mmol/L Final    Serial Number: [id number]:  [id number]    O2 Saturation, Arterial 11/01/2024 96.0  92.0 - 98.5 % Final    CO2 Content 11/01/2024 24.6  23 - 27 mmol/L Final    Barometric Pressure for Blood Gas 11/01/2024 749.8000  mmHg Final    Modality 11/01/2024 Cannula   Final    Flow Rate 11/01/2024 2.0000  lpm Final    Set Mech Resp Rate 11/01/2024 20   Final    Hemodilution 11/01/2024 No   Final   Hospital Outpatient Visit on 10/31/2024   Component Date Value Ref Range Status    Course ID 10/31/2024 C1   Final    Course Start Date 10/31/2024 10/21/2024  3:45 PM   Final    Course First Treatment Date 10/31/2024 10/24/2024  1:52 PM   Final    Course Last Treatment Date 10/31/2024 10/31/2024 12:09 PM   Final    Course Elapsed Days 10/31/2024 7   Final    Reference Point ID 10/31/2024 Pelvis   Final    Reference Point Dosage Given to Da* 10/31/2024 18  Gy Final    Reference Point Session Dosage Giv* 10/31/2024 3  Gy Final    Plan ID 10/31/2024 L3   Final    Plan Fractions Treated to Date 10/31/2024 6   Final    Plan Total Fractions Prescribed 10/31/2024 10   Final    Plan Prescribed Dose Per Fraction 10/31/2024 3  Gy Final    Plan Total Prescribed Dose 10/31/2024 3,000  cGy Final    Plan Primary Reference Point 10/31/2024 Pelvis   Final       No results found.    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 female POD 1 s/p R humerus IMN.  Doing well.  Pain well controlled.  Oriented to person, place and situation..      Medical decision-making:  Continue pain regimen  Abx completed today  TSERING CORCORAN, vane for comfort  OK for discharge home today from my standpoint.    Elmo Altman MD  11/1/2024

## 2024-11-01 NOTE — PROGRESS NOTES
Name: Madelyn Madrid ADMIT: 10/31/2024   : 1940  PCP: Therese Martinez MD    MRN: [id number] LOS: 0 days   AGE/SEX: 84 y.o. female  ROOM: [facility]     Subjective   Subjective   Patient seen this morning, spouse at bedside.  Patient still drowsy but arousable, oriented x 3.  Mentation overall improved significantly compared to yesterday per spouse.  Complains of right upper extremity pain.    Review of Systems   As above  Objective   Objective   Vital Signs  Temp:  [97.7 °F (36.5 °C)-98.7 °F (37.1 °C)] 97.7 °F (36.5 °C)  Heart Rate:  [] 101  Resp:  [16-20] 17  BP: (126-154)/(58-80) 126/75  SpO2:  [86 %-98 %] 96 %  on  Flow (L/min) (Oxygen Therapy):  [1-4] 1;   Device (Oxygen Therapy): room air  Body mass index is 19.75 kg/m².  Physical Exam    General: Laying in bed, drowsy, not in distress,  HEENT: Normocephalic, atraumatic  CV: Regular rate and rhythm, no murmurs rubs or gallops  Lungs: CTA anteriorly, no wheezing  Abdomen: Soft, nontender, nondistended  Extremities: No significant peripheral edema , right upper extremity in sling    Results Review     I reviewed the patient's new clinical results.  Results from last 7 days   Lab Units 24  0835 10/31/24  2017 10/28/24  1351   WBC 10*3/mm3 10.90* 11.64* 8.36   HEMOGLOBIN g/dL 14.4 12.3 11.1*   PLATELETS 10*3/mm3 417 382 338     Results from last 7 days   Lab Units 24  1159 24  0835 10/31/24  2017 10/28/24  1351   SODIUM mmol/L 131* 135* 135* 134*   POTASSIUM mmol/L 4.8 4.9 3.3* 3.2*   CHLORIDE mmol/L 97* 97* 95* 92*   CO2 mmol/L 20.0* 21.1* 23.6 33.2*   BUN mg/dL 20 17 10 13   CREATININE mg/dL 0.99 0.92 0.78 0.95   GLUCOSE mg/dL 157* 156* 146* 134*   Estimated Creatinine Clearance: 33.9 mL/min (by C-G formula based on SCr of 0.99 mg/dL).  Results from last 7 days   Lab Units 10/31/24  2017   ALBUMIN g/dL 3.5   BILIRUBIN mg/dL 0.4   ALK PHOS U/L 102   AST (SGOT) U/L 47*   ALT (SGPT) U/L 11     Results from last 7 days   Lab Units  "11/01/24  1159 11/01/24  0835 10/31/24  2017 10/28/24  1351   CALCIUM mg/dL 8.6 9.2 9.0 9.2   ALBUMIN g/dL  --   --  3.5  --    MAGNESIUM mg/dL  --  1.9  --   --    PHOSPHORUS mg/dL  --  4.0  --   --      No results found for: \"COVID19\"  No results found for: \"HGBA1C\", \"POCGLU\"        XR Chest 1 View  Narrative: XR CHEST 1 VW-     HISTORY: Female who is 84 years-old, hypoxia     TECHNIQUE: Frontal view of the chest     COMPARISON: 10/23/2024     FINDINGS: The heart is enlarged. Pulmonary vasculature is unremarkable.  Aorta is tortuous, calcified. Previous CT demonstrated medial right  lower lobe pulmonary mass is not well characterized on this exam. No  acute infiltrate, pleural effusion, or pneumothorax. Right hemidiaphragm  is elevated. Surgical change of the right humerus is noted, at the level  of previously demonstrated pathologic fracture.     Impression: No acute infiltrate. Cardiomegaly. Tortuous aorta. Follow-up  as clinically indicated.     This report was finalized on 10/31/2024 8:57 PM by Dr. Wilmer Cosme M.D on Workstation: [id number]     XR Humerus Right  Narrative: XR HUMERUS RIGHT-     INDICATIONS: Postoperative evaluation.     TECHNIQUE: 3 views of the right humerus     COMPARISON: 10/31/2024 at 1607 hours     FINDINGS:     Postsurgical change of the right humerus is noted, with radiodense  marker extending across the pathologic fracture of the proximal humeral  shaft. Cortical step-off at the medial aspect of the fracture is  measured at 6 mm. Surgical soft tissue gas is evident.        Impression:    Postsurgical changes.           This report was finalized on 10/31/2024 6:24 PM by Dr. Wilmer Cosme M.D on Workstation: [id number]     FL C Arm During Surgery  This procedure was auto-finalized with no dictation required.  Peripheral Block  Evelyn Nicolas MD     10/31/2024  3:32 PM  Peripheral Block    Patient reassessed immediately prior to procedure    Patient location during procedure: " holding area  Reason for block: at surgeon's request and post-op pain management  Performed by  Anesthesiologist: Evelyn Nicolas MD  Preanesthetic Checklist  Completed: patient identified, IV checked, site marked, risks and benefits   discussed, surgical consent, monitors and equipment checked, pre-op   evaluation and timeout performed  Prep:  Pt Position: supine  Sterile barriers:gloves  Prep: ChloraPrep  Patient monitoring: continuous pulse oximetry, blood pressure monitoring   and EKG  Procedure    Sedation: yes  Performed under: MAC  Guidance:ultrasound guided    ULTRASOUND INTERPRETATION.  Using ultrasound guidance a 20 G gauge needle   was placed in close proximity to the brachial plexus nerve, at which   point, under ultrasound guidance anesthetic was injected in the area of   the nerve and spread of the anesthesia was seen on ultrasound in close   proximity thereto.  There were no abnormalities seen on ultrasound; a   digital image was taken; and the patient tolerated the procedure with no   complications. Images:still images obtained, printed/placed on chart    Laterality:right  Block Type:interscalene  Injection Technique:single-shot  Needle Type:echogenic  Needle Gauge:20 G    Medications Used: bupivacaine liposome (EXPAREL) 1.3 % injection -   Infiltration   10 mL - 10/31/2024 2:37:00 PM  bupivacaine PF (MARCAINE) 0.25 % injection - Injection   25 mL - 10/31/2024 2:36:00 PM    Medications  Comment:Ultrasound guidance was used to view and verify medication   disbursement.  Ultrasound guidance was used for needle placement.    Post Assessment  Injection Assessment: negative aspiration for heme, no paresthesia on   injection and incremental injection  Patient Tolerance:comfortable throughout block  Complications:no  Additional Notes  Ultrasound guidance was used to guide needle placement, as well as to view   and verify medication disbursement.   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     Ms. Madrid is a 84 y.o. 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 Post operatively, she was confused and unable to weaned off of her oxygen, and so [facility] was asked to admit her to the hospital overnight.       Hypoxemia-  -minor. Likely related to anesthesia.   -she has been weaned to room air.  - Chest xray is negative.  ABG unremarkable.      Encephalopathy-  -likely toxic related to anesthesia.  Improved.      Hypokalemia-  WNL    Hypertension  BP stable-amlodipine    GERD-ppi      Adenocarcinoma of the lung with mets to the bone and a pathologic fracture of the right humerus  -s/p elective right humeral intramedullary nail/darin insertion on 10/31/24  -Schedule high-dose Tylenol for pain, pain and oxycodone 5 mg    Hyponatremia  -Sodium 131.  Suspect due to poor p.o.  intake  -IV fluid bolus ordered  -Monitor          SCDs for DVT prophylaxis.  Full code.  Discussed with patient and nursing staff.  Home, likely 1-2 days  Expected Discharge Date: 10/31/2024; Expected Discharge Time:        Copied text in this note has been reviewed and is accurate as of 11/01/24.         Dictated utilizing Dragon dictation        Milse Dietrich MD  [facility]  11/01/24  15:49 EDT

## 2024-11-01 NOTE — SIGNIFICANT NOTE
11/01/24 1154   OTHER   Discipline physical therapist   Rehab Time/Intention   Session Not Performed other (see comments)  (RN hold as pt lethargic and very N/V. PT may follow up later this afternoon, time permitting, or next service date for eval as appropriate.)   Therapy Assessment/Plan (PT)   Criteria for Skilled Interventions Met (PT) yes   Recommendation   PT - Next Appointment 11/02/24       Attempted again around 1315 w/ pt just getting back into bed d/t being uncomfortable and in pain while in the chair.

## 2024-11-01 NOTE — PLAN OF CARE
Goal Outcome Evaluation:           Progress: no change  Outcome Evaluation: Pt is POD#1 of right humerus IM nail. Some confusion. voiding per opal. BM today. Up to chair today. Assist of 2. IV bolus given. Plan is to D/C home with family when stable

## 2024-11-01 NOTE — PROGRESS NOTES
Continued Stay Note  [facility]     Patient Name: Madelyn Madrid  MRN: [id number]  Today's Date: 11/1/2024    Admit Date: 10/31/2024        Discharge Plan       Row Name 11/01/24 1413       Plan    Final Discharge Disposition Code 01 - home or self-care    Final Note D/c home                   Discharge Codes    No documentation.                 Expected Discharge Date and Time       Expected Discharge Date Expected Discharge Time    Oct 31, 2024               Katty Garcia RN

## 2024-11-01 NOTE — PROGRESS NOTES
"[facility] Clinical Pharmacy Services: Enoxaparin Consult    Madelyn Madrid has a pharmacy consult to dose prophylactic enoxaparin per Dr. Dietrich's request.     Indication: VTE Prophylaxis  Home Anticoagulation: none     Relevant clinical data and objective history reviewed:  84 y.o. female 157.5 cm (62\")     Body mass index is 19.75 kg/m².   Results from last 7 days   Lab Units 11/01/24  0835   PLATELETS 10*3/mm3 417     Estimated Creatinine Clearance: 33.9 mL/min (by C-G formula based on SCr of 0.99 mg/dL).    Assessment/Plan    Will start patient on 40mg subcutaneous every 24 hours, adjusted for renal function. Consult order will be discontinued but pharmacy will continue to follow.     Guerline Dodge, [facility]  Clinical Pharmacist    "

## 2024-11-01 NOTE — NURSING NOTE
Hospitalist here and able to speak to the pts family orders noted labs collected await abg and chest xray results

## 2024-11-01 NOTE — PLAN OF CARE
Goal Outcome Evaluation:  Plan of Care Reviewed With: patient           Outcome Evaluation: Patient is POD1 R humerus IM nail. Confused after surgery. Voiding per purewick. IV abx given per order. Dressing cdi. 2L O2. Plans to d/c pending.

## 2024-11-02 LAB
ALBUMIN SERPL-MCNC: 2.7 G/DL (ref 3.5–5.2)
ANION GAP SERPL CALCULATED.3IONS-SCNC: 11 MMOL/L (ref 5–15)
BUN SERPL-MCNC: 31 MG/DL (ref 8–23)
BUN/CREAT SERPL: 36.5 (ref 7–25)
CALCIUM SPEC-SCNC: 7.7 MG/DL (ref 8.6–10.5)
CHLORIDE SERPL-SCNC: 103 MMOL/L (ref 98–107)
CO2 SERPL-SCNC: 19 MMOL/L (ref 22–29)
CREAT SERPL-MCNC: 0.85 MG/DL (ref 0.57–1)
DEPRECATED RDW RBC AUTO: 37 FL (ref 37–54)
EGFRCR SERPLBLD CKD-EPI 2021: 68.5 ML/MIN/1.73
ERYTHROCYTE [DISTWIDTH] IN BLOOD BY AUTOMATED COUNT: 13.4 % (ref 12.3–15.4)
GLUCOSE SERPL-MCNC: 132 MG/DL (ref 65–99)
HCT VFR BLD AUTO: 39.2 % (ref 34–46.6)
HGB BLD-MCNC: 12.8 G/DL (ref 12–15.9)
MAGNESIUM SERPL-MCNC: 2 MG/DL (ref 1.6–2.4)
MCH RBC QN AUTO: 25.4 PG (ref 26.6–33)
MCHC RBC AUTO-ENTMCNC: 32.7 G/DL (ref 31.5–35.7)
MCV RBC AUTO: 77.9 FL (ref 79–97)
PHOSPHATE SERPL-MCNC: 3.2 MG/DL (ref 2.5–4.5)
PLATELET # BLD AUTO: 304 10*3/MM3 (ref 140–450)
PMV BLD AUTO: 9.4 FL (ref 6–12)
POTASSIUM SERPL-SCNC: 4.7 MMOL/L (ref 3.5–5.2)
QT INTERVAL: 322 MS
QTC INTERVAL: 448 MS
RBC # BLD AUTO: 5.03 10*6/MM3 (ref 3.77–5.28)
SODIUM SERPL-SCNC: 133 MMOL/L (ref 136–145)
WBC NRBC COR # BLD AUTO: 11.17 10*3/MM3 (ref 3.4–10.8)

## 2024-11-02 PROCEDURE — 25010000002 ONDANSETRON PER 1 MG

## 2024-11-02 PROCEDURE — 85027 COMPLETE CBC AUTOMATED: CPT | Performed by: STUDENT IN AN ORGANIZED HEALTH CARE EDUCATION/TRAINING PROGRAM

## 2024-11-02 PROCEDURE — G0378 HOSPITAL OBSERVATION PER HR: HCPCS

## 2024-11-02 PROCEDURE — 25010000002 MORPHINE PER 10 MG: Performed by: NURSE PRACTITIONER

## 2024-11-02 PROCEDURE — 93005 ELECTROCARDIOGRAM TRACING: CPT | Performed by: STUDENT IN AN ORGANIZED HEALTH CARE EDUCATION/TRAINING PROGRAM

## 2024-11-02 PROCEDURE — 80069 RENAL FUNCTION PANEL: CPT | Performed by: STUDENT IN AN ORGANIZED HEALTH CARE EDUCATION/TRAINING PROGRAM

## 2024-11-02 PROCEDURE — 25010000002 ENOXAPARIN PER 10 MG: Performed by: STUDENT IN AN ORGANIZED HEALTH CARE EDUCATION/TRAINING PROGRAM

## 2024-11-02 PROCEDURE — 83735 ASSAY OF MAGNESIUM: CPT | Performed by: STUDENT IN AN ORGANIZED HEALTH CARE EDUCATION/TRAINING PROGRAM

## 2024-11-02 PROCEDURE — 97530 THERAPEUTIC ACTIVITIES: CPT

## 2024-11-02 PROCEDURE — 97162 PT EVAL MOD COMPLEX 30 MIN: CPT

## 2024-11-02 PROCEDURE — 93010 ELECTROCARDIOGRAM REPORT: CPT | Performed by: INTERNAL MEDICINE

## 2024-11-02 RX ORDER — MORPHINE SULFATE 2 MG/ML
2 INJECTION, SOLUTION INTRAMUSCULAR; INTRAVENOUS ONCE
Status: COMPLETED | OUTPATIENT
Start: 2024-11-02 | End: 2024-11-02

## 2024-11-02 RX ORDER — HYDROXYZINE HYDROCHLORIDE 25 MG/1
25 TABLET, FILM COATED ORAL 3 TIMES DAILY PRN
Status: DISCONTINUED | OUTPATIENT
Start: 2024-11-02 | End: 2024-11-11 | Stop reason: HOSPADM

## 2024-11-02 RX ORDER — TRAMADOL HYDROCHLORIDE 50 MG/1
25 TABLET ORAL EVERY 6 HOURS PRN
Status: DISCONTINUED | OUTPATIENT
Start: 2024-11-02 | End: 2024-11-02

## 2024-11-02 RX ORDER — POLYETHYLENE GLYCOL 3350 17 G/17G
17 POWDER, FOR SOLUTION ORAL
Status: DISCONTINUED | OUTPATIENT
Start: 2024-11-02 | End: 2024-11-11 | Stop reason: HOSPADM

## 2024-11-02 RX ORDER — OXYCODONE HYDROCHLORIDE 5 MG/1
5 TABLET ORAL EVERY 6 HOURS PRN
Status: DISCONTINUED | OUTPATIENT
Start: 2024-11-02 | End: 2024-11-02

## 2024-11-02 RX ORDER — TRAMADOL HYDROCHLORIDE 50 MG/1
50 TABLET ORAL EVERY 6 HOURS PRN
Status: DISPENSED | OUTPATIENT
Start: 2024-11-02 | End: 2024-11-07

## 2024-11-02 RX ADMIN — MORPHINE SULFATE 2 MG: 2 INJECTION, SOLUTION INTRAMUSCULAR; INTRAVENOUS at 02:17

## 2024-11-02 RX ADMIN — HYDROXYZINE HYDROCHLORIDE 25 MG: 25 TABLET ORAL at 11:10

## 2024-11-02 RX ADMIN — ENOXAPARIN SODIUM 40 MG: 100 INJECTION SUBCUTANEOUS at 21:16

## 2024-11-02 RX ADMIN — ACETAMINOPHEN 1000 MG: 500 TABLET ORAL at 11:12

## 2024-11-02 RX ADMIN — TRAMADOL HYDROCHLORIDE 50 MG: 50 TABLET, COATED ORAL at 21:26

## 2024-11-02 RX ADMIN — SENNOSIDES AND DOCUSATE SODIUM 2 TABLET: 50; 8.6 TABLET ORAL at 21:16

## 2024-11-02 RX ADMIN — POLYETHYLENE GLYCOL 3350 17 G: 17 POWDER, FOR SOLUTION ORAL at 18:58

## 2024-11-02 RX ADMIN — ACETAMINOPHEN 1000 MG: 500 TABLET ORAL at 04:52

## 2024-11-02 RX ADMIN — ACETAMINOPHEN 1000 MG: 500 TABLET ORAL at 21:16

## 2024-11-02 RX ADMIN — ESCITALOPRAM 20 MG: 20 TABLET, FILM COATED ORAL at 09:37

## 2024-11-02 RX ADMIN — SENNOSIDES AND DOCUSATE SODIUM 2 TABLET: 50; 8.6 TABLET ORAL at 09:37

## 2024-11-02 RX ADMIN — TRAMADOL HYDROCHLORIDE 25 MG: 50 TABLET ORAL at 15:15

## 2024-11-02 RX ADMIN — ONDANSETRON 4 MG: 2 INJECTION INTRAMUSCULAR; INTRAVENOUS at 15:13

## 2024-11-02 RX ADMIN — HYDROXYZINE HYDROCHLORIDE 25 MG: 25 TABLET ORAL at 18:58

## 2024-11-02 RX ADMIN — AMLODIPINE BESYLATE 10 MG: 10 TABLET ORAL at 09:37

## 2024-11-02 RX ADMIN — TRAMADOL HYDROCHLORIDE 25 MG: 50 TABLET ORAL at 09:37

## 2024-11-02 RX ADMIN — PANTOPRAZOLE SODIUM 40 MG: 40 TABLET, DELAYED RELEASE ORAL at 09:37

## 2024-11-02 NOTE — PLAN OF CARE
Goal Outcome Evaluation:  Plan of Care Reviewed With: patient, child           Outcome Evaluation: Pt POD2 R IM humerus nailing. PT NWB to R UE and in sling. Previously, pt I with mobility at home, however required assist with dressing and toiliting at times due to R shoulder pain. Pt able to perform bed mobility with mod/maxA x1 and STS with modA. Pt demonstrates facial grimace with light touch to shoulder and slightly groggy during this session with difficulty keeping eyes open. Pt able to take 2' sidesteps to HOB and maxA to return to bed. Pt most limited by R shoulder pain at this time and may require D/C to SNF vs home with HHPT and 24/7 care pending progress and family preference.    Anticipated Discharge Disposition (PT): skilled nursing facility, home with assist, home with 24/7 care, home with home health

## 2024-11-02 NOTE — SIGNIFICANT NOTE
11/02/24 1417   OTHER   Discipline occupational therapist   Rehab Time/Intention   Session Not Performed other (see comments)  (Patient in pain in AM, OT followed up in PM and patient had just finished working with PT and was sleeping. OT to follow up for eval tomorrow.)   Therapy Assessment/Plan (PT)   Criteria for Skilled Interventions Met (PT) yes   Recommendation   OT - Next Appointment 11/03/24

## 2024-11-02 NOTE — PLAN OF CARE
Goal Outcome Evaluation:              Outcome Evaluation: POD 2 R humerus nailing, A&O, RA, sling in place, VSS, atarax for anxiety, tramadol for pain, family at bedside, dressing c/d/i, voiding per PW, CTM

## 2024-11-02 NOTE — PROGRESS NOTES
"    Name: Madelyn Madrid ADMIT: 10/31/2024   : 1940  PCP: Therese Martinez MD    MRN: [id number] LOS: 0 days   AGE/SEX: 84 y.o. female  ROOM: [facility]     Subjective   Subjective   Patient seen this morning.  Continues to have significant right upper extremity pain, not controlled on current regimen, also had vomited oxycodone, spouse reports previously she could not tolerate oxycodone/hydrocodone pain meds, previously had vomiting with that.  Tramadol works better.  Change pain medication to tramadol.  Patient also with diffuse tremors, primarily in lower extremities, anxious appearing.  Patient is asking\" why am I  shaking\".  Spouse reports she has Tremors since recent lung biopsy    Review of Systems   As above  Objective   Objective   Vital Signs  Temp:  [97.5 °F (36.4 °C)-99 °F (37.2 °C)] 97.5 °F (36.4 °C)  Heart Rate:  [107-115] 107  Resp:  [16-18] 16  BP: (128-149)/(57-87) 132/73  SpO2:  [92 %-98 %] 95 %  on  Flow (L/min) (Oxygen Therapy):  [3] 3;   Device (Oxygen Therapy): room air  Body mass index is 19.75 kg/m².  Physical Exam    General: Alert, oriented to name, place and year,  HEENT: Normocephalic, atraumatic  CV: Tachycardic, regular rhythm  Lungs: CTA anteriorly, no wheezing  Abdomen: Soft, nontender, nondistended  Extremities: Persistent bilateral extremity tremors.  Right upper extremity in sling    Results Review     I reviewed the patient's new clinical results.  Results from last 7 days   Lab Units 24  0806 24  0835 10/31/24  2017 10/28/24  1351   WBC 10*3/mm3 11.17* 10.90* 11.64* 8.36   HEMOGLOBIN g/dL 12.8 14.4 12.3 11.1*   PLATELETS 10*3/mm3 304 417 382 338     Results from last 7 days   Lab Units 24  0806 24  1159 11/01/24  0835 10/31/24  2017   SODIUM mmol/L 133* 131* 135* 135*   POTASSIUM mmol/L 4.7 4.8 4.9 3.3*   CHLORIDE mmol/L 103 97* 97* 95*   CO2 mmol/L 19.0* 20.0* 21.1* 23.6   BUN mg/dL 31* 20 17 10   CREATININE mg/dL 0.85 0.99 0.92 0.78   GLUCOSE mg/dL " "132* 157* 156* 146*   Estimated Creatinine Clearance: 39.5 mL/min (by C-G formula based on SCr of 0.85 mg/dL).  Results from last 7 days   Lab Units 11/02/24  0806 10/31/24  2017   ALBUMIN g/dL 2.7* 3.5   BILIRUBIN mg/dL  --  0.4   ALK PHOS U/L  --  102   AST (SGOT) U/L  --  47*   ALT (SGPT) U/L  --  11     Results from last 7 days   Lab Units 11/02/24  0806 11/01/24  1159 11/01/24  0835 10/31/24  2017   CALCIUM mg/dL 7.7* 8.6 9.2 9.0   ALBUMIN g/dL 2.7*  --   --  3.5   MAGNESIUM mg/dL 2.0  --  1.9  --    PHOSPHORUS mg/dL 3.2  --  4.0  --      No results found for: \"COVID19\"  No results found for: \"HGBA1C\", \"POCGLU\"        XR Chest 1 View  Narrative: XR CHEST 1 VW-     HISTORY: Female who is 84 years-old, hypoxia     TECHNIQUE: Frontal view of the chest     COMPARISON: 10/23/2024     FINDINGS: The heart is enlarged. Pulmonary vasculature is unremarkable.  Aorta is tortuous, calcified. Previous CT demonstrated medial right  lower lobe pulmonary mass is not well characterized on this exam. No  acute infiltrate, pleural effusion, or pneumothorax. Right hemidiaphragm  is elevated. Surgical change of the right humerus is noted, at the level  of previously demonstrated pathologic fracture.     Impression: No acute infiltrate. Cardiomegaly. Tortuous aorta. Follow-up  as clinically indicated.     This report was finalized on 10/31/2024 8:57 PM by Dr. Wilmer Cosme M.D on Workstation: [id number]     XR Humerus Right  Narrative: XR HUMERUS RIGHT-     INDICATIONS: Postoperative evaluation.     TECHNIQUE: 3 views of the right humerus     COMPARISON: 10/31/2024 at 1607 hours     FINDINGS:     Postsurgical change of the right humerus is noted, with radiodense  marker extending across the pathologic fracture of the proximal humeral  shaft. Cortical step-off at the medial aspect of the fracture is  measured at 6 mm. Surgical soft tissue gas is evident.        Impression:    Postsurgical changes.           This report was " finalized on 10/31/2024 6:24 PM by Dr. Wilmer Cosme M.D on Workstation: [id number]     FL C Arm During Surgery  This procedure was auto-finalized with no dictation required.  Peripheral Block  Evelyn Nicolas MD     10/31/2024  3:32 PM  Peripheral Block    Patient reassessed immediately prior to procedure    Patient location during procedure: holding area  Reason for block: at surgeon's request and post-op pain management  Performed by  Anesthesiologist: Evelyn Nicolas MD  Preanesthetic Checklist  Completed: patient identified, IV checked, site marked, risks and benefits   discussed, surgical consent, monitors and equipment checked, pre-op   evaluation and timeout performed  Prep:  Pt Position: supine  Sterile barriers:gloves  Prep: ChloraPrep  Patient monitoring: continuous pulse oximetry, blood pressure monitoring   and EKG  Procedure    Sedation: yes  Performed under: MAC  Guidance:ultrasound guided    ULTRASOUND INTERPRETATION.  Using ultrasound guidance a 20 G gauge needle   was placed in close proximity to the brachial plexus nerve, at which   point, under ultrasound guidance anesthetic was injected in the area of   the nerve and spread of the anesthesia was seen on ultrasound in close   proximity thereto.  There were no abnormalities seen on ultrasound; a   digital image was taken; and the patient tolerated the procedure with no   complications. Images:still images obtained, printed/placed on chart    Laterality:right  Block Type:interscalene  Injection Technique:single-shot  Needle Type:echogenic  Needle Gauge:20 G    Medications Used: bupivacaine liposome (EXPAREL) 1.3 % injection -   Infiltration   10 mL - 10/31/2024 2:37:00 PM  bupivacaine PF (MARCAINE) 0.25 % injection - Injection   25 mL - 10/31/2024 2:36:00 PM    Medications  Comment:Ultrasound guidance was used to view and verify medication   disbursement.  Ultrasound guidance was used for needle placement.    Post Assessment  Injection Assessment:  negative aspiration for heme, no paresthesia on   injection and incremental injection  Patient Tolerance:comfortable throughout block  Complications:no  Additional Notes  Ultrasound guidance was used to guide needle placement, as well as to view   and verify medication disbursement.   Performed by: Evelyn Nicolas MD    Scheduled Medications  acetaminophen, 1,000 mg, Oral, Q8H  amLODIPine, 10 mg, Oral, Daily  enoxaparin, 40 mg, Subcutaneous, Nightly  escitalopram, 20 mg, Oral, QAM  pantoprazole, 40 mg, Oral, Q AM  polyethylene glycol, 17 g, Oral, BID AC  senna-docusate sodium, 2 tablet, Oral, BID    Infusions   Diet  Diet: Regular/House; Fluid Consistency: Thin (IDDSI 0)    I have personally reviewed     [x]  Laboratory   [x]  Microbiology   [x]  Radiology   []  EKG/Telemetry  []  Cardiology/Vascular   []  Pathology    []  Records       Assessment/Plan     Active Hospital Problems    Diagnosis  POA    **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     Ms. Madrid is a 84 y.o. 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 Post operatively, she was confused and unable to weaned off of her oxygen, and so [facility] was asked to admit her to the hospital overnight.       Hypoxemia-  -minor. Likely related to anesthesia.   - Chest xray is negative.  ABG unremarkable.  -Weaned down to room air  Encourage I-S-    Anxiety  -Initiate as needed  Atarax      Encephalopathy-  -likely toxic related to anesthesia.  Improved.      Hypokalemia-  WNL    Hypertension  BP stable-amlodipine    GERD-ppi      Adenocarcinoma of the lung with mets to the bone and a pathologic fracture of the right humerus  -s/p elective right humeral intramedullary nail/darin insertion on 10/31/24  -Schedule high-dose Tylenol for pain, unable to tolerate oxy, changed to as needed tramadol  -Scheduled bowel regimen    Hyponatremia  -Mild, clinically insignificant  Improved    Generalized tremors  -Spouse reports she started having tremors few weeks ago following lung biopsy  -Continues to have significant tremors.  Partly suspect due to anxiety, initiated on as needed Atarax as above  -Neurology consult          SCDs for DVT prophylaxis.  Full code.  Discussed with patient and nursing staff.  Discussed with family  Home, hopefully tomorrow pending neurology evaluation  Expected Discharge Date: 10/31/2024; Expected Discharge Time:        Copied text in this note has been reviewed and is accurate as of 11/02/24.         Dictated utilizing Dragon dictation        Miles Dietrich MD  [facility]  11/02/24  15:25 EDT

## 2024-11-02 NOTE — PLAN OF CARE
Goal Outcome Evaluation:  Plan of Care Reviewed With: patient        Progress: no change  Outcome Evaluation: VSS except HR, EKG completed showing sinus tach, alert and oriented x4 can be confused at night, 2LNC, Talisha 2.5 is not enough of a pain med, called and was given a one time order for morphine and that helped the pt tremendously, please consider changing, voiding per opal, d/c pt when medically ready.

## 2024-11-02 NOTE — THERAPY EVALUATION
Patient Name: Madelyn Madrid  : 1940    MRN: [id number]                              Today's Date: 2024       Admit Date: 10/31/2024    Visit Dx:     ICD-10-CM ICD-9-CM   1. 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    Surgery, elective    Hypertension    GERD (gastroesophageal reflux disease)    Depression    COPD (chronic obstructive pulmonary disease)     Past Medical History:   Diagnosis Date    Arthritis     COPD (chronic obstructive pulmonary disease) 10/2/24    CT scan    Depression     Emphysema of lung     CT scan    Endometrial cancer     GERD (gastroesophageal reflux disease)     Goiter     [place] (hard of hearing)     Hx of radiation therapy     GOES 5 TIMES A WEEK    Hypertension     Incontinence of urine     Lung mass     Pancreatitis      Past Surgical History:   Procedure Laterality Date    BRONCHOSCOPY WITH ION ROBOTIC ASSIST N/A 10/23/2024    Procedure: ROBOTIC BRONCHOSCOPY WITH FINE NEEDLE ASPIRATION AND CRYO BIOPSY, BRONCHOSCOPY WITH BRONCHOALVEOLAR LAVAGE;  Surgeon: Deann Iqbal MD;  Location: [facility];  Service: Robotics - Pulmonary;  Laterality: N/A;    COLONOSCOPY      HYSTERECTOMY      LAPAROSCOPY HYSTEROSCOPY ENDOMETRIAL ABLATION        General Information       Row Name 24 1615          Physical Therapy Time and Intention    Document Type evaluation  -CN     Mode of Treatment individual therapy;physical therapy  -CN       Row Name 24 1615          General Information    Patient Profile Reviewed yes  -CN     Prior Level of Function independent:;all household mobility;ADL's  -CN     Existing Precautions/Restrictions fall;right;weight bearing  R UE NWB  -CN   "     Row Name 11/02/24 1615          Living Environment    People in Home spouse  -CN       Row Name 11/02/24 1615          Cognition    Orientation Status (Cognition) oriented to;person  -CN       Row Name 11/02/24 1615          Safety Issues/Impairments Affecting Functional Mobility    Impairments Affecting Function (Mobility) balance;endurance/activity tolerance;pain;strength  -CN               User Key  (r) = Recorded By, (t) = Taken By, (c) = Cosigned By      Initials Name Provider Type    Meg Mclain, LEXY Physical Therapist                   Mobility       Row Name 11/02/24 1616          Bed Mobility    Bed Mobility supine-sit;sit-supine  -CN     Supine-Sit [place] (Bed Mobility) moderate assist (50% patient effort);verbal cues  -CN     Sit-Supine [place] (Bed Mobility) maximum assist (25% patient effort);verbal cues  -CN     Assistive Device (Bed Mobility) head of bed elevated  -CN       Row Name 11/02/24 1616          Sit-Stand Transfer    Sit-Stand [place] (Transfers) moderate assist (50% patient effort);1 person assist  -CN     Assistive Device (Sit-Stand Transfers) --  HHA x1  -CN       Row Name 11/02/24 1616          Gait/Stairs (Locomotion)    [place] Level (Gait) minimum assist (75% patient effort);moderate assist (50% patient effort)  -CN     Assistive Device (Gait) --  HHA x1  -CN     Distance in Feet (Gait) 2  -CN     Comment, (Gait/Stairs) sidestep to HOB, cues for erect posture and to \"march\" feet  -CN               User Key  (r) = Recorded By, (t) = Taken By, (c) = Cosigned By      Initials Name Provider Type    Meg Mclain, LEXY Physical Therapist                   Obj/Interventions       Row Name 11/02/24 1617          Range of Motion Comprehensive    Comment, General Range of Motion R UE in sling due to p/o IM humerus nailing  -CN       Row Name 11/02/24 1617          Strength Comprehensive (MMT)    General Manual Muscle Testing (MMT) Assessment " lower extremity strength deficits identified  -CN     Comment, General Manual Muscle Testing (MMT) Assessment Functional LE strength rated 4-/5  -CN               User Key  (r) = Recorded By, (t) = Taken By, (c) = Cosigned By      Initials Name Provider Type    Meg Mclain, PT Physical Therapist                   Goals/Plan       Row Name 11/02/24 1622          Bed Mobility Goal 1 (PT)    Activity/Assistive Device (Bed Mobility Goal 1, PT) bed mobility activities, all  -CN     [place] Level/Cues Needed (Bed Mobility Goal 1, PT) minimum assist (75% or more patient effort)  -CN     Time Frame (Bed Mobility Goal 1, PT) 1 week  -CN       Row Name 11/02/24 1622          Transfer Goal 1 (PT)    Activity/Assistive Device (Transfer Goal 1, PT) transfers, all  -CN     [place] Level/Cues Needed (Transfer Goal 1, PT) minimum assist (75% or more patient effort)  -CN     Time Frame (Transfer Goal 1, PT) 1 week  -CN       Row Name 11/02/24 1622          Gait Training Goal 1 (PT)    Activity/Assistive Device (Gait Training Goal 1, PT) gait (walking locomotion)  -CN     [place] Level (Gait Training Goal 1, PT) contact guard required  -CN     Distance (Gait Training Goal 1, PT) 50  -CN     Time Frame (Gait Training Goal 1, PT) 1 week  -CN       Row Name 11/02/24 1622          Therapy Assessment/Plan (PT)    Planned Therapy Interventions (PT) balance training;bed mobility training;gait training;home exercise program;neuromuscular re-education;stretching;strengthening;transfer training;patient/family education  -CN               User Key  (r) = Recorded By, (t) = Taken By, (c) = Cosigned By      Initials Name Provider Type    Meg Mclain, PT Physical Therapist                   Clinical Impression       Row Name 11/02/24 1618          Pain    Additional Documentation Pain Scale: FACES Pre/Post-Treatment (Group)  -EWA       Row Name 11/02/24 1618          Pain Scale: FACES Pre/Post-Treatment     Pain: FACES Scale, Pretreatment 2-->hurts little bit  -CN     Posttreatment Pain Rating 4-->hurts little more  -CN       Row Name 11/02/24 1618          Plan of Care Review    Plan of Care Reviewed With patient;child  -CN     Outcome Evaluation Pt POD2 R IM humerus nailing. PT NWB to R UE and in sling. Previously, pt I with mobility at home, however required assist with dressing and toiliting at times due to R shoulder pain. Pt able to perform bed mobility with mod/maxA x1 and STS with modA. Pt demonstrates facial grimace with light touch to shoulder and slightly groggy during this session with difficulty keeping eyes open. Pt able to take 2' sidesteps to HOB and maxA to return to bed. Pt most limited by R shoulder pain at this time and may require D/C to SNF vs home with HHPT and 24/7 care pending progress and family preference.  -CN       Row Name 11/02/24 1618          Therapy Assessment/Plan (PT)    Rehab Potential (PT) fair  -CN     Criteria for Skilled Interventions Met (PT) yes  -CN     Therapy Frequency (PT) 6 times/wk  -CN       Row Name 11/02/24 1618          Positioning and Restraints    Pre-Treatment Position in bed  -CN     Post Treatment Position bed  -CN     In Bed fowlers;call light within reach;encouraged to call for assist;exit alarm on;with family/caregiver;RUE elevated  -CN               User Key  (r) = Recorded By, (t) = Taken By, (c) = Cosigned By      Initials Name Provider Type    CN Meg Alan, PT Physical Therapist                   Outcome Measures       Row Name 11/02/24 1622          How much help from another person do you currently need...    Turning from your back to your side while in flat bed without using bedrails? 2  -CN     Moving from lying on back to sitting on the side of a flat bed without bedrails? 2  -CN     Moving to and from a bed to a chair (including a wheelchair)? 3  -CN     Standing up from a chair using your arms (e.g., wheelchair, bedside chair)? 2   -CN     Climbing 3-5 steps with a railing? 1  -CN     To walk in hospital room? 2  -CN     AM-PAC 6 Clicks Score (PT) 12  -CN     Highest Level of Mobility Goal 4 --> Transfer to chair/commode  -CN       Row Name 11/02/24 1622          Functional Assessment    Outcome Measure Options AM-PAC 6 Clicks Basic Mobility (PT)  -CN               User Key  (r) = Recorded By, (t) = Taken By, (c) = Cosigned By      Initials Name Provider Type    CN Meg Alan, PT Physical Therapist                                 Physical Therapy Education       Title: PT OT SLP Therapies (Done)       Topic: Physical Therapy (Done)       Point: Mobility training (Done)       Learning Progress Summary            Patient Acceptance, E, VU,NR by CN at 11/2/2024 1623                      Point: Home exercise program (Done)       Learning Progress Summary            Patient Acceptance, E, VU,NR by CN at 11/2/2024 1623                      Point: Body mechanics (Done)       Learning Progress Summary            Patient Acceptance, E, VU,NR by CN at 11/2/2024 1623                      Point: Precautions (Done)       Learning Progress Summary            Patient Acceptance, E, VU,NR by CN at 11/2/2024 1623                                      User Key       Initials Effective Dates Name Provider Type Discipline    CN 06/16/21 -  Meg Alan, LEXY Physical Therapist PT                  PT Recommendation and Plan  Planned Therapy Interventions (PT): balance training, bed mobility training, gait training, home exercise program, neuromuscular re-education, stretching, strengthening, transfer training, patient/family education  Outcome Evaluation: Pt POD2 R IM humerus nailing. PT NWB to R UE and in sling. Previously, pt I with mobility at home, however required assist with dressing and toiliting at times due to R shoulder pain. Pt able to perform bed mobility with mod/maxA x1 and STS with modA. Pt demonstrates facial grimace with light  touch to shoulder and slightly groggy during this session with difficulty keeping eyes open. Pt able to take 2' sidesteps to HOB and maxA to return to bed. Pt most limited by R shoulder pain at this time and may require D/C to SNF vs home with HHPT and 24/7 care pending progress and family preference.     Time Calculation:         PT Charges       Row Name 11/02/24 1623             Time Calculation    Start Time 1316  -CN      Stop Time 1345  -CN      Time Calculation (min) 29 min  -CN      PT Received On 11/02/24  -CN      PT - Next Appointment 11/03/24  -CN         Timed Charges    69773 - PT Therapeutic Activity Minutes 25  -CN         Total Minutes    Timed Charges Total Minutes 25  -CN       Total Minutes 25  -CN                User Key  (r) = Recorded By, (t) = Taken By, (c) = Cosigned By      Initials Name Provider Type    Meg Mclain, PT Physical Therapist                  Therapy Charges for Today       Code Description Service Date Service Provider Modifiers Qty    06226451144 HC PT THERAPEUTIC ACT EA 15 MIN 11/2/2024 Meg Alan, PT GP 2    [id number]  PT EVAL MOD COMPLEXITY 1 11/2/2024 Meg Alan, PT GP 1            PT G-Codes  Outcome Measure Options: AM-PAC 6 Clicks Basic Mobility (PT)  AM-PAC 6 Clicks Score (PT): 12  PT Discharge Summary  Anticipated Discharge Disposition (PT): skilled nursing facility, home with assist, home with 24/7 care, home with home health    Meg Alan PT  11/2/2024

## 2024-11-03 LAB
ALBUMIN SERPL-MCNC: 2.4 G/DL (ref 3.5–5.2)
ALBUMIN/GLOB SERPL: 0.8 G/DL
ALP SERPL-CCNC: 78 U/L (ref 39–117)
ALT SERPL W P-5'-P-CCNC: 7 U/L (ref 1–33)
ANION GAP SERPL CALCULATED.3IONS-SCNC: 11.8 MMOL/L (ref 5–15)
AST SERPL-CCNC: 27 U/L (ref 1–32)
BILIRUB SERPL-MCNC: 0.4 MG/DL (ref 0–1.2)
BUN SERPL-MCNC: 33 MG/DL (ref 8–23)
BUN/CREAT SERPL: 39.3 (ref 7–25)
CALCIUM SPEC-SCNC: 8.7 MG/DL (ref 8.6–10.5)
CHLORIDE SERPL-SCNC: 98 MMOL/L (ref 98–107)
CO2 SERPL-SCNC: 20.2 MMOL/L (ref 22–29)
CREAT SERPL-MCNC: 0.84 MG/DL (ref 0.57–1)
DEPRECATED RDW RBC AUTO: 37.3 FL (ref 37–54)
EGFRCR SERPLBLD CKD-EPI 2021: 69.5 ML/MIN/1.73
ERYTHROCYTE [DISTWIDTH] IN BLOOD BY AUTOMATED COUNT: 13.3 % (ref 12.3–15.4)
GLOBULIN UR ELPH-MCNC: 2.9 GM/DL
GLUCOSE SERPL-MCNC: 95 MG/DL (ref 65–99)
HCT VFR BLD AUTO: 36.3 % (ref 34–46.6)
HGB BLD-MCNC: 11.8 G/DL (ref 12–15.9)
MCH RBC QN AUTO: 25.2 PG (ref 26.6–33)
MCHC RBC AUTO-ENTMCNC: 32.5 G/DL (ref 31.5–35.7)
MCV RBC AUTO: 77.6 FL (ref 79–97)
PLATELET # BLD AUTO: 304 10*3/MM3 (ref 140–450)
PMV BLD AUTO: 10.2 FL (ref 6–12)
POTASSIUM SERPL-SCNC: 4.5 MMOL/L (ref 3.5–5.2)
PROT SERPL-MCNC: 5.3 G/DL (ref 6–8.5)
RBC # BLD AUTO: 4.68 10*6/MM3 (ref 3.77–5.28)
SODIUM SERPL-SCNC: 129 MMOL/L (ref 136–145)
SODIUM SERPL-SCNC: 130 MMOL/L (ref 136–145)
WBC NRBC COR # BLD AUTO: 11.09 10*3/MM3 (ref 3.4–10.8)

## 2024-11-03 PROCEDURE — 25810000003 SODIUM CHLORIDE 0.9 % SOLUTION: Performed by: STUDENT IN AN ORGANIZED HEALTH CARE EDUCATION/TRAINING PROGRAM

## 2024-11-03 PROCEDURE — 85027 COMPLETE CBC AUTOMATED: CPT | Performed by: STUDENT IN AN ORGANIZED HEALTH CARE EDUCATION/TRAINING PROGRAM

## 2024-11-03 PROCEDURE — 99222 1ST HOSP IP/OBS MODERATE 55: CPT | Performed by: STUDENT IN AN ORGANIZED HEALTH CARE EDUCATION/TRAINING PROGRAM

## 2024-11-03 PROCEDURE — 97535 SELF CARE MNGMENT TRAINING: CPT

## 2024-11-03 PROCEDURE — 80053 COMPREHEN METABOLIC PANEL: CPT | Performed by: STUDENT IN AN ORGANIZED HEALTH CARE EDUCATION/TRAINING PROGRAM

## 2024-11-03 PROCEDURE — 25010000002 ENOXAPARIN PER 10 MG: Performed by: STUDENT IN AN ORGANIZED HEALTH CARE EDUCATION/TRAINING PROGRAM

## 2024-11-03 PROCEDURE — 84295 ASSAY OF SERUM SODIUM: CPT | Performed by: STUDENT IN AN ORGANIZED HEALTH CARE EDUCATION/TRAINING PROGRAM

## 2024-11-03 PROCEDURE — 97166 OT EVAL MOD COMPLEX 45 MIN: CPT

## 2024-11-03 RX ORDER — SODIUM CHLORIDE 9 MG/ML
125 INJECTION, SOLUTION INTRAVENOUS CONTINUOUS
Status: ACTIVE | OUTPATIENT
Start: 2024-11-03 | End: 2024-11-03

## 2024-11-03 RX ORDER — SODIUM CHLORIDE 9 MG/ML
125 INJECTION, SOLUTION INTRAVENOUS CONTINUOUS
Status: DISCONTINUED | OUTPATIENT
Start: 2024-11-03 | End: 2024-11-03

## 2024-11-03 RX ADMIN — SENNOSIDES AND DOCUSATE SODIUM 2 TABLET: 50; 8.6 TABLET ORAL at 20:43

## 2024-11-03 RX ADMIN — TRAMADOL HYDROCHLORIDE 50 MG: 50 TABLET, COATED ORAL at 16:03

## 2024-11-03 RX ADMIN — ACETAMINOPHEN 1000 MG: 500 TABLET ORAL at 20:43

## 2024-11-03 RX ADMIN — HYDROXYZINE HYDROCHLORIDE 25 MG: 25 TABLET ORAL at 05:17

## 2024-11-03 RX ADMIN — TRAMADOL HYDROCHLORIDE 50 MG: 50 TABLET, COATED ORAL at 10:21

## 2024-11-03 RX ADMIN — HYDROXYZINE HYDROCHLORIDE 25 MG: 25 TABLET ORAL at 16:03

## 2024-11-03 RX ADMIN — TRAMADOL HYDROCHLORIDE 50 MG: 50 TABLET, COATED ORAL at 23:21

## 2024-11-03 RX ADMIN — ENOXAPARIN SODIUM 40 MG: 100 INJECTION SUBCUTANEOUS at 20:43

## 2024-11-03 RX ADMIN — SODIUM CHLORIDE 125 ML/HR: 9 INJECTION, SOLUTION INTRAVENOUS at 10:21

## 2024-11-03 RX ADMIN — POLYETHYLENE GLYCOL 3350 17 G: 17 POWDER, FOR SOLUTION ORAL at 10:21

## 2024-11-03 RX ADMIN — ESCITALOPRAM 20 MG: 20 TABLET, FILM COATED ORAL at 10:21

## 2024-11-03 RX ADMIN — ACETAMINOPHEN 1000 MG: 500 TABLET ORAL at 05:13

## 2024-11-03 RX ADMIN — ACETAMINOPHEN 1000 MG: 500 TABLET ORAL at 13:04

## 2024-11-03 RX ADMIN — PANTOPRAZOLE SODIUM 40 MG: 40 TABLET, DELAYED RELEASE ORAL at 05:13

## 2024-11-03 RX ADMIN — AMLODIPINE BESYLATE 10 MG: 10 TABLET ORAL at 10:21

## 2024-11-03 NOTE — PROGRESS NOTES
Name: Madelyn Madrid ADMIT: 10/31/2024   : 1940  PCP: Therese Martinez MD    MRN: [id number] LOS: 0 days   AGE/SEX: 84 y.o. female  ROOM: [facility]     Subjective   Subjective   Patient seen this morning, spouse present at bedside.  Patient had better night, slept, tremors improved, add as needed Atarax seems to be helping.  Pain better controlled with increased dose of tramadol.  Had bowel movement yesterday.  P.o. intake remains poor.  Review of Systems   As above  Objective   Objective   Vital Signs  Temp:  [97.5 °F (36.4 °C)-98.3 °F (36.8 °C)] 98.2 °F (36.8 °C)  Heart Rate:  [107-118] 112  Resp:  [14-16] 16  BP: (116-139)/(59-82) 139/82  SpO2:  [93 %-96 %] 96 %  on  Flow (L/min) (Oxygen Therapy):  [2] 2;   Device (Oxygen Therapy): nasal cannula  Body mass index is 19.75 kg/m².  Physical Exam    General: Alert, laying in bed, not in distress, anxious, ill-appearing  HEENT: Normocephalic, atraumatic  CV: Tachycardic, regular rhythm  Lungs: CTA anteriorly, no wheezing  Abdomen: Soft, nontender, nondistended  Extremities: Right upper extremity sling in place, no significant edema    Results Review     I reviewed the patient's new clinical results.  Results from last 7 days   Lab Units 24  0353 11/02/24  0824  0835 10/31/24  2017   WBC 10*3/mm3 11.09* 11.17* 10.90* 11.64*   HEMOGLOBIN g/dL 11.8* 12.8 14.4 12.3   PLATELETS 10*3/mm3 304 304 417 382     Results from last 7 days   Lab Units 24  0353 24  0806 24  1159 24   SODIUM mmol/L 130* 133* 131* 135*   POTASSIUM mmol/L 4.5 4.7 4.8 4.9   CHLORIDE mmol/L 98 103 97* 97*   CO2 mmol/L 20.2* 19.0* 20.0* 21.1*   BUN mg/dL 33* 31* 20 17   CREATININE mg/dL 0.84 0.85 0.99 0.92   GLUCOSE mg/dL 95 132* 157* 156*   Estimated Creatinine Clearance: 39.9 mL/min (by C-G formula based on SCr of 0.84 mg/dL).  Results from last 7 days   Lab Units 24  0353 24  0806 10/31/24  2017   ALBUMIN g/dL 2.4* 2.7* 3.5   BILIRUBIN  "mg/dL 0.4  --  0.4   ALK PHOS U/L 78  --  102   AST (SGOT) U/L 27  --  47*   ALT (SGPT) U/L 7  --  11     Results from last 7 days   Lab Units 11/03/24  0353 11/02/24  0806 11/01/24  1159 11/01/24  0835 10/31/24  2017   CALCIUM mg/dL 8.7 7.7* 8.6 9.2 9.0   ALBUMIN g/dL 2.4* 2.7*  --   --  3.5   MAGNESIUM mg/dL  --  2.0  --  1.9  --    PHOSPHORUS mg/dL  --  3.2  --  4.0  --      No results found for: \"COVID19\"  No results found for: \"HGBA1C\", \"POCGLU\"        XR Chest 1 View  Narrative: XR CHEST 1 VW-     HISTORY: Female who is 84 years-old, hypoxia     TECHNIQUE: Frontal view of the chest     COMPARISON: 10/23/2024     FINDINGS: The heart is enlarged. Pulmonary vasculature is unremarkable.  Aorta is tortuous, calcified. Previous CT demonstrated medial right  lower lobe pulmonary mass is not well characterized on this exam. No  acute infiltrate, pleural effusion, or pneumothorax. Right hemidiaphragm  is elevated. Surgical change of the right humerus is noted, at the level  of previously demonstrated pathologic fracture.     Impression: No acute infiltrate. Cardiomegaly. Tortuous aorta. Follow-up  as clinically indicated.     This report was finalized on 10/31/2024 8:57 PM by Dr. Wilmer Cosme M.D on Workstation: [employer]     XR Humerus Right  Narrative: XR HUMERUS RIGHT-     INDICATIONS: Postoperative evaluation.     TECHNIQUE: 3 views of the right humerus     COMPARISON: 10/31/2024 at 1607 hours     FINDINGS:     Postsurgical change of the right humerus is noted, with radiodense  marker extending across the pathologic fracture of the proximal humeral  shaft. Cortical step-off at the medial aspect of the fracture is  measured at 6 mm. Surgical soft tissue gas is evident.        Impression:    Postsurgical changes.           This report was finalized on 10/31/2024 6:24 PM by Dr. Wilmer Cosme M.D on Workstation: [employer]     FL C Arm During Surgery  This procedure was auto-finalized with no dictation " required.  Peripheral Block  Evelyn Nicolas MD     10/31/2024  3:32 PM  Peripheral Block    Patient reassessed immediately prior to procedure    Patient location during procedure: holding area  Reason for block: at surgeon's request and post-op pain management  Performed by  Anesthesiologist: Evelyn Nicolas MD  Preanesthetic Checklist  Completed: patient identified, IV checked, site marked, risks and benefits   discussed, surgical consent, monitors and equipment checked, pre-op   evaluation and timeout performed  Prep:  Pt Position: supine  Sterile barriers:gloves  Prep: ChloraPrep  Patient monitoring: continuous pulse oximetry, blood pressure monitoring   and EKG  Procedure    Sedation: yes  Performed under: MAC  Guidance:ultrasound guided    ULTRASOUND INTERPRETATION.  Using ultrasound guidance a 20 G gauge needle   was placed in close proximity to the brachial plexus nerve, at which   point, under ultrasound guidance anesthetic was injected in the area of   the nerve and spread of the anesthesia was seen on ultrasound in close   proximity thereto.  There were no abnormalities seen on ultrasound; a   digital image was taken; and the patient tolerated the procedure with no   complications. Images:still images obtained, printed/placed on chart    Laterality:right  Block Type:interscalene  Injection Technique:single-shot  Needle Type:echogenic  Needle Gauge:20 G    Medications Used: bupivacaine liposome (EXPAREL) 1.3 % injection -   Infiltration   10 mL - 10/31/2024 2:37:00 PM  bupivacaine PF (MARCAINE) 0.25 % injection - Injection   25 mL - 10/31/2024 2:36:00 PM    Medications  Comment:Ultrasound guidance was used to view and verify medication   disbursement.  Ultrasound guidance was used for needle placement.    Post Assessment  Injection Assessment: negative aspiration for heme, no paresthesia on   injection and incremental injection  Patient Tolerance:comfortable throughout block  Complications:no  Additional  Notes  Ultrasound guidance was used to guide needle placement, as well as to view   and verify medication disbursement.   Performed by: Evelyn Nicolas MD    Scheduled Medications  acetaminophen, 1,000 mg, Oral, Q8H  amLODIPine, 10 mg, Oral, Daily  enoxaparin, 40 mg, Subcutaneous, Nightly  escitalopram, 20 mg, Oral, QAM  pantoprazole, 40 mg, Oral, Q AM  polyethylene glycol, 17 g, Oral, BID AC  senna-docusate sodium, 2 tablet, Oral, BID    Infusions  sodium chloride, 125 mL/hr, Last Rate: 125 mL/hr (11/03/24 1021)    Diet  Diet: Regular/House; Fluid Consistency: Thin (IDDSI 0)    I have personally reviewed     [x]  Laboratory   [x]  Microbiology   [x]  Radiology   []  EKG/Telemetry  []  Cardiology/Vascular   []  Pathology    []  Records       Assessment/Plan     Active Hospital Problems    Diagnosis  POA    **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     Ms. Madrid is a 84 y.o. 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 Post operatively, she was confused and unable to weaned off of her oxygen, and so [facility] was asked to admit her to the hospital overnight.       Hypoxemia-  -minor. Likely related to anesthesia.   - Chest xray is negative.  ABG unremarkable.  -Weaned down to room air  Encourage I-S-    Anxiety  -Continue as needed Atarax      Encephalopathy-  -likely toxic related to anesthesia.  Improved.      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  -Suspect due to poor p.o. intake.  Protein shakes ordered, encourage p.o. intake.  IV fluids      Generalized tremors  -Spouse reports she started having tremors few weeks ago following lung biopsy  -Continues to have significant tremors.  Partly suspect due to anxiety, initiated on as needed Atarax as above  -Neurology consult pending  -Tremors improved          SCDs for DVT prophylaxis.  Full code.  Discussed with patient and nursing staff.  Discussed with family  Home with home health versus SNF pending clinical progress, timing to be determined.  Patient very weak, patient decreased p.o. intake.  PT recommends SNF.  Expected Discharge Date: 10/31/2024; Expected Discharge Time:        Copied text in this note has been reviewed and is accurate as of 11/03/24.         Dictated utilizing Dragon dictation        Miles Dietrich MD  [facility]  11/03/24  11:40 EST

## 2024-11-03 NOTE — PLAN OF CARE
Goal Outcome Evaluation:              Outcome Evaluation: Pt still on O2 to keep her sat. >90's.Ask for pain med as needed.Atarax was given as needed and noted to have positive result,significant decrease if not none on the tremors or uncontrolled movement on BLE. Meds given with apple sauce to encourage increase in oral intake. Pt able to swallow her pills  whole. Spouse at bedside attentive to pt needs.

## 2024-11-03 NOTE — PLAN OF CARE
Goal Outcome Evaluation:POD3 IM nailing of right humerus, VSS, afebrile, able to wean off o2 during daytime. Sons concerned about pt going home with spouse stating spouse in unable to take care of her. Family concerned that she is not eating enough and has no energy. Son Pavel would like to have CCP and MD to discuss SNF with spouse and family in person. Spouse will be here Monday morning and all day with pt. Son asks that they not talk to spouse on the phone, but in person, as spouse has nonrealistic goals of pt returning home with him.

## 2024-11-03 NOTE — CONSULTS
"Neurology Consult Note    Consult Date: 11/3/2024    Referring MD: Elmo Altman MD    Reason for Consult I have been asked to see the patient in neurological consultation to render advice and opinion regarding generalized tremor    Madelyn Madrid is a 84 y.o. 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  He was confused so A admitted him for further observation.    Since family noted that she was more tremulous in her legs yesterday she went to go sit dose of hydroxyzine and it made her get better.  She was also slightly confused postop but this is much better today morning.    She denies any new weakness vision or speech problems.    He states today morning she is much better more oriented and also the tremulousness has gotten better.    Past Medical History:   Diagnosis Date    Arthritis     COPD (chronic obstructive pulmonary disease) 10/2/24    CT scan    Depression     Emphysema of lung 10_2_24    CT scan    Endometrial cancer     GERD (gastroesophageal reflux disease)     Goiter     [place] (hard of hearing)     Hx of radiation therapy     GOES 5 TIMES A WEEK    Hypertension     Incontinence of urine     Lung mass     Pancreatitis        Exam  /82 (BP Location: Left arm, Patient Position: Lying)   Pulse 112   Temp 98.2 °F (36.8 °C) (Oral)   Resp 16   Ht 157.5 cm (62\")   LMP  (LMP Unknown)   SpO2 96%   BMI 19.75 kg/m²   Gen: NAD, vitals reviewed  Hard of hearing and very frail looking elderly woman  MS: Alert and awake oriented x 2 normal comprehension repetition and fluency  CN: visual acuity grossly normal, PERRL, EOMI, no facial droop, no dysarthria  Motor: Right upper extremity did not test due to recent procedure  Rest of the extremities at least 4+/5  She has diffuse muscle loss    DATA:    Lab Results "   Component Value Date    GLUCOSE 95 11/03/2024    CALCIUM 8.7 11/03/2024     (L) 11/03/2024    K 4.5 11/03/2024    CO2 20.2 (L) 11/03/2024    CL 98 11/03/2024    BUN 33 (H) 11/03/2024    CREATININE 0.84 11/03/2024    BCR 39.3 (H) 11/03/2024    ANIONGAP 11.8 11/03/2024     Lab Results   Component Value Date    WBC 11.09 (H) 11/03/2024    HGB 11.8 (L) 11/03/2024    HCT 36.3 11/03/2024    MCV 77.6 (L) 11/03/2024     11/03/2024       Lab review:   Sodium 129  Creatinine 0.84  Normal LFTs  Blood glucose 95    WBC 11.09  Hemoglobin 11.8 and platelets 304    Imaging review:   Recent PET scan done on 10/22/2024 showed multiple hypermetabolic regions including liver bones lymph nodes and also right lower lung base    MRI brain done on 10/21/2024 acute diffusion restriction moderate to severe T2 flair changes and diffuse cortical atrophy    Diagnoses:  Acute metabolic encephalopathy  Abnormal movements    Adenocarcinoma of the lung with mets to the bone  Right humeral pathological fracture status postreduction  Hypertension    Pre-stroke MRS: 3  NIHSS: 0    Plan   -She recently had an MRI brain which was unremarkable for acute pathology  -The tremulousness have seen in her legs is most likely related to her anxiety/shivering from cold but this has gotten much better over time.    No further workup from neurology if she develops worsening tremulousness or develops new neurological signs and symptoms kindly call us back.      MDM   Reviewed: Previous charts, nursing notes and vitals   Reviewed: Previous labs and MRI scan    Interpretation: Labs and MRI scan   Total time providing care is :30-74 minutes. This excluded time spent performing separately reportable procedures and services  Consults :Neurology/Stroke    Please note that portions of this note were completed with a voice recognition program.     Mark Sanders MD  Neuro Hospitalist /Vascular Neurology.

## 2024-11-03 NOTE — PLAN OF CARE
Goal Outcome Evaluation:              Outcome Evaluation: Patient is an 84 year old female with malignant neoplasm metastatic cancer in R humerus, s/p ORIF on 10/31/24. Patient states she wants to go to the bathroom, is very weak and ill looking in bed. She requires max A to get to EOB from supine position, able to initially sit at EOB with SBA/CGA. Patient performed sit to stand from EOB with mod A. able to take steps to bathroom but very shaky, with BLE's quivering with WB. Nursing aid present and assists with mobility to bathroom, requiring max Ax2 to get to toilet. After toileting, patient requires max A with all aspects of toileting. Upon standing, patient reports feeling faint, became weak, and had to sit back down on toilet, felt she was going to pass out. She required max Ax2 to get off of toilet, leaned onto nuring aid for support in standing and required max Ax2, for toilet to recliner chair squat pivot transfer. Patient, son and daughter present at visit today, had planned to take patient home, with elderly spouse as primary caregiver. Patient is unable to participate in ADLs at current level, instructed family that she may need bed changes at times. Son expresses concern of patient's lack of nutrition, feels she may eat more at home. OT recommends SNF placement at this time, as she is unable to tolerate minimal exertion for participation in self care. Son states they do not have a bedside commode or wheelchair at home, and OT concerned that patient would be bedbound at home in current condition. Son states her spouse has foot drop, so he likely cannot provide mobility support that the patient currently needs.    Anticipated Discharge Disposition (OT): skilled nursing facility

## 2024-11-03 NOTE — THERAPY EVALUATION
Patient Name: Madelyn Madrid  : 1940    MRN: [id number]                              Today's Date: 11/3/2024       Admit Date: 10/31/2024    Visit Dx:     ICD-10-CM ICD-9-CM   1. 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    Surgery, elective    Hypertension    GERD (gastroesophageal reflux disease)    Depression    COPD (chronic obstructive pulmonary disease)     Past Medical History:   Diagnosis Date    Arthritis     COPD (chronic obstructive pulmonary disease) 10/2/24    CT scan    Depression     Emphysema of lung     CT scan    Endometrial cancer     GERD (gastroesophageal reflux disease)     Goiter     [place] (hard of hearing)     Hx of radiation therapy     GOES 5 TIMES A WEEK    Hypertension     Incontinence of urine     Lung mass     Pancreatitis      Past Surgical History:   Procedure Laterality Date    BRONCHOSCOPY WITH ION ROBOTIC ASSIST N/A 10/23/2024    Procedure: ROBOTIC BRONCHOSCOPY WITH FINE NEEDLE ASPIRATION AND CRYO BIOPSY, BRONCHOSCOPY WITH BRONCHOALVEOLAR LAVAGE;  Surgeon: Deann Iqbal MD;  Location: [facility] ENDOSCOPY;  Service: Robotics - Pulmonary;  Laterality: N/A;    COLONOSCOPY      HYSTERECTOMY      LAPAROSCOPY HYSTEROSCOPY ENDOMETRIAL ABLATION        General Information       Row Name 24 1202          OT Time and Intention    Subjective Information complains of;weakness;fatigue;pain  -     Document Type evaluation  -     Mode of Treatment individual therapy;occupational therapy  -     Patient Effort adequate  -     Symptoms Noted During/After Treatment dizziness;fatigue;increased pain;shortness of breath  -       Row Name 24 1202          General Information     Patient Profile Reviewed yes  -     Prior Level of Function independent:  assist over past month since humeral fracture with ADLs from spouse  -     Existing Precautions/Restrictions fall;right;weight bearing  NWB RUE  -     Barriers to Rehab previous functional deficit;medically complex  -[facility] Name 11/03/24 1202          Living Environment    People in Home spouse  -[facility] Name 11/03/24 1202          Cognition    Orientation Status (Cognition) oriented to;person;place;verbal cues/prompts needed for orientation;time  -       Row Name 11/03/24 1202          Safety Issues/Impairments Affecting Functional Mobility    Impairments Affecting Function (Mobility) balance;endurance/activity tolerance;pain;strength  -               User Key  (r) = Recorded By, (t) = Taken By, (c) = Cosigned By      Initials Name Provider Type     Evelyn Moreno OT Occupational Therapist                     Mobility/ADL's       [facility] Name 11/03/24 1203          Bed Mobility    Bed Mobility supine-sit;sit-supine  -     Supine-Sit [place] (Bed Mobility) moderate assist (50% patient effort);verbal cues  -     Assistive Device (Bed Mobility) head of bed elevated  -[facility] Name 11/03/24 1203          Sit-Stand Transfer    Sit-Stand [place] (Transfers) moderate assist (50% patient effort);1 person assist  -     Assistive Device (Sit-Stand Transfers) --  HHA  -[facility] Name 11/03/24 1203          Functional Mobility    Functional Mobility- Ind. Level maximum assist (25% patient effort);2 person assist required  [facility] patient very shaky, OT required assistance from aid for ambulation to bathroom, and assist of another aid for management of IV pole  -     Patient was able to Ambulate yes  only able to ambulate to the bathroom with initially min A, to max Ax2.  -       Row Name 11/03/24 1203          Activities of Daily Living    BADL Assessment/Intervention toileting  -       Row Name 11/03/24 1203           Toileting Assessment/Training    [place] Level (Toileting) toileting skills;adjust/manage clothing;perform perineal hygiene;maximum assist (25% patient effort)  -     Assistive Devices (Toileting) commode;grab bar/safety frame  -     Position (Toileting) supported sitting;supported standing  -               User Key  (r) = Recorded By, (t) = Taken By, (c) = Cosigned By      Initials Name Provider Type     Evelyn Moreno OT Occupational Therapist                   Obj/Interventions       [facility] Name 11/03/24 1205          Range of Motion Comprehensive    Comment, General Range of Motion RUE in sling, ROM NT.  -[facility] Name 11/03/24 1205          Strength Comprehensive (MMT)    General Manual Muscle Testing (MMT) Assessment upper extremity strength deficits identified  -     Comment, General Manual Muscle Testing (MMT) Assessment LUE 3-/5, RUE NT  -[facility] Name 11/03/24 1205          Motor Skills    Motor Skills functional endurance  -     Functional Endurance very poor  -LH       Row Name 11/03/24 1205          Balance    Balance Assessment sitting static balance;sitting dynamic balance;sit to stand dynamic balance;standing static balance;standing dynamic balance  -     Static Sitting Balance contact guard;minimal assist  -     Dynamic Sitting Balance minimal assist  -     Position, Sitting Balance unsupported  -     Sit to Stand Dynamic Balance moderate assist  -     Static Standing Balance minimal assist;moderate assist;maximum assist  Initially required min A, but required increased assistance with standing for toilet hygiene  -     Dynamic Standing Balance maximum assist;2-person assist  -     Balance Interventions sitting;standing;sit to stand;occupation based/functional task  -               User Key  (r) = Recorded By, (t) = Taken By, (c) = Cosigned By      Initials Name Provider Type     Evelyn Moreno OT Occupational Therapist                   Goals/Plan        Row Name 11/03/24 1217          Bed Mobility Goal 1 (OT)    Activity/Assistive Device (Bed Mobility Goal 1, OT) bed mobility activities, all  -     [place] Level/Cues Needed (Bed Mobility Goal 1, OT) minimum assist (75% or more patient effort)  -     Time Frame (Bed Mobility Goal 1, OT) short term goal (STG);2 weeks  -       Row Name 11/03/24 1217          Transfer Goal 1 (OT)    Activity/Assistive Device (Transfer Goal 1, OT) transfers, all  -     [place] Level/Cues Needed (Transfer Goal 1, OT) minimum assist (75% or more patient effort)  -     Time Frame (Transfer Goal 1, OT) short term goal (STG);2 weeks  -     Progress/Outcome (Transfer Goal 1, OT) new goal  -       Row Name 11/03/24 1217          Dressing Goal 1 (OT)    Activity/Device (Dressing Goal 1, OT) dressing skills, all;upper body dressing;lower body dressing  -     [place]/Cues Needed (Dressing Goal 1, OT) minimum assist (75% or more patient effort)  -     Time Frame (Dressing Goal 1, OT) short term goal (STG);2 weeks  -     Progress/Outcome (Dressing Goal 1, OT) new goal  -       Row Name 11/03/24 1217          Toileting Goal 1 (OT)    Activity/Device (Toileting Goal 1, OT) toileting skills, all  -     [place] Level/Cues Needed (Toileting Goal 1, OT) minimum assist (75% or more patient effort)  -     Time Frame (Toileting Goal 1, OT) short term goal (STG);2 weeks  -     Progress/Outcome (Toileting Goal 1, OT) new goal  -       Row Name 11/03/24 1217          Grooming Goal 1 (OT)    Activity/Device (Grooming Goal 1, OT) grooming skills, all  -     [place] (Grooming Goal 1, OT) modified independence  -     Time Frame (Grooming Goal 1, OT) short term goal (STG);2 weeks  -     Progress/Outcome (Grooming Goal 1, OT) new goal  -       Row Name 11/03/24 1217          Therapy Assessment/Plan (OT)    Planned Therapy Interventions (OT) activity tolerance training;BADL retraining;functional balance  retraining;occupation/activity based interventions;patient/caregiver education/training;strengthening exercise;transfer/mobility retraining  -               User Key  (r) = Recorded By, (t) = Taken By, (c) = Cosigned By      Initials Name Provider Type    Evelyn Goldstein OT Occupational Therapist                   Clinical Impression       Row Name 11/03/24 120          Pain Assessment    Pain Location extremity  -     Pain Side/Orientation right;upper  -LH     Pre/Posttreatment Pain Comment did not rate pain  -       Row Name 11/03/24 0534          Plan of Care Review    Outcome Evaluation Patient is an 84 year old female with malignant neoplasm metastatic cancer in R humerus, s/p ORIF on 10/31/24. Patient states she wants to go to the bathroom, is very weak and ill looking in bed. She requires max A to get to EOB from supine position, able to initially sit at EOB with SBA/CGA. Patient performed sit to stand from EOB with mod A. able to take steps to bathroom but very shaky, with BLE's quivering with WB. Nursing aid present and assists with mobility to bathroom, requiring max Ax2 to get to toilet. After toileting, patient requires max A with all aspects of toileting. Upon standing, patient reports feeling faint, became weak, and had to sit back down on toilet, felt she was going to pass out. She required max Ax2 to get off of toilet, leaned onto nuring aid for support in standing and required max Ax2, for toilet to recliner chair squat pivot transfer. Patient, son and daughter present at visit today, had planned to take patient home, with elderly spouse as primary caregiver. Patient is unable to participate in ADLs at current level, instructed family that she may need bed changes at times. Son expresses concern of patient's lack of nutrition, feels she may eat more at home. OT recommends SNF placement at this time, as she is unable to tolerate minimal exertion for participation in self care. Son states they  do not have a bedside commode or wheelchair at home, and OT concerned that patient would be bedbound at home in current condition. Son states her spouse has foot drop, so he likely cannot provide mobility support that the patient currently needs.  -       Row Name 11/03/24 1207          Therapy Assessment/Plan (OT)    Rehab Potential (OT) limited  -     Criteria for Skilled Therapeutic Interventions Met (OT) yes;skilled treatment is necessary  -     Therapy Frequency (OT) 5 times/wk  -       Row Name 11/03/24 1207          Therapy Plan Review/Discharge Plan (OT)    Anticipated Discharge Disposition (OT) skilled nursing facility  -       Row Name 11/03/24 1207          Positioning and Restraints    Pre-Treatment Position in bed  -     Post Treatment Position chair  -     In Chair reclined;call light within reach;encouraged to call for assist;exit alarm on;with family/caregiver;notified nsg  -               User Key  (r) = Recorded By, (t) = Taken By, (c) = Cosigned By      Initials Name Provider Type    Evelyn Goldstein OT Occupational Therapist                   Outcome Measures       Row Name 11/03/24 1218          How much help from another is currently needed...    Putting on and taking off regular lower body clothing? 1  -LH     Bathing (including washing, rinsing, and drying) 1  -LH     Toileting (which includes using toilet bed pan or urinal) 1  -LH     Putting on and taking off regular upper body clothing 1  -     Taking care of personal grooming (such as brushing teeth) 1  -LH     Eating meals 2  -     AM-PAC 6 Clicks Score (OT) 7  -       Row Name 11/03/24 1218          Functional Assessment    Outcome Measure Options AM-PAC 6 Clicks Daily Activity (OT)  -               User Key  (r) = Recorded By, (t) = Taken By, (c) = Cosigned By      Initials Name Provider Type    Evelyn Goldstein OT Occupational Therapist                    Occupational Therapy Education       Title: PT OT SLP  Therapies (Done)       Topic: Occupational Therapy (Done)       Point: ADL training (Done)       Description:   Instruct learner(s) on proper safety adaptation and remediation techniques during self care or transfers.   Instruct in proper use of assistive devices.                  Learning Progress Summary            Patient Acceptance, E,TB, VU by  at 11/3/2024 1218    Comment: Instructed in role of OT, discharge planning, home safety, fall prevention, NWB restrictions, caregiver education                      Point: Home exercise program (Done)       Description:   Instruct learner(s) on appropriate technique for monitoring, assisting and/or progressing therapeutic exercises/activities.                  Learning Progress Summary            Patient Acceptance, E,TB, VU by  at 11/3/2024 1218    Comment: Instructed in role of OT, discharge planning, home safety, fall prevention, NWB restrictions, caregiver education                      Point: Precautions (Done)       Description:   Instruct learner(s) on prescribed precautions during self-care and functional transfers.                  Learning Progress Summary            Patient Acceptance, E,TB, VU by  at 11/3/2024 1218    Comment: Instructed in role of OT, discharge planning, home safety, fall prevention, NWB restrictions, caregiver education                      Point: Body mechanics (Done)       Description:   Instruct learner(s) on proper positioning and spine alignment during self-care, functional mobility activities and/or exercises.                  Learning Progress Summary            Patient Acceptance, E,TB, VU by  at 11/3/2024 1218    Comment: Instructed in role of OT, discharge planning, home safety, fall prevention, NWB restrictions, caregiver education                                      User Key       Initials Effective Dates Name Provider Type Discipline     08/31/23 -  Evelyn Moreno OT Occupational Therapist OT                  OT  Recommendation and Plan  Planned Therapy Interventions (OT): activity tolerance training, BADL retraining, functional balance retraining, occupation/activity based interventions, patient/caregiver education/training, strengthening exercise, transfer/mobility retraining  Therapy Frequency (OT): 5 times/wk  Plan of Care Review  Outcome Evaluation: Patient is an 84 year old female with malignant neoplasm metastatic cancer in R humerus, s/p ORIF on 10/31/24. Patient states she wants to go to the bathroom, is very weak and ill looking in bed. She requires max A to get to EOB from supine position, able to initially sit at EOB with SBA/CGA. Patient performed sit to stand from EOB with mod A. able to take steps to bathroom but very shaky, with BLE's quivering with WB. Nursing aid present and assists with mobility to bathroom, requiring max Ax2 to get to toilet. After toileting, patient requires max A with all aspects of toileting. Upon standing, patient reports feeling faint, became weak, and had to sit back down on toilet, felt she was going to pass out. She required max Ax2 to get off of toilet, leaned onto nuring aid for support in standing and required max Ax2, for toilet to recliner chair squat pivot transfer. Patient, son and daughter present at visit today, had planned to take patient home, with elderly spouse as primary caregiver. Patient is unable to participate in ADLs at current level, instructed family that she may need bed changes at times. Son expresses concern of patient's lack of nutrition, feels she may eat more at home. OT recommends SNF placement at this time, as she is unable to tolerate minimal exertion for participation in self care. Son states they do not have a bedside commode or wheelchair at home, and OT concerned that patient would be bedbound at home in current condition. Son states her spouse has foot drop, so he likely cannot provide mobility support that the patient currently needs.     Time  Calculation:   Evaluation Complexity (OT)  Review Occupational Profile/Medical/Therapy History Complexity: expanded/moderate complexity  Assessment, Occupational Performance/Identification of Deficit Complexity: 3-5 performance deficits  Clinical Decision Making Complexity (OT): detailed assessment/moderate complexity  Overall Complexity of Evaluation (OT): moderate complexity     Time Calculation- OT       Row Name 11/03/24 1220             Time Calculation- OT    OT Start Time 1010  -      OT Stop Time 1100  -      OT Time Calculation (min) 50 min  -      Total Timed Code Minutes- OT 40 minute(s)  -      OT Non-Billable Time (min) 10 min  -      OT Received On 11/03/24  -      OT - Next Appointment 11/04/24  -      OT Goal Re-Cert Due Date 11/17/24  -         Timed Charges    37281 - OT Self Care/Mgmt Minutes 40  -LH         Untimed Charges    OT Eval/Re-eval Minutes 10  -LH         Total Minutes    Timed Charges Total Minutes 40  -LH      Untimed Charges Total Minutes 10  -LH       Total Minutes 50  -LH                User Key  (r) = Recorded By, (t) = Taken By, (c) = Cosigned By      Initials Name Provider Type     Evelyn Moreno, OT Occupational Therapist                  Therapy Charges for Today       Code Description Service Date Service Provider Modifiers Qty    14277753695 HC OT SELF CARE/MGMT/TRAIN EA 15 MIN 11/3/2024 Evelyn Moreno OT GO 3    51885862963 HC OT EVAL MOD COMPLEXITY 1 11/3/2024 Evelyn Moreno OT GO 1                 Evelyn Moreno OT  11/3/2024

## 2024-11-03 NOTE — SIGNIFICANT NOTE
11/03/24 1352   OTHER   Discipline physical therapist   Rehab Time/Intention   Session Not Performed other (see comments)  (Pt to hold treatment this date. Per nsg, OT completed eval this afternoon during which, pt became faint and t/f from toilet to chair with assist of nsg aide. Pt resting in chair at this time and PT will follow up tomorrow.)   Recommendation   PT - Next Appointment 11/04/24

## 2024-11-04 PROBLEM — E43 SEVERE MALNUTRITION: Status: ACTIVE | Noted: 2024-11-04

## 2024-11-04 LAB
ANION GAP SERPL CALCULATED.3IONS-SCNC: 7.2 MMOL/L (ref 5–15)
BUN SERPL-MCNC: 34 MG/DL (ref 8–23)
BUN/CREAT SERPL: 46.6 (ref 7–25)
CALCIUM SPEC-SCNC: 8.6 MG/DL (ref 8.6–10.5)
CHLORIDE SERPL-SCNC: 99 MMOL/L (ref 98–107)
CO2 SERPL-SCNC: 21.8 MMOL/L (ref 22–29)
CORTIS SERPL-MCNC: 11.4 MCG/DL
CREAT SERPL-MCNC: 0.73 MG/DL (ref 0.57–1)
DEPRECATED RDW RBC AUTO: 36.7 FL (ref 37–54)
EGFRCR SERPLBLD CKD-EPI 2021: 82.2 ML/MIN/1.73
ERYTHROCYTE [DISTWIDTH] IN BLOOD BY AUTOMATED COUNT: 13.1 % (ref 12.3–15.4)
GLUCOSE SERPL-MCNC: 99 MG/DL (ref 65–99)
HCT VFR BLD AUTO: 33.4 % (ref 34–46.6)
HGB BLD-MCNC: 10.9 G/DL (ref 12–15.9)
LAB AP CASE REPORT: NORMAL
Lab: NORMAL
MAGNESIUM SERPL-MCNC: 2.2 MG/DL (ref 1.6–2.4)
MCH RBC QN AUTO: 25.2 PG (ref 26.6–33)
MCHC RBC AUTO-ENTMCNC: 32.6 G/DL (ref 31.5–35.7)
MCV RBC AUTO: 77.3 FL (ref 79–97)
PATH REPORT.ADDENDUM SPEC: NORMAL
PATH REPORT.FINAL DX SPEC: NORMAL
PATH REPORT.GROSS SPEC: NORMAL
PHOSPHATE SERPL-MCNC: 2 MG/DL (ref 2.5–4.5)
PLATELET # BLD AUTO: 325 10*3/MM3 (ref 140–450)
PMV BLD AUTO: 10.1 FL (ref 6–12)
POTASSIUM SERPL-SCNC: 3.9 MMOL/L (ref 3.5–5.2)
POTASSIUM SERPL-SCNC: 5.4 MMOL/L (ref 3.5–5.2)
RBC # BLD AUTO: 4.32 10*6/MM3 (ref 3.77–5.28)
SODIUM SERPL-SCNC: 128 MMOL/L (ref 136–145)
TSH SERPL DL<=0.05 MIU/L-ACNC: 0.33 UIU/ML (ref 0.27–4.2)
WBC NRBC COR # BLD AUTO: 9.91 10*3/MM3 (ref 3.4–10.8)

## 2024-11-04 PROCEDURE — 84443 ASSAY THYROID STIM HORMONE: CPT | Performed by: STUDENT IN AN ORGANIZED HEALTH CARE EDUCATION/TRAINING PROGRAM

## 2024-11-04 PROCEDURE — 97535 SELF CARE MNGMENT TRAINING: CPT

## 2024-11-04 PROCEDURE — 25010000002 ENOXAPARIN PER 10 MG: Performed by: STUDENT IN AN ORGANIZED HEALTH CARE EDUCATION/TRAINING PROGRAM

## 2024-11-04 PROCEDURE — 82533 TOTAL CORTISOL: CPT | Performed by: HOSPITALIST

## 2024-11-04 PROCEDURE — 97530 THERAPEUTIC ACTIVITIES: CPT

## 2024-11-04 PROCEDURE — 83735 ASSAY OF MAGNESIUM: CPT | Performed by: STUDENT IN AN ORGANIZED HEALTH CARE EDUCATION/TRAINING PROGRAM

## 2024-11-04 PROCEDURE — 84132 ASSAY OF SERUM POTASSIUM: CPT | Performed by: STUDENT IN AN ORGANIZED HEALTH CARE EDUCATION/TRAINING PROGRAM

## 2024-11-04 PROCEDURE — 84100 ASSAY OF PHOSPHORUS: CPT | Performed by: STUDENT IN AN ORGANIZED HEALTH CARE EDUCATION/TRAINING PROGRAM

## 2024-11-04 PROCEDURE — 85027 COMPLETE CBC AUTOMATED: CPT | Performed by: STUDENT IN AN ORGANIZED HEALTH CARE EDUCATION/TRAINING PROGRAM

## 2024-11-04 PROCEDURE — 80048 BASIC METABOLIC PNL TOTAL CA: CPT | Performed by: STUDENT IN AN ORGANIZED HEALTH CARE EDUCATION/TRAINING PROGRAM

## 2024-11-04 RX ORDER — FAMOTIDINE 10 MG/ML
20 INJECTION, SOLUTION INTRAVENOUS ONCE
Status: COMPLETED | OUTPATIENT
Start: 2024-11-04 | End: 2024-11-04

## 2024-11-04 RX ORDER — MORPHINE SULFATE 2 MG/ML
2 INJECTION, SOLUTION INTRAMUSCULAR; INTRAVENOUS ONCE
Status: COMPLETED | OUTPATIENT
Start: 2024-11-05 | End: 2024-11-05

## 2024-11-04 RX ADMIN — ACETAMINOPHEN 1000 MG: 500 TABLET ORAL at 20:13

## 2024-11-04 RX ADMIN — ACETAMINOPHEN 1000 MG: 500 TABLET ORAL at 12:31

## 2024-11-04 RX ADMIN — TRAMADOL HYDROCHLORIDE 50 MG: 50 TABLET, COATED ORAL at 07:29

## 2024-11-04 RX ADMIN — PANTOPRAZOLE SODIUM 40 MG: 40 TABLET, DELAYED RELEASE ORAL at 05:07

## 2024-11-04 RX ADMIN — TRAMADOL HYDROCHLORIDE 50 MG: 50 TABLET, COATED ORAL at 14:04

## 2024-11-04 RX ADMIN — SODIUM ZIRCONIUM CYCLOSILICATE 10 G: 10 POWDER, FOR SUSPENSION ORAL at 09:33

## 2024-11-04 RX ADMIN — DIBASIC SODIUM PHOSPHATE, MONOBASIC POTASSIUM PHOSPHATE AND MONOBASIC SODIUM PHOSPHATE 2 TABLET: 852; 155; 130 TABLET ORAL at 20:13

## 2024-11-04 RX ADMIN — SENNOSIDES AND DOCUSATE SODIUM 2 TABLET: 50; 8.6 TABLET ORAL at 20:13

## 2024-11-04 RX ADMIN — ACETAMINOPHEN 1000 MG: 500 TABLET ORAL at 05:07

## 2024-11-04 RX ADMIN — DIBASIC SODIUM PHOSPHATE, MONOBASIC POTASSIUM PHOSPHATE AND MONOBASIC SODIUM PHOSPHATE 2 TABLET: 852; 155; 130 TABLET ORAL at 09:33

## 2024-11-04 RX ADMIN — FAMOTIDINE 20 MG: 10 INJECTION INTRAVENOUS at 09:33

## 2024-11-04 RX ADMIN — TRAMADOL HYDROCHLORIDE 50 MG: 50 TABLET, COATED ORAL at 20:13

## 2024-11-04 RX ADMIN — ESCITALOPRAM 20 MG: 20 TABLET, FILM COATED ORAL at 09:33

## 2024-11-04 RX ADMIN — ENOXAPARIN SODIUM 40 MG: 100 INJECTION SUBCUTANEOUS at 20:12

## 2024-11-04 NOTE — CONSULTS
Check morning cortisol  [facility] Consult Note      Patient Name: Madelyn Madrid  : 1940  MRN: [id number]  Primary Care Physician:  Therese Martinez MD  Referring Physician: Elmo Altman MD  Date of admission: 10/31/2024    Subjective     Reason for Consult:  hyponatremia     HPI:   Madelyn Madrid is a 84 y.o. 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  Her hospital course was complicated by poor oral intake and generalized fatigue with worsening sodium level with sodium down to 128 from 135 on admission.    Review of Systems:   14 point review of systems is otherwise negative except for mentioned above on HPI    Personal History     Past Medical History:   Diagnosis Date    Arthritis     COPD (chronic obstructive pulmonary disease) 10/2/24    CT scan    Depression     Emphysema of lung     CT scan    Endometrial cancer     GERD (gastroesophageal reflux disease)     Goiter     [place] (hard of hearing)     Hx of radiation therapy     GOES 5 TIMES A WEEK    Hypertension     Incontinence of urine     Lung mass     Pancreatitis        Past Surgical History:   Procedure Laterality Date    BRONCHOSCOPY WITH ION ROBOTIC ASSIST N/A 10/23/2024    Procedure: ROBOTIC BRONCHOSCOPY WITH FINE NEEDLE ASPIRATION AND CRYO BIOPSY, BRONCHOSCOPY WITH BRONCHOALVEOLAR LAVAGE;  Surgeon: Deann Iqbal MD;  Location: [facility] ENDOSCOPY;  Service: Robotics - Pulmonary;  Laterality: N/A;    COLONOSCOPY      HYSTERECTOMY      LAPAROSCOPY HYSTEROSCOPY ENDOMETRIAL ABLATION         Family History: family history includes Alzheimer's disease in her brother, mother, and sister; Cancer in her father and mother; Epilepsy in her brother; Rectal cancer in her mother.    Social History:  reports that she  quit smoking about 25 years ago. Her smoking use included cigarettes. She started smoking about 65 years ago. She has a 40 pack-year smoking history. She has been exposed to tobacco smoke. She has never used smokeless tobacco. She reports that she does not currently use alcohol. She reports that she does not use drugs.    Home Medications:  Prior to Admission medications    Medication Sig Start Date End Date Taking? Authorizing Provider   acetaminophen (Tylenol) 325 MG tablet Take 2 tablets by mouth Every 6 (Six) Hours As Needed for Mild Pain.   Yes Oscar Junior MD   amLODIPine (NORVASC) 10 MG tablet Take 1 tablet by mouth Daily. 6/29/24  Yes Oscar Junior MD   escitalopram (LEXAPRO) 20 MG tablet Take 1 tablet by mouth Every Morning. 6/29/24  Yes Oscar Junior MD   OMEPRAZOLE PO Take 20 mg by mouth Daily As Needed.   Yes Oscar Junior MD   traMADol (ULTRAM) 50 MG tablet Take 1 tablet by mouth Every 6 (Six) Hours As Needed for Moderate Pain. 10/9/24  Yes Urbano Ghosh MD   docusate sodium (COLACE) 250 MG capsule Take 1 capsule by mouth 2 (Two) Times a Day As Needed for Constipation. 10/31/24   Elmo Altman MD   HYDROcodone-acetaminophen (NORCO) 5-325 MG per tablet Take 1 tablet by mouth Every 4 (Four) Hours As Needed (Pain). 10/31/24   Elmo Altman MD   naloxone (NARCAN) 4 MG/0.1ML nasal spray Call 911. Don't prime. [place] in 1 nostril for overdose. Repeat in 2-3 minutes in other nostril if no or minimal breathing/responsiveness. 10/31/24   Elmo Altman MD   ondansetron (Zofran) 4 MG tablet Take 1 tablet by mouth Every 8 (Eight) Hours As Needed for Nausea or Vomiting. 10/31/24   Elmo Altman MD       Allergies:  Allergies   Allergen Reactions    Other Nausea And Vomiting and GI Intolerance     Opioids--       Objective     Vitals:   Temp:  [97.2 °F (36.2 °C)-99.7 °F (37.6 °C)] 98.1 °F (36.7 °C)  Heart Rate:  [] 96  Resp:  [16] 16  BP: (109-153)/(55-68)  124/68  Flow (L/min) (Oxygen Therapy):  [2] 2    Intake/Output Summary (Last 24 hours) at 11/4/2024 1718  Last data filed at 11/4/2024 1500  Gross per 24 hour   Intake 960 ml   Output 700 ml   Net 260 ml       Physical Exam:   Constitutional: Frail and ill looking  HEENT: Sclera anicteric, no conjunctival injection  Neck: Supple, no thyromegaly, no lymphadenopathy, trachea at midline, no JVD  Respiratory: Clear to auscultation bilaterally, nonlabored respiration  Cardiovascular: RRR, no murmurs, no rubs or gallops, no carotid bruit  Gastrointestinal: Positive bowel sounds, abdomen is soft, nontender and nondistended  : No palpable bladder  Musculoskeletal: Left arm wrapped  Psychiatric: Appropriate affect, cooperative  Neurologic: Oriented x3, moving all extremities, normal speech and mental status  Skin: Warm and dry       Scheduled Meds:     acetaminophen, 1,000 mg, Oral, Q8H  amLODIPine, 10 mg, Oral, Daily  enoxaparin, 40 mg, Subcutaneous, Nightly  [Held by provider] escitalopram, 20 mg, Oral, QAM  pantoprazole, 40 mg, Oral, Q AM  phosphorus, 500 mg, Oral, BID  polyethylene glycol, 17 g, Oral, BID AC  senna-docusate sodium, 2 tablet, Oral, BID      IV Meds:        Results Reviewed:   I have personally reviewed the results from the time of this admission to 11/4/2024 17:18 EST     Lab Results   Component Value Date    GLUCOSE 99 11/04/2024    CALCIUM 8.6 11/04/2024     (L) 11/04/2024    K 3.9 11/04/2024    CO2 21.8 (L) 11/04/2024    CL 99 11/04/2024    BUN 34 (H) 11/04/2024    CREATININE 0.73 11/04/2024    BCR 46.6 (H) 11/04/2024    ANIONGAP 7.2 11/04/2024      Lab Results   Component Value Date    MG 2.2 11/04/2024    PHOS 2.0 (L) 11/04/2024    ALBUMIN 2.4 (L) 11/03/2024           Assessment / Plan     ASSESSMENT:  Acute hyponatremia likely multifactorial secondary to poor solute intake in the setting of 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  Adrenal insufficiency and is on differential  diagnosis given hyperkalemia  Hyperkalemia resolved  History of HTN: Blood pressure is acceptable  Anemia  History of lung cancer with bone metastasis        PLAN:  Will place patient on fluid restriction of 1200 cc per 24 hours.  Will hold Lexapro  Check morning cortisol  Check urine sodium and urine osmolality  Sodium every 8 hours  Labs in a.m.      Thank you for involving us in the care of Madelyn Madrid.  Please feel free to call with any questions.    Baudilio Alfredo MD  11/04/24  17:18 [facility]    [facility]  [phone]    Parts of this note may be an electronic transcription/translation of spoken language to printed text using the Dragon dictation system.

## 2024-11-04 NOTE — PLAN OF CARE
Goal Outcome Evaluation:      No acute events. VSS. Room air. A&Ox3. [place]. Pt slept in chair per request throughout shift. Pain managed per MAR. Son at bedside throughout shift.

## 2024-11-04 NOTE — CONSULTS
Nutrition Services    Patient Name:  Madelyn Madrid  YOB: 1940  MRN: [id number]  Admit Date:  10/31/2024    Assessment Date:  11/04/24    Summary: Consult for Chronic Poor Intake    Pt is a 84 y.o. female with pulmonary emphysema by imaging, hypertension, GERD, adenocarcinoma of the lung with mets to the bone with a pathologic fracture of the right humerus who presented for elective right humeral intramedullary nail/darin insertion. Family is concerned pt is not eating enough and has no energy. Family present at bedside at time of visit. Po intake has been poor. Pt reports appetite is starting to improving. Family brought in soup which pt was able to eat 25% of. Pt is drinking her Boost supplements. Agreeable to trying Magic Cup daily with dinner to provide variety. Pt reports UBW of around 120 lbs and endorses wt loss. Daughter reports pt has been losing wt for a while. Wt hx reflects a 6.1% wt loss x 2 months. NFPE completed, consistent with nutrition diagnosis of malnutrition using AND/ASPEN criteria. See MSA below. Labs, skin, meds reviewed. Plan for dc to rehab.  Labs reviewed, Na 128    Plan/Recommend:  Addition of Magic Cup daily with dinner  Continue Boost BID  Encourage good po intake  Will continue to monitor intake, labs, wt    RD to follow.    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    Medical/Surgical History Past Medical History:   Diagnosis Date    Arthritis     COPD (chronic obstructive pulmonary disease) 10/2/24    CT scan    Depression     Emphysema of lung 10_2_24    CT scan    Endometrial cancer     GERD (gastroesophageal reflux disease)     Goiter     [place] (hard of hearing)     Hx of radiation therapy     GOES 5 TIMES A WEEK    Hypertension     Incontinence of urine     Lung mass     Pancreatitis        Past Surgical History:  "  Procedure Laterality Date    BRONCHOSCOPY WITH ION ROBOTIC ASSIST N/A 10/23/2024    Procedure: ROBOTIC BRONCHOSCOPY WITH FINE NEEDLE ASPIRATION AND CRYO BIOPSY, BRONCHOSCOPY WITH BRONCHOALVEOLAR LAVAGE;  Surgeon: Deann Iqbal MD;  Location: [facility] ENDOSCOPY;  Service: Robotics - Pulmonary;  Laterality: N/A;    COLONOSCOPY      HYSTERECTOMY      LAPAROSCOPY HYSTEROSCOPY ENDOMETRIAL ABLATION          Anthropometrics        Current Height  Current Weight  BMI kg/m2 Height: 157.5 cm (62.01\")  Weight: 49 kg (108 lb 0.4 oz) (11/04/24 1049)  Body mass index is 19.75 kg/m².   Adjusted BMI (if applicable)    BMI Category Normal/Healthy (18.4 - 24.9)   Ideal Body Weight (IBW) 110 lbs   Usual Body Weight (UBW) 120 lbs per pt   Weight Trend Loss   Weight History Wt Readings from Last 30 Encounters:   11/04/24 1049 49 kg (108 lb 0.4 oz)   10/30/24 1230 49 kg (108 lb)   10/28/24 1351 49.4 kg (109 lb)   10/23/24 0735 50 kg (110 lb 3.7 oz)   10/17/24 0920 49.9 kg (110 lb)   10/21/24 1343 49.9 kg (110 lb)   10/16/24 1325 50.3 kg (111 lb)   10/09/24 1343 49.7 kg (109 lb 9.6 oz)   09/30/24 1432 49.9 kg (110 lb)   09/25/24 1317 49.6 kg (109 lb 6.4 oz)   09/16/24 1440 52.2 kg (115 lb)   08/15/24 1448 52.2 kg (115 lb)      --  Labs       Pertinent Labs    Results from last 7 days   Lab Units 11/04/24  0324 11/03/24  1154 11/03/24  0353 11/02/24  0806 11/01/24  0835 10/31/24  2017   SODIUM mmol/L 128* 129* 130* 133*   < > 135*   POTASSIUM mmol/L 5.4*  --  4.5 4.7   < > 3.3*   CHLORIDE mmol/L 99  --  98 103   < > 95*   CO2 mmol/L 21.8*  --  20.2* 19.0*   < > 23.6   BUN mg/dL 34*  --  33* 31*   < > 10   CREATININE mg/dL 0.73  --  0.84 0.85   < > 0.78   CALCIUM mg/dL 8.6  --  8.7 7.7*   < > 9.0   BILIRUBIN mg/dL  --   --  0.4  --   --  0.4   ALK PHOS U/L  --   --  78  --   --  102   ALT (SGPT) U/L  --   --  7  --   --  11   AST (SGOT) U/L  --   --  27  --   --  47*   GLUCOSE mg/dL 99  --  95 132*   < > 146*    < > = values in this " "interval not displayed.     Results from last 7 days   Lab Units 11/04/24  0324 11/03/24 0353 11/02/24 0806 11/01/24 0835   MAGNESIUM mg/dL 2.2  --  2.0 1.9   PHOSPHORUS mg/dL 2.0*  --  3.2 4.0   HEMOGLOBIN g/dL 10.9* 11.8* 12.8 14.4   HEMATOCRIT % 33.4* 36.3 39.2 44.0   WBC 10*3/mm3 9.91 11.09* 11.17* 10.90*   ALBUMIN g/dL  --  2.4* 2.7*  --      Results from last 7 days   Lab Units 11/04/24  0324 11/03/24  0353 11/02/24  0806 11/01/24  0835 10/31/24  2017   PLATELETS 10*3/mm3 325 304 304 417 382     No results found for: \"COVID19\"  No results found for: \"HGBA1C\"       Medications           Scheduled Medications acetaminophen, 1,000 mg, Oral, Q8H  amLODIPine, 10 mg, Oral, Daily  enoxaparin, 40 mg, Subcutaneous, Nightly  escitalopram, 20 mg, Oral, QAM  pantoprazole, 40 mg, Oral, Q AM  phosphorus, 500 mg, Oral, BID  polyethylene glycol, 17 g, Oral, BID AC  senna-docusate sodium, 2 tablet, Oral, BID       Infusions     PRN Medications   [DISCONTINUED] senna-docusate sodium **AND** [DISCONTINUED] polyethylene glycol **AND** bisacodyl **AND** bisacodyl    hydrOXYzine    ipratropium-albuterol    ondansetron ODT **OR** ondansetron    Potassium Replacement - Follow Nurse / BPA Driven Protocol    traMADol     Physical Findings          General Findings alert, anxious, oriented, room air   Oral/Mouth Cavity tooth or teeth missing   Edema  2+ (mild)   Gastrointestinal last bowel movement: 11/4   Skin  pale, location of wound: right upper arm   Tubes/Drains/Lines none   NFPE See Malnutrition Severity Assessment     Malnutrition Severity Assessment      Patient meets criteria for : (P) Severe Malnutrition  Malnutrition Type (Last 8 Hours)       Malnutrition Severity Assessment       Row Name 11/04/24 1641       Malnutrition Severity Assessment    Malnutrition Type Chronic Disease - Related Malnutrition (P)       Row Name 11/04/24 1641       Insufficient Energy Intake     Insufficient Energy Intake Findings Severe (P)     " Insufficient Energy Intake  <75% of est. energy requirement for > or equal to 1 month (P)       Row Name 11/04/24 1641       Unintentional Weight Loss     Unintentional Weight Loss Findings Severe (P)   6.1% x 2 months    Unintentional Weight Loss  Weight loss greater than 5% in one month (P)       Row Name 11/04/24 1641       Muscle Loss    Loss of Muscle Mass Findings Moderate (P)     Jew Region Moderate - slight depression (P)     Clavicle Bone Region Moderate - some protrusion in females, visible in males (P)     Acromion Bone Region Moderate - acromion may slightly protrude (P)     Dorsal Hand Region Moderate - slight depression (P)       Row Name 11/04/24 1641       Fat Loss    Subcutaneous Fat Loss Findings Moderate (P)     Orbital Region  Moderate -  somewhat hollowness, slightly dark circles (P)     Upper Arm Region Moderate - some fat tissue, not ample (P)       Row Name 11/04/24 1641       Criteria Met (Must meet criteria for severity in at least 2 of these categories: M Wasting, Fat Loss, Fluid, Secondary Signs, Wt. Status, Intake)    Patient meets criteria for  Severe Malnutrition (P)                        Current Nutrition Orders & Evaluation of Intake       Oral Nutrition     Food Allergies NKFA   Current PO Diet Diet: Regular/House, Renal; Low Potassium; Fluid Consistency: Thin (IDDSI 0)   Supplement Boost Plus, BID   PO Evaluation     % PO Intake 25%    Factors Affecting Intake: decreased appetite, weakness     Estimated Requirements         Weight used  49 kg    Calories  1470 - 1715 kcals (30-35 kcal/kg)    Protein  59 - 74 g (1.2 - 1.5 gm/kg)    Fluid  (1 mL/kcal)     PES STATEMENT / NUTRITION DIAGNOSIS      Nutrition Dx Problem  Problem: Malnutrition (severe)  Etiology: Medical Diagnosis - 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, COPD    Signs/Symptoms: Report of Minimal PO Intake, NFPE Results, Unintended Weight Change, and Report/Observation      NUTRITION INTERVENTION / PLAN OF CARE      Intervention Goal(s) Reduce/improve symptoms, Meet estimated needs, Disease management/therapy, Tolerate PO , Increase intake, Accepts oral nutrition supplement, No significant weight loss, and PO intake goal %: 75%         RD Intervention/Action Interview for preferences, Supplement offered/declined, Encourage intake, Continue to monitor, and Care plan reviewed   --      Prescription/Orders:       PO Diet       Supplements Boost Plus BID  Magic Cup daily with dinner      Enteral Nutrition       Parenteral Nutrition    New Prescription Ordered? Yes   --      Monitor/Evaluation Per protocol   Discharge Plan/Needs Pending clinical course   --    RD to follow per protocol.      Electronically signed by:  Zara Antoine  11/04/24 11:51 EST

## 2024-11-04 NOTE — PLAN OF CARE
Problem: Malnutrition  Goal: Improved Nutritional Intake  Outcome: Progressing     Problem: Oral Intake Inadequate  Goal: Improved Oral Intake  Outcome: Progressing   Goal Outcome Evaluation:                 Will follow.

## 2024-11-04 NOTE — DISCHARGE PLACEMENT REQUEST
"Kaitlin Madrid (84 y.o. Female)       Date of Birth   1940    Social Security Number       [place]   [facility] [place] [address]    Home Phone   [phone]    MRN   [id number]       Anabaptism   None    Marital Status                               Admission Date   10/31/24    Admission Type   Elective    Admitting Provider   Elmo Altman MD    Attending Provider   Miles Dietrich MD    Department, Room/Bed   [address], P782/1       Discharge Date       Discharge Disposition       Discharge Destination                                 Attending Provider: Miles Dietrich MD    Allergies: Other    Isolation: None   Infection: None   Code Status: CPR    Ht: 157.5 cm (62.01\")   Wt: 49 kg (108 lb 0.4 oz)    Admission Cmt: None   Principal Problem: 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 [C79.51,C80.1]                   Active Insurance as of 10/31/2024       Primary Coverage       Payor Plan Insurance Group Employer/Plan Group    MEDICARE MEDICARE A & B        Payor Plan Address Payor Plan Phone Number Payor Plan Fax Number Effective Dates    [address] [phone]  8/1/2007 - None Entered    [facility] [id number]         Subscriber Name Subscriber Birth Date Member ID       KAITLIN MADRID 1940 [id number]               Secondary Coverage       Payor Plan Insurance Group Employer/Plan Group    [facility] SU[employer] OPTIONS        Payor Plan Address Payor Plan Phone Number Payor Plan Fax Number Effective Dates    [facility] [phone]  1/1/2023 - None Entered    [address]       [facility] [address]         Subscriber Name Subscriber Birth Date Member ID       KAITLIN MADRID 1940 [id number]                     Emergency Contacts        (Rel.) Home Phone Work Phone Mobile Phone    Juliuschristian nelson (Spouse) [phone] -- [phone]    KIADEIRDRE (Daughter) -- " -- [phone]

## 2024-11-04 NOTE — PROGRESS NOTES
Discharge Planning Assessment  [facility]     Patient Name: Madelyn Madrid  MRN: [id number]  Today's Date: 11/4/2024    Admit Date: 10/31/2024    Plan: skilled rehab 1st choice [facility], 2nd choice [facility], 3rd choice [facility] and 4th [facility]   Discharge Needs Assessment       Row Name 11/04/24 1325       Living Environment    People in Home spouse    Name(s) of People in Home Adam Madrid spouse [phone]    Current Living Arrangements home    Primary Care Provided by spouse/significant other    Family Caregiver if Needed spouse    Family Caregiver Names Adam Madrid spouse [phone]    Quality of Family Relationships helpful;involved;supportive    Able to Return to Prior Arrangements no       Resource/Environmental Concerns    Transportation Concerns none       Transition Planning    Patient/Family Anticipates Transition to other (see comments)  skilled rehab    Transportation Anticipated family or friend will provide       Discharge Needs Assessment    Concerns to be Addressed discharge planning    Equipment Needed After Discharge none    Outpatient/Agency/Support Group Needs skilled nursing facility    Discharge Facility/Level of Care Needs nursing facility, skilled    Provided Post Acute Provider List? Yes    Post Acute Provider List Nursing Home    Provided Post Acute Provider Quality & Resource List? Yes    Post Acute Provider Quality and Resource List Nursing Home    Delivered To Support Person    Support Person spouse Adam Madrid [phone]    Method of Delivery In person    Offered/Gave Vendor List yes    Patient's Choice of Community Agency(s) skilled rehab                   Discharge Plan       Row Name 11/04/24 1332       Plan    Plan skilled rehab 1st choice [facility], 2nd choice [facility], 3rd choice [facility] and 4th [facility]    Plan Comments Spoke with spouse Adam Madrid [phone] at bedside for discharge planning, face  sheet verified. Spouse stated that he was helping her at home with ADL's, spouse stated she does not use any medical equipment at home. Her daughter and 2 sons lives close and are able to help her as needs. Provided spouse (patient choice) list he choose the rehabs from the list. Referrals have been placed in epic. Will need packet and pharmacy updated. Venktaesh HOLGUIN                  Continued Care and Services - Admitted Since 10/31/2024       Destination       Service Provider Request Status Services Address Phone Fax Patient Preferred    [facility] Pending - Request Sent -- [address] [place] [address] [phone] --    [facility] Pending - Request Sent -- [address] [place] [address] [phone] --    [facility] Pending - Request Sent -- [address] [place] [address] [phone] --    [facility] Pending - Request Sent -- [address] [place] [address] [phone] --                  Expected Discharge Date and Time       Expected Discharge Date Expected Discharge Time    Oct 31, 2024            Demographic Summary       Row Name 11/04/24 1330       Contact Information    Permission Granted to Share Info With family/designee      Row Name 11/04/24 1324       General Information    Admission Type inpatient    Arrived From emergency department    Referral Source admission list    Reason for Consult discharge planning    Preferred Language English                   Functional Status       Row Name 11/04/24 1330       Functional Status    Usual Activity Tolerance fair    Current Activity Tolerance poor       Functional Status, IADL    Medications assistive person    Meal Preparation completely dependent    Housekeeping completely dependent    Laundry completely dependent    Shopping completely dependent                   Psychosocial    No  documentation.                  Abuse/Neglect    No documentation.                  Legal    No documentation.                  Substance Abuse    No documentation.                  Patient Forms    No documentation.                     Silvia Gatica RN

## 2024-11-04 NOTE — PROGRESS NOTES
Name: Madelyn Madrid ADMIT: 10/31/2024   : 1940  PCP: Therese Martinez MD    MRN: [id number] LOS: 1 days   AGE/SEX: 84 y.o. female  ROOM: [facility]     Subjective   Subjective   Patient seen this morning, daughter, spouse present at bedside.  Tremors much better.  Patient complains of reflux.  Pain controlled.  Had BM yesterday.  Spouse agreeable for rehab now.      Review of Systems   As above  Objective   Objective   Vital Signs  Temp:  [98.2 °F (36.8 °C)-99.7 °F (37.6 °C)] 99.7 °F (37.6 °C)  Heart Rate:  [] 103  Resp:  [16] 16  BP: (109-153)/(53-82) 109/55  SpO2:  [91 %-96 %] 95 %  on  Flow (L/min) (Oxygen Therapy):  [2] 2;   Device (Oxygen Therapy): room air  Body mass index is 19.75 kg/m².  Physical Exam    General: Alert, laying in bed, not in distress,, ill-appearing  HEENT: Normocephalic, atraumatic  CV: Tachycardic, regular rhythm  Lungs: CTA anteriorly, no wheezing  Abdomen: Soft, nontender, nondistended  Extremities: Right upper extremity sling in place, no significant edema    Results Review     I reviewed the patient's new clinical results.  Results from last 7 days   Lab Units 24  03224  0353 24  0806 24  0835   WBC 10*3/mm3 9.91 11.09* 11.17* 10.90*   HEMOGLOBIN g/dL 10.9* 11.8* 12.8 14.4   PLATELETS 10*3/mm3 325 304 304 417     Results from last 7 days   Lab Units 24  0324 24  1154 24  0353 24  0806 24  1159   SODIUM mmol/L 128* 129* 130* 133* 131*   POTASSIUM mmol/L 5.4*  --  4.5 4.7 4.8   CHLORIDE mmol/L 99  --  98 103 97*   CO2 mmol/L 21.8*  --  20.2* 19.0* 20.0*   BUN mg/dL 34*  --  33* 31* 20   CREATININE mg/dL 0.73  --  0.84 0.85 0.99   GLUCOSE mg/dL 99  --  95 132* 157*   Estimated Creatinine Clearance: 46 mL/min (by C-G formula based on SCr of 0.73 mg/dL).  Results from last 7 days   Lab Units 24  0353 24  0806 10/31/24  2017   ALBUMIN g/dL 2.4* 2.7* 3.5   BILIRUBIN mg/dL 0.4  --  0.4   ALK PHOS U/L 78  --   "102   AST (SGOT) U/L 27  --  47*   ALT (SGPT) U/L 7  --  11     Results from last 7 days   Lab Units 11/04/24  0324 11/03/24  0353 11/02/24  0806 11/01/24  1159 11/01/24  0835 10/31/24  2017   CALCIUM mg/dL 8.6 8.7 7.7* 8.6 9.2 9.0   ALBUMIN g/dL  --  2.4* 2.7*  --   --  3.5   MAGNESIUM mg/dL 2.2  --  2.0  --  1.9  --    PHOSPHORUS mg/dL 2.0*  --  3.2  --  4.0  --      No results found for: \"COVID19\"  No results found for: \"HGBA1C\", \"POCGLU\"        XR Chest 1 View  Narrative: XR CHEST 1 VW-     HISTORY: Female who is 84 years-old, hypoxia     TECHNIQUE: Frontal view of the chest     COMPARISON: 10/23/2024     FINDINGS: The heart is enlarged. Pulmonary vasculature is unremarkable.  Aorta is tortuous, calcified. Previous CT demonstrated medial right  lower lobe pulmonary mass is not well characterized on this exam. No  acute infiltrate, pleural effusion, or pneumothorax. Right hemidiaphragm  is elevated. Surgical change of the right humerus is noted, at the level  of previously demonstrated pathologic fracture.     Impression: No acute infiltrate. Cardiomegaly. Tortuous aorta. Follow-up  as clinically indicated.     This report was finalized on 10/31/2024 8:57 PM by Dr. Wilmer Cosme M.D on Workstation: [id number]     XR Humerus Right  Narrative: XR HUMERUS RIGHT-     INDICATIONS: Postoperative evaluation.     TECHNIQUE: 3 views of the right humerus     COMPARISON: 10/31/2024 at 1607 hours     FINDINGS:     Postsurgical change of the right humerus is noted, with radiodense  marker extending across the pathologic fracture of the proximal humeral  shaft. Cortical step-off at the medial aspect of the fracture is  measured at 6 mm. Surgical soft tissue gas is evident.        Impression:    Postsurgical changes.           This report was finalized on 10/31/2024 6:24 PM by Dr. Wilmer Cosme M.D on Workstation: [employer]     FL C Arm During Surgery  This procedure was auto-finalized with no dictation " required.  Peripheral Block  Evelyn Nicolas MD     10/31/2024  3:32 PM  Peripheral Block    Patient reassessed immediately prior to procedure    Patient location during procedure: holding area  Reason for block: at surgeon's request and post-op pain management  Performed by  Anesthesiologist: Evelyn Nicolas MD  Preanesthetic Checklist  Completed: patient identified, IV checked, site marked, risks and benefits   discussed, surgical consent, monitors and equipment checked, pre-op   evaluation and timeout performed  Prep:  Pt Position: supine  Sterile barriers:gloves  Prep: ChloraPrep  Patient monitoring: continuous pulse oximetry, blood pressure monitoring   and EKG  Procedure    Sedation: yes  Performed under: MAC  Guidance:ultrasound guided    ULTRASOUND INTERPRETATION.  Using ultrasound guidance a 20 G gauge needle   was placed in close proximity to the brachial plexus nerve, at which   point, under ultrasound guidance anesthetic was injected in the area of   the nerve and spread of the anesthesia was seen on ultrasound in close   proximity thereto.  There were no abnormalities seen on ultrasound; a   digital image was taken; and the patient tolerated the procedure with no   complications. Images:still images obtained, printed/placed on chart    Laterality:right  Block Type:interscalene  Injection Technique:single-shot  Needle Type:echogenic  Needle Gauge:20 G    Medications Used: bupivacaine liposome (EXPAREL) 1.3 % injection -   Infiltration   10 mL - 10/31/2024 2:37:00 PM  bupivacaine PF (MARCAINE) 0.25 % injection - Injection   25 mL - 10/31/2024 2:36:00 PM    Medications  Comment:Ultrasound guidance was used to view and verify medication   disbursement.  Ultrasound guidance was used for needle placement.    Post Assessment  Injection Assessment: negative aspiration for heme, no paresthesia on   injection and incremental injection  Patient Tolerance:comfortable throughout block  Complications:no  Additional  Notes  Ultrasound guidance was used to guide needle placement, as well as to view   and verify medication disbursement.   Performed by: Evelyn Nicolas MD    Scheduled Medications  acetaminophen, 1,000 mg, Oral, Q8H  amLODIPine, 10 mg, Oral, Daily  enoxaparin, 40 mg, Subcutaneous, Nightly  escitalopram, 20 mg, Oral, QAM  famotidine, 20 mg, Intravenous, Once  pantoprazole, 40 mg, Oral, Q AM  phosphorus, 500 mg, Oral, BID  polyethylene glycol, 17 g, Oral, BID AC  senna-docusate sodium, 2 tablet, Oral, BID  sodium zirconium cyclosilicate, 10 g, Oral, Once    Infusions     Diet  Diet: Regular/House, Renal; Low Potassium; Fluid Consistency: Thin (IDDSI 0)    I have personally reviewed     [x]  Laboratory   []  Microbiology   []  Radiology   []  EKG/Telemetry  []  Cardiology/Vascular   []  Pathology    []  Records       Assessment/Plan     Active Hospital Problems    Diagnosis  POA    **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     Ms. Madrid is a 84 y.o. 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 Post operatively, she was confused and unable to weaned off of her oxygen, and so [facility] was asked to admit her to the hospital overnight.       Hypoxemia-  -minor. Likely related to anesthesia.   -Chest xray is negative.  ABG unremarkable.  -Weaned down to room air  -Encourage I-S-    Anxiety  -Continue as needed Atarax      Encephalopathy-  -likely toxic related to  anesthesia.  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  Discussed with family about port placement while she is in the hospital, they would like to defer for placement until she is discharged from rehab.    Hyponatremia  Sodium continues to trend down despite IV fluids  Hold further IV fluids.  Consult hematology oncology  -TSH    Generalized tremors  -Spouse reports she started having tremors few weeks ago following lung biopsy  -Continues to have significant tremors.  Partly suspect due to anxiety, initiated on as needed Atarax as above  -Neurology evaluated  -Tremors improved    Hyperkalemia  -Potassium 5.4.  Lokelma ordered, diet changed to low potassium.  Check potassium this afternoon  -Nephrology consult edas above    GERD  -Continue PPI.  -Complains of worsening reflux this morning, ordered one-time IV famotidine      SCDs for DVT prophylaxis.  Full code.  Discussed with patient and nursing staff.  Discussed with family  Disposition: SNF, likely stable to be discharged tomorrow if sodium improves and cleared by nephrology.  Expected Discharge Date: 10/31/2024; Expected Discharge Time:        Copied text in this note has been reviewed and is accurate as of 11/04/24.         Dictated utilizing Dragon dictation        Miles Dietrich MD  [facility]  11/04/24  09:11 EST

## 2024-11-04 NOTE — PLAN OF CARE
The pt participated in OT this morning. She was sleeping soundly in her bedside chair. She was agreeable to a OT/PT co-tx. Assistance was provided with sling management and she completed elbow->hand exercises when the sling was removed. Min A x2 to stand and mobilize into the bathroom for toileting. Total A provided for ema hygiene and brief management. She was able to mobilize in her room with Min A x2 with PT leading. She ended the session sitting in her bedside chair. SNF recommended.

## 2024-11-04 NOTE — PLAN OF CARE
"Goal Outcome Evaluation:  Plan of Care Reviewed With: patient           Outcome Evaluation: Upon entering room, pt. sitting up in chair, initially asleep but awakens with verbal stimuli, and is agreeable to work with P.T. this date despite c/o fatigue and pain. Pt. does report feeling \"better\" this date compared to yesterday. This AM, pt. able to ambulate 30 feet, Min. assist x 2, with HHA x 1 (LUE). Pt. requires Min. assist x 2 for sit <-> stand transfers. BLE ther. ex. program x 10 reps completed for general strengthening. Mild unsteadiness throughout upright mobility but no overt losses of balance noted. Overall improved tolerance to functional activity this date compared to last P.T. session with an increase in gait distance. Will continue to progress functional mobility as tolerated.                             "

## 2024-11-04 NOTE — THERAPY TREATMENT NOTE
Patient Name: Madelyn Madrid  : 1940    MRN: [id number]                              Today's Date: 2024       Admit Date: 10/31/2024    Visit Dx:     ICD-10-CM ICD-9-CM   1. 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    Surgery, elective    Hypertension    GERD (gastroesophageal reflux disease)    Depression    COPD (chronic obstructive pulmonary disease)     Past Medical History:   Diagnosis Date    Arthritis     COPD (chronic obstructive pulmonary disease) 10/2/24    CT scan    Depression     Emphysema of lung     CT scan    Endometrial cancer     GERD (gastroesophageal reflux disease)     Goiter     [place] (hard of hearing)     Hx of radiation therapy     GOES 5 TIMES A WEEK    Hypertension     Incontinence of urine     Lung mass     Pancreatitis      Past Surgical History:   Procedure Laterality Date    BRONCHOSCOPY WITH ION ROBOTIC ASSIST N/A 10/23/2024    Procedure: ROBOTIC BRONCHOSCOPY WITH FINE NEEDLE ASPIRATION AND CRYO BIOPSY, BRONCHOSCOPY WITH BRONCHOALVEOLAR LAVAGE;  Surgeon: Deann Iqbal MD;  Location: [facility] ENDOSCOPY;  Service: Robotics - Pulmonary;  Laterality: N/A;    COLONOSCOPY      HYSTERECTOMY      LAPAROSCOPY HYSTEROSCOPY ENDOMETRIAL ABLATION        General Information       Row Name 24 1231          OT Time and Intention    Subjective Information complains of;weakness;fatigue  -RB     Document Type therapy note (daily note)  -RB     Mode of Treatment co-treatment;occupational therapy;physical therapy  low pt activity tolerance  -RB     Patient Effort good  -RB       Row Name 24 1231          Cognition    Orientation Status (Cognition) oriented x 3  -RB                User Key  (r) = Recorded By, (t) = Taken By, (c) = Cosigned By      Initials Name Provider Type    Matilde Snider OT Occupational Therapist                     Mobility/ADL's       Row Name 11/04/24 1232          Bed Mobility    Comment, (Bed Mobility) up in her bedside chair this morning  -       Row Name 11/04/24 1232          Transfers    Transfers sit-stand transfer;stand-sit transfer;toilet transfer  -       Row Name 11/04/24 1232          Sit-Stand Transfer    Sit-Stand [place] (Transfers) minimum assist (75% patient effort);2 person assist;verbal cues  -RB     Comment, (Sit-Stand Transfer) a  -       Row Name 11/04/24 1232          Stand-Sit Transfer    Stand-Sit [place] (Transfers) minimum assist (75% patient effort);2 person assist;verbal cues  -RB     Comment, (Stand-Sit Transfer) [facility] Name 11/04/24 1232          Toilet Transfer    Type (Toilet Transfer) sit-stand;stand-sit  -RB     [place] Level (Toilet Transfer) minimum assist (75% patient effort);2 person assist;verbal cues  -RB     Comment, (Toilet Transfer) [facility] Name 11/04/24 1232          Functional Mobility    Functional Mobility- Ind. Level minimum assist (75% patient effort);2 person assist required;verbal cues required  -RB     Functional Mobility- Safety Issues balance decreased during turns  -RB     Functional Mobility- Comment a  [facility] Name 11/04/24 1232          Activities of Daily Living    BADL Assessment/Intervention toileting  -[facility] Name 11/04/24 1232          Toileting Assessment/Training    [place] Level (Toileting) toileting skills;dependent (less than 25% patient effort);change pad/brief;adjust/manage clothing  -RB     Assistive Devices (Toileting) commode  -RB     Position (Toileting) supported sitting;supported standing  -RB               User Key  (r) = Recorded By, (t) = Taken By, (c) = Cosigned By      Initials Name Provider Type    NURY Nunn  JUAN RAMON Clayton Occupational Therapist                   Obj/Interventions       Row Name 11/04/24 1233          Elbow/Forearm (Therapeutic Exercise)    Elbow/Forearm (Therapeutic Exercise) AAROM (active assistive range of motion)  -RB     Elbow/Forearm AAROM (Therapeutic Exercise) left;flexion;extension;supination;pronation;sitting  -RB       Row Name 11/04/24 1233          Wrist (Therapeutic Exercise)    Wrist (Therapeutic Exercise) AAROM (active assistive range of motion)  -RB     Wrist AAROM (Therapeutic Exercise) left;flexion;extension;10 repetitions  -RB       Row Name 11/04/24 1233          Hand (Therapeutic Exercise)    Hand (Therapeutic Exercise) AROM (active range of motion)  -RB     Hand AROM/AAROM (Therapeutic Exercise) left;AROM (active range of motion);finger flexion;finger extension;10 repetitions  -RB       Row Name 11/04/24 1233          Motor Skills    Therapeutic Exercise elbow/forearm;hand;wrist  -RB       Row Name 11/04/24 1233          Balance    Comment, Balance Min A x2 via A  -RB               User Key  (r) = Recorded By, (t) = Taken By, (c) = Cosigned By      Initials Name Provider Type    RB Matilde Nunn OT Occupational Therapist                   Goals/Plan    No documentation.                  Clinical Impression       Row Name 11/04/24 1234          Pain Assessment    Pretreatment Pain Rating 5/10  -RB     Posttreatment Pain Rating 5/10  -RB     Pain Location shoulder  -RB     Pain Side/Orientation right  -RB     Pain Management Interventions nursing notified;exercise or physical activity utilized  -RB       Row Name 11/04/24 1234          Plan of Care Review    Plan of Care Reviewed With patient  -RB     Progress improving  -RB     Outcome Evaluation The pt participated in OT this morning. She was sleeping soundly in her bedside chair. She was agreeable to a OT/PT co-tx. Assistance was provided with sling management and she completed elbow->hand exercises when the sling was  removed. Min A x2 to stand and mobilize into the bathroom for toileting. Total A provided for ema hygiene and brief management. She was able to mobilize in her room with Min A x2 with PT leading. She ended the session sitting in her bedside chair. SNF recommended.  -RB       Row Name 11/04/24 1234          Therapy Assessment/Plan (OT)    Rehab Potential (OT) good  -RB     Criteria for Skilled Therapeutic Interventions Met (OT) yes;skilled treatment is necessary  -RB     Therapy Frequency (OT) 5 times/wk  -RB       Row Name 11/04/24 1234          Therapy Plan Review/Discharge Plan (OT)    Anticipated Discharge Disposition (OT) skilled nursing facility  -RB       Row Name 11/04/24 1234          Positioning and Restraints    Pre-Treatment Position sitting in chair/recliner  -RB     Post Treatment Position chair  -RB     In Chair notified nsg;reclined;sitting;encouraged to call for assist;exit alarm on;call light within reach;legs elevated  -RB               User Key  (r) = Recorded By, (t) = Taken By, (c) = Cosigned By      Initials Name Provider Type    RB Matilde Nunn, OT Occupational Therapist                   Outcome Measures       Row Name 11/04/24 1004 11/04/24 1000       How much help from another person do you currently need...    Turning from your back to your side while in flat bed without using bedrails? 3  -MS 3  -EE    Moving from lying on back to sitting on the side of a flat bed without bedrails? 2  -MS 2  -EE    Moving to and from a bed to a chair (including a wheelchair)? 2  -MS 2  -EE    Standing up from a chair using your arms (e.g., wheelchair, bedside chair)? 2  -MS 2  -EE    Climbing 3-5 steps with a railing? 2  -MS 2  -EE    To walk in hospital room? 2  -MS 2  -EE    AM-PAC 6 Clicks Score (PT) 13  -MS 13  -EE    Highest Level of Mobility Goal 4 --> Transfer to chair/commode  -MS 4 --> Transfer to chair/commode  -EE      Row Name 11/04/24 1004          Functional Assessment    Outcome  Measure Options AM-PAC 6 Clicks Basic Mobility (PT)  -MS               User Key  (r) = Recorded By, (t) = Taken By, (c) = Cosigned By      Initials Name Provider Type    MS Hendricks Dwain LEAH, PT Physical Therapist    Nadia Salcedo RN Registered Nurse                    Occupational Therapy Education       Title: PT OT SLP Therapies (Done)       Topic: Occupational Therapy (Done)       Point: ADL training (Done)       Description:   Instruct learner(s) on proper safety adaptation and remediation techniques during self care or transfers.   Instruct in proper use of assistive devices.                  Learning Progress Summary            Patient Acceptance, E,TB, VU by  at 11/3/2024 1218    Comment: Instructed in role of OT, discharge planning, home safety, fall prevention, NWB restrictions, caregiver education                      Point: Home exercise program (Done)       Description:   Instruct learner(s) on appropriate technique for monitoring, assisting and/or progressing therapeutic exercises/activities.                  Learning Progress Summary            Patient Acceptance, E,TB, VU by  at 11/3/2024 1218    Comment: Instructed in role of OT, discharge planning, home safety, fall prevention, NWB restrictions, caregiver education                      Point: Precautions (Done)       Description:   Instruct learner(s) on prescribed precautions during self-care and functional transfers.                  Learning Progress Summary            Patient Acceptance, E,TB, VU by  at 11/3/2024 1218    Comment: Instructed in role of OT, discharge planning, home safety, fall prevention, NWB restrictions, caregiver education                      Point: Body mechanics (Done)       Description:   Instruct learner(s) on proper positioning and spine alignment during self-care, functional mobility activities and/or exercises.                  Learning Progress Summary            Patient Acceptance, E,TB, VU by  at  11/3/2024 1218    Comment: Instructed in role of OT, discharge planning, home safety, fall prevention, NWB restrictions, caregiver education                                      User Key       Initials Effective Dates Name Provider Type [facility] 08/31/23 -  Evelyn Moreno OT Occupational Therapist OT                  OT Recommendation and Plan  Therapy Frequency (OT): 5 times/wk  Plan of Care Review  Plan of Care Reviewed With: patient  Progress: improving  Outcome Evaluation: The pt participated in OT this morning. She was sleeping soundly in her bedside chair. She was agreeable to a OT/PT co-tx. Assistance was provided with sling management and she completed elbow->hand exercises when the sling was removed. Min A x2 to stand and mobilize into the bathroom for toileting. Total A provided for ema hygiene and brief management. She was able to mobilize in her room with Min A x2 with PT leading. She ended the session sitting in her bedside chair. SNF recommended.     Time Calculation:         Time Calculation- OT       Row Name 11/04/24 1230             Time Calculation- OT    OT Start Time 0905  -RB      OT Stop Time 0920  -RB      OT Time Calculation (min) 15 min  -RB      Total Timed Code Minutes- OT 15 minute(s)  -RB      OT Received On 11/04/24  -RB      OT - Next Appointment 11/05/24  -RB         Timed Charges    61206 - OT Self Care/Mgmt Minutes 15  -RB         Total Minutes    Timed Charges Total Minutes 15  -RB       Total Minutes 15  -RB                User Key  (r) = Recorded By, (t) = Taken By, (c) = Cosigned By      Initials Name Provider Type    RB Matilde Nunn OT Occupational Therapist                  Therapy Charges for Today       Code Description Service Date Service Provider Modifiers Qty    [id number]  OT SELF CARE/MGMT/TRAIN EA 15 MIN 11/4/2024 Matilde Nunn OT GO 1                 Matilde Nunn OT  11/4/2024

## 2024-11-04 NOTE — PLAN OF CARE
Goal Outcome Evaluation:  Plan of Care Reviewed With: patient   Vss, nvi, dressing c/d/I, voiding per pw/brp, bm this shift, ambulating assist x2, moderate swelling in RUE, sling and ice in use, prn tramadol and scheduled tylenol administered for pain with noted relief, plans to d/c to SNF, educated on bp meds and monitoring.     Progress: improving

## 2024-11-04 NOTE — THERAPY TREATMENT NOTE
Patient Name: Madelyn Madrid  : 1940    MRN: [id number]                              Today's Date: 2024       Admit Date: 10/31/2024    Visit Dx:     ICD-10-CM ICD-9-CM   1. 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    Surgery, elective    Hypertension    GERD (gastroesophageal reflux disease)    Depression    COPD (chronic obstructive pulmonary disease)     Past Medical History:   Diagnosis Date    Arthritis     COPD (chronic obstructive pulmonary disease) 10/2/24    CT scan    Depression     Emphysema of lung     CT scan    Endometrial cancer     GERD (gastroesophageal reflux disease)     Goiter     [place] (hard of hearing)     Hx of radiation therapy     GOES 5 TIMES A WEEK    Hypertension     Incontinence of urine     Lung mass     Pancreatitis      Past Surgical History:   Procedure Laterality Date    BRONCHOSCOPY WITH ION ROBOTIC ASSIST N/A 10/23/2024    Procedure: ROBOTIC BRONCHOSCOPY WITH FINE NEEDLE ASPIRATION AND CRYO BIOPSY, BRONCHOSCOPY WITH BRONCHOALVEOLAR LAVAGE;  Surgeon: Deann Iqbal MD;  Location: [facility] ENDOSCOPY;  Service: Robotics - Pulmonary;  Laterality: N/A;    COLONOSCOPY      HYSTERECTOMY      LAPAROSCOPY HYSTEROSCOPY ENDOMETRIAL ABLATION        General Information       Row Name 24 1001          Physical Therapy Time and Intention    Document Type therapy note (daily note)  -MS     Mode of Treatment physical therapy;occupational therapy;co-treatment  Activity limitation due to fatigue/weakness; Working on functional balance and strengthening while performing ADL's  -MS       Row Name 24 1001          General Information    Patient Profile Reviewed yes  -MS      Existing Precautions/Restrictions fall   Exit alarm;  NWB RUE;  RUE in sling  -MS     Barriers to Rehab none identified  -MS       Row Name 11/04/24 1001          Cognition    Orientation Status (Cognition) oriented x 3  -MS       Row Name 11/04/24 1001          Safety Issues/Impairments Affecting Functional Mobility    Comment, Safety Issues/Impairments (Mobility) Gait belt used for safety.  -MS               User Key  (r) = Recorded By, (t) = Taken By, (c) = Cosigned By      Initials Name Provider Type    Dwain Salazar, PT Physical Therapist                   Mobility       Row Name 11/04/24 1002          Bed Mobility    Comment, (Bed Mobility) Pt. up in chair this AM.  -MS       Row Name 11/04/24 1002          Sit-Stand Transfer    Sit-Stand [place] (Transfers) minimum assist (75% patient effort);2 person assist  -MS     Assistive Device (Sit-Stand Transfers) --  HHA x 1 LUE  -MS       Row Name 11/04/24 1002          Gait/Stairs (Locomotion)    [place] Level (Gait) minimum assist (75% patient effort);2 person assist  -MS     Assistive Device (Gait) --  HHA x 1 (LUE)  -MS     Distance in Feet (Gait) 30  -MS     Deviations/Abnormal Patterns (Gait) matty decreased;stride length decreased  -MS     Comment, (Gait/Stairs) Mild unsteadiness but no overt losses of balance.  Also assisted pt. in/out of the bathroom.  -MS               User Key  (r) = Recorded By, (t) = Taken By, (c) = Cosigned By      Initials Name Provider Type    Dwain Salazar PT Physical Therapist                   Obj/Interventions       Row Name 11/04/24 1003          Motor Skills    Therapeutic Exercise --  BLE ther. ex. program x 10 reps completed (Hip Flexion, LAQ's)  -MS               User Key  (r) = Recorded By, (t) = Taken By, (c) = Cosigned By      Initials Name Provider Type    Dwain Salazar, PT Physical Therapist                   Goals/Plan    No documentation.                  Clinical Impression       Row  Name 11/04/24 1004          Pain    Pretreatment Pain Rating 5/10  -MS     Posttreatment Pain Rating 5/10  -MS     Pain Location shoulder  -MS     Pain Side/Orientation right  -MS     Pain Management Interventions nursing notified;positioning techniques utilized;premedicated for activity  -MS       Row Name 11/04/24 1004          Positioning and Restraints    Pre-Treatment Position sitting in chair/recliner  -MS     Post Treatment Position chair  -MS     In Chair notified nsg;reclined;sitting;call light within reach;encouraged to call for assist;exit alarm on;with family/caregiver  -MS               User Key  (r) = Recorded By, (t) = Taken By, (c) = Cosigned By      Initials Name Provider Type    Dwain Salazar, PT Physical Therapist                   Outcome Measures       Row Name 11/04/24 1004          How much help from another person do you currently need...    Turning from your back to your side while in flat bed without using bedrails? 3  -MS     Moving from lying on back to sitting on the side of a flat bed without bedrails? 2  -MS     Moving to and from a bed to a chair (including a wheelchair)? 2  -MS     Standing up from a chair using your arms (e.g., wheelchair, bedside chair)? 2  -MS     Climbing 3-5 steps with a railing? 2  -MS     To walk in hospital room? 2  -MS     AM-PAC 6 Clicks Score (PT) 13  -MS     Highest Level of Mobility Goal 4 --> Transfer to chair/commode  -MS       Row Name 11/04/24 1004          Functional Assessment    Outcome Measure Options AM-PAC 6 Clicks Basic Mobility (PT)  -MS               User Key  (r) = Recorded By, (t) = Taken By, (c) = Cosigned By      Initials Name Provider Type    Dwain Salazar, PT Physical Therapist                                 Physical Therapy Education       Title: PT OT SLP Therapies (Done)       Topic: Physical Therapy (Done)       Point: Mobility training (Done)       Learning Progress Summary            Patient Acceptance, E,D, VU,NR  "by MS at 11/4/2024 1005    Acceptance, E, VU,NR by CN at 11/2/2024 1623                      Point: Home exercise program (Done)       Learning Progress Summary            Patient Acceptance, E,D, VU,NR by MS at 11/4/2024 1005    Acceptance, E, VU,NR by CN at 11/2/2024 1623                      Point: Body mechanics (Done)       Learning Progress Summary            Patient Acceptance, E,D, VU,NR by MS at 11/4/2024 1005    Acceptance, E, VU,NR by CN at 11/2/2024 1623                      Point: Precautions (Done)       Learning Progress Summary            Patient Acceptance, E,D, VU,NR by MS at 11/4/2024 1005    Acceptance, E, VU,NR by CN at 11/2/2024 1623                                      User Key       Initials Effective Dates Name Provider Type Discipline    MS 06/16/21 -  Dwain Hendricks, PT Physical Therapist PT    CN 06/16/21 -  Meg Alan, PT Physical Therapist PT                  PT Recommendation and Plan     Outcome Evaluation: Upon entering room, pt. sitting up in chair, initially asleep but awakens with verbal stimuli, and is agreeable to work with P.T. this date despite c/o fatigue and pain. Pt. does report feeling \"better\" this date compared to yesterday. This AM, pt. able to ambulate 30 feet, Min. assist x 2, with HHA x 1 (LUE). Pt. requires Min. assist x 2 for sit <-> stand transfers. BLE ther. ex. program x 10 reps completed for general strengthening. Mild unsteadiness throughout upright mobility but no overt losses of balance noted. Overall improved tolerance to functional activity this date compared to last P.T. session with an increase in gait distance. Will continue to progress functional mobility as tolerated.     Time Calculation:         PT Charges       Row Name 11/04/24 1008             Time Calculation    Start Time 0905  -MS      Stop Time 0924  -MS      Time Calculation (min) 19 min  -MS      PT Received On 11/04/24  -MS      PT - Next Appointment 11/05/24  -MS         " Time Calculation- PT    Total Timed Code Minutes- PT 18 minute(s)  -MS                User Key  (r) = Recorded By, (t) = Taken By, (c) = Cosigned By      Initials Name Provider Type    Dwain Salazar, PT Physical Therapist                  Therapy Charges for Today       Code Description Service Date Service Provider Modifiers Qty    [id number]  PT THERAPEUTIC ACT EA 15 MIN 11/4/2024 Dwain Hendricks PT GP 1            PT G-Codes  Outcome Measure Options: AM-PAC 6 Clicks Basic Mobility (PT)  AM-PAC 6 Clicks Score (PT): 13  AM-PAC 6 Clicks Score (OT): 7       Dwain Hendricks PT  11/4/2024

## 2024-11-04 NOTE — SIGNIFICANT NOTE
I accompanied patient and  to Dr. Iqbal's office visit.     Dr. Iqbal reviewed pathology and current plan. Patient will follow up with Dr. Arora and Davina as scheduled. They cancelled her FU with Dr. Ghosh d/t surgery on 10/31. Will need to reschedule soon.

## 2024-11-04 NOTE — CONSULTS
[facility] Consult Note      Patient Name: Madelyn Madrid  : 1940  MRN: [id number]  Primary Care Physician:  Therese Martinez MD  Referring Physician: Elmo Altman MD  Date of admission: 10/31/2024    Subjective     Reason for Consult:  hyponatremia     HPI:   Madelyn Madrid is a 84 y.o. 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  Her hospital course was complicated by poor oral intake and generalized fatigue with worsening sodium level with sodium down to 128 from 135 on admission.    Review of Systems:   14 point review of systems is otherwise negative except for mentioned above on HPI    Personal History     Past Medical History:   Diagnosis Date    Arthritis     COPD (chronic obstructive pulmonary disease) 10/2/24    CT scan    Depression     Emphysema of lung     CT scan    Endometrial cancer     GERD (gastroesophageal reflux disease)     Goiter     [place] (hard of hearing)     Hx of radiation therapy     GOES 5 TIMES A WEEK    Hypertension     Incontinence of urine     Lung mass     Pancreatitis        Past Surgical History:   Procedure Laterality Date    BRONCHOSCOPY WITH ION ROBOTIC ASSIST N/A 10/23/2024    Procedure: ROBOTIC BRONCHOSCOPY WITH FINE NEEDLE ASPIRATION AND CRYO BIOPSY, BRONCHOSCOPY WITH BRONCHOALVEOLAR LAVAGE;  Surgeon: Deann Iqbal MD;  Location: [facility] ENDOSCOPY;  Service: Robotics - Pulmonary;  Laterality: N/A;    COLONOSCOPY      HYSTERECTOMY      LAPAROSCOPY HYSTEROSCOPY ENDOMETRIAL ABLATION         Family History: family history includes Alzheimer's disease in her brother, mother, and sister; Cancer in her father and mother; Epilepsy in her brother; Rectal cancer in her mother.    Social History:  reports that she quit smoking about 25  years ago. Her smoking use included cigarettes. She started smoking about 65 years ago. She has a 40 pack-year smoking history. She has been exposed to tobacco smoke. She has never used smokeless tobacco. She reports that she does not currently use alcohol. She reports that she does not use drugs.    Home Medications:  Prior to Admission medications    Medication Sig Start Date End Date Taking? Authorizing Provider   acetaminophen (Tylenol) 325 MG tablet Take 2 tablets by mouth Every 6 (Six) Hours As Needed for Mild Pain.   Yes Oscar Junior MD   amLODIPine (NORVASC) 10 MG tablet Take 1 tablet by mouth Daily. 6/29/24  Yes Oscar Junior MD   escitalopram (LEXAPRO) 20 MG tablet Take 1 tablet by mouth Every Morning. 6/29/24  Yes ProviderOscar MD   OMEPRAZOLE PO Take 20 mg by mouth Daily As Needed.   Yes Oscar Junior MD   traMADol (ULTRAM) 50 MG tablet Take 1 tablet by mouth Every 6 (Six) Hours As Needed for Moderate Pain. 10/9/24  Yes Urbano Ghosh MD   docusate sodium (COLACE) 250 MG capsule Take 1 capsule by mouth 2 (Two) Times a Day As Needed for Constipation. 10/31/24   Elmo Altman MD   HYDROcodone-acetaminophen (NORCO) 5-325 MG per tablet Take 1 tablet by mouth Every 4 (Four) Hours As Needed (Pain). 10/31/24   Elmo Altman MD   naloxone (NARCAN) 4 MG/0.1ML nasal spray Call 911. Don't prime. [place] in 1 nostril for overdose. Repeat in 2-3 minutes in other nostril if no or minimal breathing/responsiveness. 10/31/24   Elmo Altman MD   ondansetron (Zofran) 4 MG tablet Take 1 tablet by mouth Every 8 (Eight) Hours As Needed for Nausea or Vomiting. 10/31/24   Elmo Altman MD       Allergies:  Allergies   Allergen Reactions    Other Nausea And Vomiting and GI Intolerance     Opioids--       Objective     Vitals:   Temp:  [97.2 °F (36.2 °C)-99.7 °F (37.6 °C)] 98.1 °F (36.7 °C)  Heart Rate:  [] 96  Resp:  [16] 16  BP: (109-153)/(55-68) 124/68  Flow (L/min) (Oxygen  Therapy):  [2] 2    Intake/Output Summary (Last 24 hours) at 11/4/2024 1702  Last data filed at 11/4/2024 1500  Gross per 24 hour   Intake 960 ml   Output 700 ml   Net 260 ml       Physical Exam:   Constitutional: Frail and ill looking  HEENT: Sclera anicteric, no conjunctival injection  Neck: Supple, no thyromegaly, no lymphadenopathy, trachea at midline, no JVD  Respiratory: Clear to auscultation bilaterally, nonlabored respiration  Cardiovascular: RRR, no murmurs, no rubs or gallops, no carotid bruit  Gastrointestinal: Positive bowel sounds, abdomen is soft, nontender and nondistended  : No palpable bladder  Musculoskeletal: Left arm wrapped  Psychiatric: Appropriate affect, cooperative  Neurologic: Oriented x3, moving all extremities, normal speech and mental status  Skin: Warm and dry       Scheduled Meds:     acetaminophen, 1,000 mg, Oral, Q8H  amLODIPine, 10 mg, Oral, Daily  enoxaparin, 40 mg, Subcutaneous, Nightly  escitalopram, 20 mg, Oral, QAM  pantoprazole, 40 mg, Oral, Q AM  phosphorus, 500 mg, Oral, BID  polyethylene glycol, 17 g, Oral, BID AC  senna-docusate sodium, 2 tablet, Oral, BID      IV Meds:        Results Reviewed:   I have personally reviewed the results from the time of this admission to 11/4/2024 17:02 EST     Lab Results   Component Value Date    GLUCOSE 99 11/04/2024    CALCIUM 8.6 11/04/2024     (L) 11/04/2024    K 3.9 11/04/2024    CO2 21.8 (L) 11/04/2024    CL 99 11/04/2024    BUN 34 (H) 11/04/2024    CREATININE 0.73 11/04/2024    BCR 46.6 (H) 11/04/2024    ANIONGAP 7.2 11/04/2024      Lab Results   Component Value Date    MG 2.2 11/04/2024    PHOS 2.0 (L) 11/04/2024    ALBUMIN 2.4 (L) 11/03/2024           Assessment / Plan     ASSESSMENT:  Acute hyponatremia likely multifactorial secondary to poor solute intake in the setting of 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  Adrenal insufficiency and is on differential diagnosis given hyperkalemia  History of HTN    Anemia  History of lung cancer with bone metastasis        PLAN:  Will place patient on fluid restriction of 1200 cc per 24 hours.  Will hold Lexapro  Check urine sodium and urine osmolality  Sodium every 8 hours  Labs in a.m.      Thank you for involving us in the care of Madelyn ANUP Madrid.  Please feel free to call with any questions.    Baudilio Alfredo MD  11/04/24  17:02 [facility]    [facility]  [phone]    Parts of this note may be an electronic transcription/translation of spoken language to printed text using the Dragon dictation system.

## 2024-11-04 NOTE — PROGRESS NOTES
Discharge Planning Assessment  [facility]     Patient Name: Madelyn Madrid  MRN: [id number]  Today's Date: 11/4/2024    Admit Date: 10/31/2024    Plan: skilled rehab pending in [facility]   Discharge Needs Assessment       Row Name 11/04/24 1325       Living Environment    People in Home spouse    Name(s) of People in Home Adam Madrid spouse [phone]    Current Living Arrangements home    Primary Care Provided by spouse/significant other    Family Caregiver if Needed spouse    Family Caregiver Names Adam Madrid spouse [phone]    Quality of Family Relationships helpful;involved;supportive    Able to Return to Prior Arrangements no       Resource/Environmental Concerns    Transportation Concerns none       Transition Planning    Patient/Family Anticipates Transition to other (see comments)  skilled rehab    Transportation Anticipated family or friend will provide       Discharge Needs Assessment    Concerns to be Addressed discharge planning    Equipment Needed After Discharge none    Outpatient/Agency/Support Group Needs skilled nursing facility    Discharge Facility/Level of Care Needs nursing facility, skilled    Provided Post Acute Provider List? Yes    Post Acute Provider List Nursing Home    Provided Post Acute Provider Quality & Resource List? Yes    Post Acute Provider Quality and Resource List Nursing Home    Delivered To Support Person    Support Person spouse Adam Madrid [phone]    Method of Delivery In person    Offered/Gave Vendor List yes    Patient's Choice of Community Agency(s) skilled rehab                   Discharge Plan       Row Name 11/04/24 1424       Plan    Plan skilled rehab pending in [facility]    Plan Comments Spoke with spouse Adam he stated that she was not receiving Chemotherapy, she was receiving Radiation but that will probably be on hold now. Call placed to [facility] Skilled rehab [phone] and spouse with [facility] she stated the Case  Manager is off today and will be in tomorrow. Spoke with Bailee/ [facility] and [facility]ral she wanted to know if the patient will be receiving Chemotherapy.      Row Name 11/04/24 5322       Plan    Plan skilled rehab 1st choice [facility], 2nd choice [facility], 3rd choice [facility] and 4th [facility]    Plan Comments Spoke with spouse Adam Madrid [phone] at bedside for discharge planning, face sheet verified. Spouse stated that he was helping her at home with ADL's, spouse stated she does not use any medical equipment at home. Her daughter and 2 sons lives close and are able to help her as needs. Provided spouse (patient choice) list he choose the rehabs from the list. Referrals have been placed in epic. Will need packet and pharmacy updated. Venkatesh HOLGUIN                  Continued Care and Services - Admitted Since 10/31/2024       Destination       Service Provider Request Status Services Address Phone Fax Patient Preferred    [facility] OP Pending - Request Sent -- [address] [place] [address] [phone] --    [facility] Pending - Request Sent -- [address] [place] [id number] [phone] --    [facility] Pending - Request Sent -- [address] [place] [address] [phone] --    [facility] Pending - Request Sent -- [address] [place] [address] [phone] --    [facility] SKILLED NURSING [facility] Pending - Request Sent -- [address] [facility] [place] [address] [phone]                   Expected Discharge Date and Time       Expected Discharge Date Expected Discharge Time    Nov 6, 2024            Demographic Summary       Row Name 11/04/24 3581       Contact Information    Permission Granted to Share Info With family/designee      Row Name 11/04/24 1325       General Information    Admission  Type inpatient    Arrived From emergency department    Referral Source admission list    Reason for Consult discharge planning    Preferred Language English                   Functional Status       Row Name 11/04/24 3063       Functional Status    Usual Activity Tolerance fair    Current Activity Tolerance poor       Functional Status, IADL    Medications assistive person    Meal Preparation completely dependent    Housekeeping completely dependent    Laundry completely dependent    Shopping completely dependent                   Psychosocial    No documentation.                  Abuse/Neglect    No documentation.                  Legal    No documentation.                  Substance Abuse    No documentation.                  Patient Forms    No documentation.                     Silvia Gatica RN

## 2024-11-05 ENCOUNTER — APPOINTMENT (OUTPATIENT)
Dept: GENERAL RADIOLOGY | Facility: HOSPITAL | Age: 84
DRG: 492 | End: 2024-11-05
Payer: MEDICARE

## 2024-11-05 ENCOUNTER — TELEPHONE (OUTPATIENT)
Dept: ONCOLOGY | Facility: CLINIC | Age: 84
End: 2024-11-05
Payer: MEDICARE

## 2024-11-05 LAB
ANION GAP SERPL CALCULATED.3IONS-SCNC: 10.6 MMOL/L (ref 5–15)
BACTERIA UR QL AUTO: ABNORMAL /HPF
BILIRUB UR QL STRIP: NEGATIVE
BUN SERPL-MCNC: 23 MG/DL (ref 8–23)
BUN/CREAT SERPL: 42.6 (ref 7–25)
CALCIUM SPEC-SCNC: 7.9 MG/DL (ref 8.6–10.5)
CHLORIDE SERPL-SCNC: 101 MMOL/L (ref 98–107)
CLARITY UR: CLEAR
CO2 SERPL-SCNC: 24.4 MMOL/L (ref 22–29)
COLOR UR: YELLOW
CREAT SERPL-MCNC: 0.54 MG/DL (ref 0.57–1)
DEPRECATED RDW RBC AUTO: 37.3 FL (ref 37–54)
EGFRCR SERPLBLD CKD-EPI 2021: 92.1 ML/MIN/1.73
ERYTHROCYTE [DISTWIDTH] IN BLOOD BY AUTOMATED COUNT: 13.2 % (ref 12.3–15.4)
GLUCOSE SERPL-MCNC: 110 MG/DL (ref 65–99)
GLUCOSE UR STRIP-MCNC: NEGATIVE MG/DL
HCT VFR BLD AUTO: 30.5 % (ref 34–46.6)
HCT VFR BLD AUTO: 35.1 % (ref 34–46.6)
HGB BLD-MCNC: 11 G/DL (ref 12–15.9)
HGB BLD-MCNC: 9.8 G/DL (ref 12–15.9)
HGB UR QL STRIP.AUTO: NEGATIVE
HYALINE CASTS UR QL AUTO: ABNORMAL /LPF
KETONES UR QL STRIP: NEGATIVE
LEUKOCYTE ESTERASE UR QL STRIP.AUTO: ABNORMAL
MAGNESIUM SERPL-MCNC: 1.8 MG/DL (ref 1.6–2.4)
MCH RBC QN AUTO: 25.2 PG (ref 26.6–33)
MCHC RBC AUTO-ENTMCNC: 32.1 G/DL (ref 31.5–35.7)
MCV RBC AUTO: 78.4 FL (ref 79–97)
NITRITE UR QL STRIP: NEGATIVE
OSMOLALITY UR: 544 MOSM/KG
PH UR STRIP.AUTO: 5.5 [PH] (ref 5–8)
PHOSPHATE SERPL-MCNC: 2.9 MG/DL (ref 2.5–4.5)
PLATELET # BLD AUTO: 327 10*3/MM3 (ref 140–450)
PMV BLD AUTO: 9.8 FL (ref 6–12)
POTASSIUM SERPL-SCNC: 5 MMOL/L (ref 3.5–5.2)
PROCALCITONIN SERPL-MCNC: 10.4 NG/ML (ref 0–0.25)
PROT UR QL STRIP: ABNORMAL
RBC # BLD AUTO: 3.89 10*6/MM3 (ref 3.77–5.28)
RBC # UR STRIP: ABNORMAL /HPF
REF LAB TEST METHOD: ABNORMAL
SODIUM SERPL-SCNC: 133 MMOL/L (ref 136–145)
SODIUM SERPL-SCNC: 133 MMOL/L (ref 136–145)
SODIUM SERPL-SCNC: 136 MMOL/L (ref 136–145)
SODIUM UR-SCNC: <20 MMOL/L
SP GR UR STRIP: 1.02 (ref 1–1.03)
SQUAMOUS #/AREA URNS HPF: ABNORMAL /HPF
UROBILINOGEN UR QL STRIP: ABNORMAL
WBC # UR STRIP: ABNORMAL /HPF
WBC NRBC COR # BLD AUTO: 13.07 10*3/MM3 (ref 3.4–10.8)

## 2024-11-05 PROCEDURE — 84145 PROCALCITONIN (PCT): CPT | Performed by: STUDENT IN AN ORGANIZED HEALTH CARE EDUCATION/TRAINING PROGRAM

## 2024-11-05 PROCEDURE — 83935 ASSAY OF URINE OSMOLALITY: CPT | Performed by: HOSPITALIST

## 2024-11-05 PROCEDURE — 81001 URINALYSIS AUTO W/SCOPE: CPT | Performed by: STUDENT IN AN ORGANIZED HEALTH CARE EDUCATION/TRAINING PROGRAM

## 2024-11-05 PROCEDURE — 97110 THERAPEUTIC EXERCISES: CPT

## 2024-11-05 PROCEDURE — 84300 ASSAY OF URINE SODIUM: CPT | Performed by: HOSPITALIST

## 2024-11-05 PROCEDURE — 84295 ASSAY OF SERUM SODIUM: CPT | Performed by: HOSPITALIST

## 2024-11-05 PROCEDURE — 97530 THERAPEUTIC ACTIVITIES: CPT

## 2024-11-05 PROCEDURE — 80048 BASIC METABOLIC PNL TOTAL CA: CPT | Performed by: STUDENT IN AN ORGANIZED HEALTH CARE EDUCATION/TRAINING PROGRAM

## 2024-11-05 PROCEDURE — 25010000002 MORPHINE PER 10 MG: Performed by: NURSE PRACTITIONER

## 2024-11-05 PROCEDURE — 83735 ASSAY OF MAGNESIUM: CPT | Performed by: STUDENT IN AN ORGANIZED HEALTH CARE EDUCATION/TRAINING PROGRAM

## 2024-11-05 PROCEDURE — 87040 BLOOD CULTURE FOR BACTERIA: CPT | Performed by: STUDENT IN AN ORGANIZED HEALTH CARE EDUCATION/TRAINING PROGRAM

## 2024-11-05 PROCEDURE — 85018 HEMOGLOBIN: CPT | Performed by: STUDENT IN AN ORGANIZED HEALTH CARE EDUCATION/TRAINING PROGRAM

## 2024-11-05 PROCEDURE — 85014 HEMATOCRIT: CPT | Performed by: STUDENT IN AN ORGANIZED HEALTH CARE EDUCATION/TRAINING PROGRAM

## 2024-11-05 PROCEDURE — 71045 X-RAY EXAM CHEST 1 VIEW: CPT

## 2024-11-05 PROCEDURE — 84100 ASSAY OF PHOSPHORUS: CPT | Performed by: STUDENT IN AN ORGANIZED HEALTH CARE EDUCATION/TRAINING PROGRAM

## 2024-11-05 PROCEDURE — 25010000002 CEFTRIAXONE PER 250 MG: Performed by: STUDENT IN AN ORGANIZED HEALTH CARE EDUCATION/TRAINING PROGRAM

## 2024-11-05 PROCEDURE — 85027 COMPLETE CBC AUTOMATED: CPT | Performed by: STUDENT IN AN ORGANIZED HEALTH CARE EDUCATION/TRAINING PROGRAM

## 2024-11-05 RX ADMIN — TRAMADOL HYDROCHLORIDE 50 MG: 50 TABLET, COATED ORAL at 15:36

## 2024-11-05 RX ADMIN — MORPHINE SULFATE 2 MG: 2 INJECTION, SOLUTION INTRAMUSCULAR; INTRAVENOUS at 00:18

## 2024-11-05 RX ADMIN — TRAMADOL HYDROCHLORIDE 50 MG: 50 TABLET, COATED ORAL at 03:32

## 2024-11-05 RX ADMIN — ACETAMINOPHEN 1000 MG: 500 TABLET ORAL at 19:08

## 2024-11-05 RX ADMIN — TRAMADOL HYDROCHLORIDE 50 MG: 50 TABLET, COATED ORAL at 09:25

## 2024-11-05 RX ADMIN — SENNOSIDES AND DOCUSATE SODIUM 2 TABLET: 50; 8.6 TABLET ORAL at 08:00

## 2024-11-05 RX ADMIN — ACETAMINOPHEN 1000 MG: 500 TABLET ORAL at 12:02

## 2024-11-05 RX ADMIN — ACETAMINOPHEN 1000 MG: 500 TABLET ORAL at 03:32

## 2024-11-05 RX ADMIN — AMLODIPINE BESYLATE 10 MG: 10 TABLET ORAL at 07:59

## 2024-11-05 RX ADMIN — CEFTRIAXONE 2000 MG: 2 INJECTION, POWDER, FOR SOLUTION INTRAMUSCULAR; INTRAVENOUS at 17:53

## 2024-11-05 RX ADMIN — HYDROXYZINE HYDROCHLORIDE 25 MG: 25 TABLET ORAL at 22:20

## 2024-11-05 RX ADMIN — TRAMADOL HYDROCHLORIDE 50 MG: 50 TABLET, COATED ORAL at 22:16

## 2024-11-05 RX ADMIN — PANTOPRAZOLE SODIUM 40 MG: 40 TABLET, DELAYED RELEASE ORAL at 05:41

## 2024-11-05 NOTE — PROGRESS NOTES
Name: Madelyn Madrid ADMIT: 10/31/2024   : 1940  PCP: Therese Martinez MD    MRN: [id number] LOS: 2 days   AGE/SEX: 84 y.o. female  ROOM: [facility]     Subjective   Subjective   Patient seen this morning. Daughter present at bedside .  Reports she is feeling better, p.o. intake improving.  Tremors much better.  This morning patient denied fevers or chills.  Pain was controlled.    Review of Systems   As above  Objective   Objective   Vital Signs  Temp:  [97.7 °F (36.5 °C)-99.7 °F (37.6 °C)] 99.7 °F (37.6 °C)  Heart Rate:  [] 108  Resp:  [16] 16  BP: (122-136)/(55-80) 136/56  SpO2:  [94 %-98 %] 96 %  on   ;   Device (Oxygen Therapy): room air  Body mass index is 19.75 kg/m².  Physical Exam    General: Alert, laying in bed, not in distress,, ill-appearing  HEENT: Normocephalic, atraumatic  CV: Tachycardic, regular rhythm  Lungs: CTA anteriorly, no wheezing  Abdomen: Soft, nontender, nondistended  Extremities: Right upper extremity sling in place, no significant edema    Results Review     I reviewed the patient's new clinical results.  Results from last 7 days   Lab Units 24  1157 24  03124  0324 24  0353 24  0806   WBC 10*3/mm3  --  13.07* 9.91 11.09* 11.17*   HEMOGLOBIN g/dL 11.0* 9.8* 10.9* 11.8* 12.8   PLATELETS 10*3/mm3  --  327 325 304 304     Results from last 7 days   Lab Units 24  0319 24  0005 24  1355 24  0324 24  1154 24  0353 24  0806   SODIUM mmol/L 136 133*  --  128* 129* 130* 133*   POTASSIUM mmol/L 5.0  --  3.9 5.4*  --  4.5 4.7   CHLORIDE mmol/L 101  --   --  99  --  98 103   CO2 mmol/L 24.4  --   --  21.8*  --  20.2* 19.0*   BUN mg/dL 23  --   --  34*  --  33* 31*   CREATININE mg/dL 0.54*  --   --  0.73  --  0.84 0.85   GLUCOSE mg/dL 110*  --   --  99  --  95 132*   Estimated Creatinine Clearance: 62.1 mL/min (A) (by C-G formula based on SCr of 0.54 mg/dL (L)).  Results from last 7 days   Lab Units  "11/03/24  0353 11/02/24  0806 10/31/24  2017   ALBUMIN g/dL 2.4* 2.7* 3.5   BILIRUBIN mg/dL 0.4  --  0.4   ALK PHOS U/L 78  --  102   AST (SGOT) U/L 27  --  47*   ALT (SGPT) U/L 7  --  11     Results from last 7 days   Lab Units 11/05/24  0319 11/04/24  0324 11/03/24  0353 11/02/24  0806 11/01/24  1159 11/01/24  0835 10/31/24  2017   CALCIUM mg/dL 7.9* 8.6 8.7 7.7*   < > 9.2 9.0   ALBUMIN g/dL  --   --  2.4* 2.7*  --   --  3.5   MAGNESIUM mg/dL 1.8 2.2  --  2.0  --  1.9  --    PHOSPHORUS mg/dL 2.9 2.0*  --  3.2  --  4.0  --     < > = values in this interval not displayed.     Results from last 7 days   Lab Units 11/05/24 0319   PROCALCITONIN ng/mL 10.40*   No results found for: \"COVID19\"  No results found for: \"HGBA1C\", \"POCGLU\"        XR Chest 1 View  Narrative: XR CHEST 1 VW-     HISTORY: Female who is 84 years-old, hypoxia     TECHNIQUE: Frontal view of the chest     COMPARISON: 10/23/2024     FINDINGS: The heart is enlarged. Pulmonary vasculature is unremarkable.  Aorta is tortuous, calcified. Previous CT demonstrated medial right  lower lobe pulmonary mass is not well characterized on this exam. No  acute infiltrate, pleural effusion, or pneumothorax. Right hemidiaphragm  is elevated. Surgical change of the right humerus is noted, at the level  of previously demonstrated pathologic fracture.     Impression: No acute infiltrate. Cardiomegaly. Tortuous aorta. Follow-up  as clinically indicated.     This report was finalized on 10/31/2024 8:57 PM by Dr. Wilmer Cosme M.D on Workstation: [id number]     XR Humerus Right  Narrative: XR HUMERUS RIGHT-     INDICATIONS: Postoperative evaluation.     TECHNIQUE: 3 views of the right humerus     COMPARISON: 10/31/2024 at 1607 hours     FINDINGS:     Postsurgical change of the right humerus is noted, with radiodense  marker extending across the pathologic fracture of the proximal humeral  shaft. Cortical step-off at the medial aspect of the fracture is  measured at 6 " mm. Surgical soft tissue gas is evident.        Impression:    Postsurgical changes.           This report was finalized on 10/31/2024 6:24 PM by Dr. Wilmer Cosme M.D on Workstation: [employer] C Arm During Surgery  This procedure was auto-finalized with no dictation required.  Peripheral Block  Evelyn Nicolas MD     10/31/2024  3:32 PM  Peripheral Block    Patient reassessed immediately prior to procedure    Patient location during procedure: holding area  Reason for block: at surgeon's request and post-op pain management  Performed by  Anesthesiologist: Evelyn Nicolas MD  Preanesthetic Checklist  Completed: patient identified, IV checked, site marked, risks and benefits   discussed, surgical consent, monitors and equipment checked, pre-op   evaluation and timeout performed  Prep:  Pt Position: supine  Sterile barriers:gloves  Prep: ChloraPrep  Patient monitoring: continuous pulse oximetry, blood pressure monitoring   and EKG  Procedure    Sedation: yes  Performed under: MAC  Guidance:ultrasound guided    ULTRASOUND INTERPRETATION.  Using ultrasound guidance a 20 G gauge needle   was placed in close proximity to the brachial plexus nerve, at which   point, under ultrasound guidance anesthetic was injected in the area of   the nerve and spread of the anesthesia was seen on ultrasound in close   proximity thereto.  There were no abnormalities seen on ultrasound; a   digital image was taken; and the patient tolerated the procedure with no   complications. Images:still images obtained, printed/placed on chart    Laterality:right  Block Type:interscalene  Injection Technique:single-shot  Needle Type:echogenic  Needle Gauge:20 G    Medications Used: bupivacaine liposome (EXPAREL) 1.3 % injection -   Infiltration   10 mL - 10/31/2024 2:37:00 PM  bupivacaine PF (MARCAINE) 0.25 % injection - Injection   25 mL - 10/31/2024 2:36:00 PM    Medications  Comment:Ultrasound guidance was used to view and verify  medication   disbursement.  Ultrasound guidance was used for needle placement.    Post Assessment  Injection Assessment: negative aspiration for heme, no paresthesia on   injection and incremental injection  Patient Tolerance:comfortable throughout block  Complications:no  Additional Notes  Ultrasound guidance was used to guide needle placement, as well as to view   and verify medication disbursement.   Performed by: Evelyn Nicolas MD    Scheduled Medications  acetaminophen, 1,000 mg, Oral, Q8H  amLODIPine, 10 mg, Oral, Daily  [Held by provider] enoxaparin, 40 mg, Subcutaneous, Nightly  [Held by provider] escitalopram, 20 mg, Oral, QAM  pantoprazole, 40 mg, Oral, Q AM  polyethylene glycol, 17 g, Oral, BID AC  senna-docusate sodium, 2 tablet, Oral, BID    Infusions     Diet  Diet: Regular/House; Fluid Consistency: Thin (IDDSI 0)    I have personally reviewed     [x]  Laboratory   []  Microbiology   []  Radiology   []  EKG/Telemetry  []  Cardiology/Vascular   []  Pathology    []  Records       Assessment/Plan     Active Hospital Problems    Diagnosis  POA    **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     Ms. Madrid is a 84 y.o. 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 Post operatively, she was confused and unable to weaned off of her oxygen, and so [facility] was asked to admit her to the hospital overnight.         Leukocytosis/elevated procalcitonin  -Patient clinically was feeling better this morning, however WBC did go up slightly.  Ordered procalcitonin which came back significantly elevated at 10.4.  Called daughter this afternoon who is at bedside, asked if there is any changes in her symptoms.  She did repor she was present chills and was feeling cold this afternoon.  Tmax this afternoon was 99.7.  Ordered chest x-ray, urinalysis, blood cultures.  CT of the right upper extremity to evaluate for any signs of postop infection.   - Initiated on IV ceftriaxone empirically      Anemia  --Hemoglobin on admission was 14.4  Trended down to 9.8 this morning, however repeat hemoglobin was 11-no signs of bleeding reported.  Denies dark stools.  Will hold prophylactic Lovenox for now  -Monitor daily CBC     Hypoxemia-  -minor. Likely related to anesthesia.   -Chest xray is negative.  ABG unremarkable.  -Weaned down to room air  -Encourage I-S-    Anxiety  -Continue as needed Atarax      Encephalopathy-  -likely toxic related to anesthesia.  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  Discussed with family about port placement while she is in the hospital, they would like to defer for placement until she is discharged from rehab.    Hyponatremia  -Sodium WNL  -Escitalopram on hold  -Fluids restriction removed    -Nephrology managing    Generalized tremors  -Spouse reports she started having tremors few weeks ago following lung  biopsy  -Continues to have significant tremors.  Partly suspect due to anxiety, initiated on as needed Atarax as above  -Neurology evaluated  -Tremors improved    Hyperkalemia  -Potassium upper limit  normal   -Monitor daily     GERD  -Continue PPI.  -      SCDs for DVT prophylaxis.  Full code.  Discussed with patient and nursing staff.  Discussed with family  Disposition: SNF, TBD pending infectious workup  Expected Discharge Date: 11/7/2024; Expected Discharge Time:        Copied text in this note has been reviewed and is accurate as of 11/05/24.         Dictated utilizing Dragon dictation        Miles Dietrich MD  [facility]  11/05/24  15:07 EST

## 2024-11-05 NOTE — CASE MANAGEMENT/SOCIAL WORK
Continued Stay Note  [facility]     Patient Name: Madelyn Madrid  MRN: [id number]  Today's Date: 11/5/2024    Admit Date: 10/31/2024    Plan: skilled rehab pending in [facility]   Discharge Plan       Row Name 11/05/24 1447       Plan    Plan Comments Spoke with Jina Arias/[facility]. She received the referral and will follow up with CCP tomorrow.      Row Name 11/05/24 3034       Plan    Plan Comments Spoke with spouse Adam and he states that his first choice for rehab is a swing bed at [facility].  He spoke with Jina Arias ( phone [phone], fax- [phone]) and provided CCP with her contact info. He asked that CCP fax her clinicals to Jina. Clinicals faxed and voicemail left. Awaiting call back. [facility], [facility] and [facility] have all accepted, but spouse really wanting [facility]                   Discharge Codes    No documentation.                 Expected Discharge Date and Time       Expected Discharge Date Expected Discharge Time    Nov 7, 2024               Jennifer Medrano RN

## 2024-11-05 NOTE — THERAPY TREATMENT NOTE
Patient Name: Madelyn Madrid  : 1940    MRN: [id number]                              Today's Date: 2024       Admit Date: 10/31/2024    Visit Dx:     ICD-10-CM ICD-9-CM   1. 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    Surgery, elective    Hypertension    GERD (gastroesophageal reflux disease)    Depression    COPD (chronic obstructive pulmonary disease)    Severe malnutrition     Past Medical History:   Diagnosis Date    Arthritis     COPD (chronic obstructive pulmonary disease) 10/2/24    CT scan    Depression     Emphysema of lung     CT scan    Endometrial cancer     GERD (gastroesophageal reflux disease)     Goiter     [place] (hard of hearing)     Hx of radiation therapy     GOES 5 TIMES A WEEK    Hypertension     Incontinence of urine     Lung mass     Pancreatitis      Past Surgical History:   Procedure Laterality Date    BRONCHOSCOPY WITH ION ROBOTIC ASSIST N/A 10/23/2024    Procedure: ROBOTIC BRONCHOSCOPY WITH FINE NEEDLE ASPIRATION AND CRYO BIOPSY, BRONCHOSCOPY WITH BRONCHOALVEOLAR LAVAGE;  Surgeon: Deann Iqbal MD;  Location: [facility] ENDOSCOPY;  Service: Robotics - Pulmonary;  Laterality: N/A;    COLONOSCOPY      HYSTERECTOMY      LAPAROSCOPY HYSTEROSCOPY ENDOMETRIAL ABLATION        General Information       Row Name 24 1016          Physical Therapy Time and Intention    Document Type therapy note (daily note)  -MS     Mode of Treatment occupational therapy;physical therapy;co-treatment  Activity limitation due to fatigue/weakness; Working on functional balance and strengthening while performing ADL's  -MS       Row Name 24 1016          General Information    Patient Profile  Reviewed yes  -MS     Existing Precautions/Restrictions fall   NWB RUE;  RUE in sling  -MS     Barriers to Rehab hearing deficit  -MS       Row Name 11/05/24 1016          Cognition    Orientation Status (Cognition) oriented to;person  -MS       Row Name 11/05/24 1016          Safety Issues/Impairments Affecting Functional Mobility    Comment, Safety Issues/Impairments (Mobility) Gait belt used for safety.  -MS               User Key  (r) = Recorded By, (t) = Taken By, (c) = Cosigned By      Initials Name Provider Type    Dwain Salazar, PT Physical Therapist                   Mobility       Row Name 11/05/24 1017          Bed Mobility    Supine-Sit [place] (Bed Mobility) minimum assist (75% patient effort)  -MS       Row Name 11/05/24 1017          Sit-Stand Transfer    Sit-Stand [place] (Transfers) minimum assist (75% patient effort)  -MS     Assistive Device (Sit-Stand Transfers) --  HHA x 1 (LUE)  -MS     Comment, (Sit-Stand Transfer) Pt. performed sit <-> stand transfers x 5 reps for functional strength training.  -MS       Row Name 11/05/24 1017          Gait/Stairs (Locomotion)    [place] Level (Gait) minimum assist (75% patient effort);1 person assist  -MS     Assistive Device (Gait) --  HHA x 1 (LUE)  -MS     Distance in Feet (Gait) 35  -MS     Deviations/Abnormal Patterns (Gait) matty decreased;base of support, narrow  -MS     Comment, (Gait/Stairs) Unsteady when upright but no overt losses of balance noted.  -MS               User Key  (r) = Recorded By, (t) = Taken By, (c) = Cosigned By      Initials Name Provider Type    Dwain Salazar PT Physical Therapist                   Obj/Interventions    No documentation.                  Goals/Plan    No documentation.                  Clinical Impression       Row Name 11/05/24 1018          Pain    Pretreatment Pain Rating 5/10  -MS     Posttreatment Pain Rating 5/10  -MS     Pain Location shoulder  -MS     Pain Side/Orientation  right  -MS     Pain Management Interventions nursing notified;positioning techniques utilized;premedicated for activity  -MS       Row Name 11/05/24 1018          Positioning and Restraints    Pre-Treatment Position in bed  -MS     Post Treatment Position chair  -MS     In Chair notified nsg;reclined;sitting;call light within reach;encouraged to call for assist;exit alarm on;with family/caregiver  -MS               User Key  (r) = Recorded By, (t) = Taken By, (c) = Cosigned By      Initials Name Provider Type    Dwain Salazar, PT Physical Therapist                   Outcome Measures       Row Name 11/05/24 1018 11/05/24 0129       How much help from another person do you currently need...    Turning from your back to your side while in flat bed without using bedrails? 3  -MS 3  -JF    Moving from lying on back to sitting on the side of a flat bed without bedrails? 3  -MS 3  -JF    Moving to and from a bed to a chair (including a wheelchair)? 3  -MS 2  -JF    Standing up from a chair using your arms (e.g., wheelchair, bedside chair)? 3  -MS 2  -JF    Climbing 3-5 steps with a railing? 2  -MS 2  -JF    To walk in hospital room? 3  -MS 2  -JF    AM-PAC 6 Clicks Score (PT) 17  -MS 14  -JF    Highest Level of Mobility Goal 5 --> Static standing  -MS 4 --> Transfer to chair/commode  -JF      Row Name 11/05/24 1018          Functional Assessment    Outcome Measure Options AM-PAC 6 Clicks Basic Mobility (PT)  -MS               User Key  (r) = Recorded By, (t) = Taken By, (c) = Cosigned By      Initials Name Provider Type    Dwain Salazar, PT Physical Therapist    Jessa Mcfadden, RN Registered Nurse                                 Physical Therapy Education       Title: PT OT SLP Therapies (Done)       Topic: Physical Therapy (Done)       Point: Mobility training (Done)       Learning Progress Summary            Patient Acceptance, E,D, VU,NR by MS at 11/5/2024 1019    Acceptance, E,D, VU,NR by MS  at 11/4/2024 1005    Acceptance, E, VU,NR by CN at 11/2/2024 1623                      Point: Home exercise program (Done)       Learning Progress Summary            Patient Acceptance, E,D, VU,NR by MS at 11/4/2024 1005    Acceptance, E, VU,NR by CN at 11/2/2024 1623                      Point: Body mechanics (Done)       Learning Progress Summary            Patient Acceptance, E,D, VU,NR by MS at 11/5/2024 1019    Acceptance, E,D, VU,NR by MS at 11/4/2024 1005    Acceptance, E, VU,NR by CN at 11/2/2024 1623                      Point: Precautions (Done)       Learning Progress Summary            Patient Acceptance, E,D, VU,NR by MS at 11/5/2024 1019    Acceptance, E,D, VU,NR by MS at 11/4/2024 1005    Acceptance, E, VU,NR by CN at 11/2/2024 1623                                      User Key       Initials Effective Dates Name Provider Type Discipline    MS 06/16/21 -  Dwain Hendricks, PT Physical Therapist PT    CN 06/16/21 -  Meg Alan, PT Physical Therapist PT                  PT Recommendation and Plan     Outcome Evaluation: Upon entering room, pt. supine in bed, initially asleep but awakens with verbal/tactile stimuli, and is agreeable to work with P.T. this date despite c/o fatigue and Right U.E. pain.  This AM, pt. able to ambulate 35 feet, Min. assist x 1, with HHA x 1 (LUE).  Pt. requires Min. assist x 1 for bed mobility and Min. assist x 1 for sit <-> stand transfers.  Pt. performed sit <-> stand transfers x 5 reps for functional strength training.  Pt. unsteady throughout upright mobility but exhibits no overt losses of balance.  Will continue to progress functional mobility as tolerated.     Time Calculation:         PT Charges       Row Name 11/05/24 1022             Time Calculation    Start Time 0940  -MS      Stop Time 0958  -MS      Time Calculation (min) 18 min  -MS      PT Received On 11/05/24  -MS      PT - Next Appointment 11/06/24  -MS         Time Calculation- PT    Total  Timed Code Minutes- PT 17 minute(s)  -MS                User Key  (r) = Recorded By, (t) = Taken By, (c) = Cosigned By      Initials Name Provider Type    Dwain Salazar, PT Physical Therapist                  Therapy Charges for Today       Code Description Service Date Service Provider Modifiers Qty    05266572153 HC PT THERAPEUTIC ACT EA 15 MIN 11/4/2024 Dwain Hendricks, PT GP 1    40862106044 HC PT THERAPEUTIC ACT EA 15 MIN 11/5/2024 Dwain Hendricks, PT GP 1            PT G-Codes  Outcome Measure Options: AM-PAC 6 Clicks Basic Mobility (PT)  AM-PAC 6 Clicks Score (PT): 17  AM-PAC 6 Clicks Score (OT): 7       Dwain Hendricks, PT  11/5/2024

## 2024-11-05 NOTE — CASE MANAGEMENT/SOCIAL WORK
Continued Stay Note  [facility]     Patient Name: Madelyn Madrid  MRN: [id number]  Today's Date: 11/5/2024    Admit Date: 10/31/2024    Plan: skilled rehab pending in [facility]   Discharge Plan       Row Name 11/05/24 1254       Plan    Plan Comments Spoke with spouse Adam and he states that his first choice for rehab is a swing bed at [facility].  He spoke with Jina Arias ( phone [phone], fax- [phone]) and provided [facility] with her contact info. He asked that CCP fax her clinicals to Jina. Clinicals faxed and voicemail left. Awaiting call back. [facility] all accepted, but spouse really wanting [facility]                   Discharge Codes    No documentation.                 Expected Discharge Date and Time       Expected Discharge Date Expected Discharge Time    Nov 6, 2024               Jennifer Medrano RN

## 2024-11-05 NOTE — TELEPHONE ENCOUNTER
Called Pt to r/s appt from 11/1. Have opening on 11/8 at 930 w/Dr Ghosh open... No answer left v/m

## 2024-11-05 NOTE — THERAPY TREATMENT NOTE
Patient Name: Madelyn Madrid  : 1940    MRN: [id number]                              Today's Date: 2024       Admit Date: 10/31/2024    Visit Dx:     ICD-10-CM ICD-9-CM   1. 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    Surgery, elective    Hypertension    GERD (gastroesophageal reflux disease)    Depression    COPD (chronic obstructive pulmonary disease)    Severe malnutrition     Past Medical History:   Diagnosis Date    Arthritis     COPD (chronic obstructive pulmonary disease) 10/2/24    CT scan    Depression     Emphysema of lung     CT scan    Endometrial cancer     GERD (gastroesophageal reflux disease)     Goiter     [place] (hard of hearing)     Hx of radiation therapy     GOES 5 TIMES A WEEK    Hypertension     Incontinence of urine     Lung mass     Pancreatitis      Past Surgical History:   Procedure Laterality Date    BRONCHOSCOPY WITH ION ROBOTIC ASSIST N/A 10/23/2024    Procedure: ROBOTIC BRONCHOSCOPY WITH FINE NEEDLE ASPIRATION AND CRYO BIOPSY, BRONCHOSCOPY WITH BRONCHOALVEOLAR LAVAGE;  Surgeon: Deann Iqbal MD;  Location: [facility] ENDOSCOPY;  Service: Robotics - Pulmonary;  Laterality: N/A;    COLONOSCOPY      HYSTERECTOMY      LAPAROSCOPY HYSTEROSCOPY ENDOMETRIAL ABLATION        General Information       Row Name 24 1014          OT Time and Intention    Subjective Information complains of;fatigue;pain  -RB     Document Type therapy note (daily note)  -RB     Mode of Treatment co-treatment;physical therapy;occupational therapy  low pt activity tolerance  -RB     Patient Effort good  -RB     Symptoms Noted During/After Treatment increased pain  -RB       Row Name 24 1014           General Information    Patient Profile Reviewed yes  -       Row Name 11/05/24 1014          Cognition    Orientation Status (Cognition) oriented x 3;other (see comments)  The pt was initally more confused this morning due to being woke up from a deep sleep  -               User Key  (r) = Recorded By, (t) = Taken By, (c) = Cosigned By      Initials Name Provider Type    RB Matilde Nunn OT Occupational Therapist                     Mobility/ADL's       Row Name 11/05/24 1011          Bed Mobility    Bed Mobility supine-sit  -RB     Supine-Sit [place] (Bed Mobility) minimum assist (75% patient effort);1 person assist;verbal cues;1 person to manage equipment  -     Bed Mobility, Safety Issues decreased use of arms for pushing/pulling;impaired trunk control for bed mobility  -       Row Name 11/05/24 1011          Transfers    Transfers stand-sit transfer;sit-stand transfer  -     Comment, (Transfers) The pt was able to complete several sit to stands from the bedside chair and transfer from the bed to the chair  -       Row Name 11/05/24 1011          Sit-Stand Transfer    Sit-Stand [place] (Transfers) minimum assist (75% patient effort);1 person assist;2 person assist;verbal cues  -RB     Comment, (Sit-Stand Transfer) [facility]       Row Name 11/05/24 1011          Stand-Sit Transfer    Stand-Sit [place] (Transfers) minimum assist (75% patient effort);1 person assist;2 person assist;verbal cues  -RB     Comment, (Stand-Sit Transfer) [facility]       Row Name 11/05/24 1011          Functional Mobility    Functional Mobility- Ind. Level minimum assist (75% patient effort);1 person + 1 person to manage equipment;verbal cues required  -     Functional Mobility- Comment [facility]  -       Row Name 11/05/24 1011          Activities of Daily Living    BADL Assessment/Intervention lower body dressing  -       Row Name 11/05/24 1011          Lower Body Dressing Assessment/Training     [place] Level (Lower Body Dressing) lower body dressing skills;dependent (less than 25% patient effort)  -RB     Position (Lower Body Dressing) supine  -RB               User Key  (r) = Recorded By, (t) = Taken By, (c) = Cosigned By      Initials Name Provider Type    Matilde Snider OT Occupational Therapist                   Obj/Interventions       Row Name 11/05/24 1009          Elbow/Forearm (Therapeutic Exercise)    Elbow/Forearm (Therapeutic Exercise) AAROM (active assistive range of motion)  -RB     Elbow/Forearm AAROM (Therapeutic Exercise) right;flexion;extension;supination;pronation;sitting;10 repetitions  -RB       Row Name 11/05/24 1009          Wrist (Therapeutic Exercise)    Wrist (Therapeutic Exercise) AAROM (active assistive range of motion)  -RB     Wrist AAROM (Therapeutic Exercise) right;flexion;extension;10 repetitions  -RB       Row Name 11/05/24 1009          Hand (Therapeutic Exercise)    Hand (Therapeutic Exercise) AROM (active range of motion)  -RB     Hand AROM/AAROM (Therapeutic Exercise) right;AROM (active range of motion);finger flexion;finger extension;finger aBduction;finger aDduction;thumb extension;thumb flexion;10 repetitions  -RB       Row Name 11/05/24 1009          Motor Skills    Therapeutic Exercise elbow/forearm;wrist;hand  -RB       Row Name 11/05/24 1009          Balance    Comment, Balance Min A x1-2 for standing balance and HHA. initital posterior lean  -RB               User Key  (r) = Recorded By, (t) = Taken By, (c) = Cosigned By      Initials Name Provider Type    Matilde Snider OT Occupational Therapist                   Goals/Plan    No documentation.                  Clinical Impression       Row Name 11/05/24 1006          Pain Assessment    Pain Management Interventions breathing exercises utilized;activity modification encouraged;premedicated for activity;positioning techniques utilized  -RB     Pre/Posttreatment Pain Comment she did not provide  a number rating but did report pain in her R arm  -RB       Row Name 11/05/24 1006          Plan of Care Review    Plan of Care Reviewed With patient;daughter  -RB     Progress improving  -RB       Row Name 11/05/24 1006          Therapy Assessment/Plan (OT)    Rehab Potential (OT) good  -RB     Criteria for Skilled Therapeutic Interventions Met (OT) yes;skilled treatment is necessary  -RB     Therapy Frequency (OT) 5 times/wk  -RB       Row Name 11/05/24 1006          Therapy Plan Review/Discharge Plan (OT)    Anticipated Discharge Disposition (OT) skilled nursing facility  -RB       Row Name 11/05/24 1006          Positioning and Restraints    Pre-Treatment Position in bed  -RB     Post Treatment Position chair  -RB     In Chair notified nsg;reclined;sitting;encouraged to call for assist;call light within reach;RUE elevated;exit alarm on;with family/caregiver  -RB               User Key  (r) = Recorded By, (t) = Taken By, (c) = Cosigned By      Initials Name Provider Type    Matilde Snider, OT Occupational Therapist                   Outcome Measures       Row Name 11/05/24 0129          How much help from another person do you currently need...    Turning from your back to your side while in flat bed without using bedrails? 3  -JF     Moving from lying on back to sitting on the side of a flat bed without bedrails? 3  -JF     Moving to and from a bed to a chair (including a wheelchair)? 2  -JF     Standing up from a chair using your arms (e.g., wheelchair, bedside chair)? 2  -JF     Climbing 3-5 steps with a railing? 2  -JF     To walk in hospital room? 2  -JF     AM-PAC 6 Clicks Score (PT) 14  -JF     Highest Level of Mobility Goal 4 --> Transfer to chair/commode  -JF               User Key  (r) = Recorded By, (t) = Taken By, (c) = Cosigned By      Initials Name Provider Type    Jessa Mcfadden, RN Registered Nurse                    Occupational Therapy Education       Title: PT OT SLP  Therapies (Done)       Topic: Occupational Therapy (Done)       Point: ADL training (Done)       Description:   Instruct learner(s) on proper safety adaptation and remediation techniques during self care or transfers.   Instruct in proper use of assistive devices.                  Learning Progress Summary            Patient Acceptance, E,TB, VU by  at 11/3/2024 1218    Comment: Instructed in role of OT, discharge planning, home safety, fall prevention, NWB restrictions, caregiver education                      Point: Home exercise program (Done)       Description:   Instruct learner(s) on appropriate technique for monitoring, assisting and/or progressing therapeutic exercises/activities.                  Learning Progress Summary            Patient Acceptance, E,TB, VU by  at 11/3/2024 1218    Comment: Instructed in role of OT, discharge planning, home safety, fall prevention, NWB restrictions, caregiver education                      Point: Precautions (Done)       Description:   Instruct learner(s) on prescribed precautions during self-care and functional transfers.                  Learning Progress Summary            Patient Acceptance, E,TB, VU by  at 11/3/2024 1218    Comment: Instructed in role of OT, discharge planning, home safety, fall prevention, NWB restrictions, caregiver education                      Point: Body mechanics (Done)       Description:   Instruct learner(s) on proper positioning and spine alignment during self-care, functional mobility activities and/or exercises.                  Learning Progress Summary            Patient Acceptance, E,TB, VU by  at 11/3/2024 1218    Comment: Instructed in role of OT, discharge planning, home safety, fall prevention, NWB restrictions, caregiver education                                      User Key       Initials Effective Dates Name Provider Type Discipline     08/31/23 -  Evelyn Moreno OT Occupational Therapist OT                  OT  Recommendation and Plan  Therapy Frequency (OT): 5 times/wk  Plan of Care Review  Plan of Care Reviewed With: patient, daughter  Progress: improving  Outcome Evaluation: The pt participated in OT this morning. She was sleeping soundly in her bedside chair. She was agreeable to a OT/PT co-tx. Assistance was provided with sling management and she completed elbow->hand exercises when the sling was removed. Min A x2 to stand and mobilize into the bathroom for toileting. Total A provided for ema hygiene and brief management. She was able to mobilize in her room with Min A x2 with PT leading. She ended the session sitting in her bedside chair. SNF recommended.     Time Calculation:         Time Calculation- OT       Row Name 11/05/24 1006             Time Calculation- OT    OT Start Time 0944  -RB      OT Stop Time 0959  -RB      OT Time Calculation (min) 15 min  -RB      Total Timed Code Minutes- OT 15 minute(s)  -RB      OT Received On 11/05/24  -RB      OT - Next Appointment 11/06/24  -RB         Timed Charges    05111 - OT Therapeutic Exercise Minutes 15  -RB         Total Minutes    Timed Charges Total Minutes 15  -RB       Total Minutes 15  -RB                User Key  (r) = Recorded By, (t) = Taken By, (c) = Cosigned By      Initials Name Provider Type    RB Matilde Nunn OT Occupational Therapist                  Therapy Charges for Today       Code Description Service Date Service Provider Modifiers Qty    43995173100 HC OT SELF CARE/MGMT/TRAIN EA 15 MIN 11/4/2024 Matilde Nunn OT GO 1    36549970002 HC OT THER PROC EA 15 MIN 11/5/2024 Matilde Nunn OT GO 1                 Matilde Nunn OT  11/5/2024

## 2024-11-05 NOTE — PLAN OF CARE
The pt participated in OT/PT this morning. She was able to complete bed mobility with Min A. Min A 1-2 to stand and mobilize across her room. She demonstrated impaired standing balance and activity tolerance. Seated in the bedside chair she was able to participate in gentle elbow->hand AAROM exercises of her RUE. Education provided to the pt and family on removing the sling several times per day for skin checks and gentle ROM of her elbow. SNF recommended.       Anticipated Discharge Disposition (OT): skilled nursing facility

## 2024-11-05 NOTE — PLAN OF CARE
Goal Outcome Evaluation:  Plan of Care Reviewed With: patient           Outcome Evaluation: Upon entering room, pt. supine in bed, initially asleep but awakens with verbal/tactile stimuli, and is agreeable to work with P.T. this date despite c/o fatigue and Right U.E. pain.  This AM, pt. able to ambulate 35 feet, Min. assist x 1, with HHA x 1 (LUE).  Pt. requires Min. assist x 1 for bed mobility and Min. assist x 1 for sit <-> stand transfers.  Pt. performed sit <-> stand transfers x 5 reps for functional strength training.  Pt. unsteady throughout upright mobility but exhibits no overt losses of balance.  Will continue to progress functional mobility as tolerated.

## 2024-11-05 NOTE — PLAN OF CARE
Goal Outcome Evaluation:                      Pt is a post op day 6 of a rt humerus IM nailing. Dressing is clean dry and intact. Pt continues with the island dressing and blue sling. Pt continues with the Pure Wick in place. Voiding function intact. Pt continues on a 1200 fluid restriction. Pt educated on blood pressure monitoring. Pt resting at this time, will continue to monitor.

## 2024-11-06 ENCOUNTER — APPOINTMENT (OUTPATIENT)
Dept: CT IMAGING | Facility: HOSPITAL | Age: 84
DRG: 492 | End: 2024-11-06
Payer: MEDICARE

## 2024-11-06 LAB
ALBUMIN SERPL-MCNC: 1.9 G/DL (ref 3.5–5.2)
ALBUMIN SERPL-MCNC: 2.3 G/DL (ref 3.5–5.2)
ALP SERPL-CCNC: 131 U/L (ref 39–117)
ALT SERPL W P-5'-P-CCNC: 6 U/L (ref 1–33)
ANION GAP SERPL CALCULATED.3IONS-SCNC: 7.6 MMOL/L (ref 5–15)
AST SERPL-CCNC: 18 U/L (ref 1–32)
BILIRUB CONJ SERPL-MCNC: <0.2 MG/DL (ref 0–0.3)
BILIRUB INDIRECT SERPL-MCNC: ABNORMAL MG/DL
BILIRUB SERPL-MCNC: 0.2 MG/DL (ref 0–1.2)
BUN SERPL-MCNC: 15 MG/DL (ref 8–23)
BUN/CREAT SERPL: 31.9 (ref 7–25)
CALCIUM SPEC-SCNC: 7.8 MG/DL (ref 8.6–10.5)
CHLORIDE SERPL-SCNC: 98 MMOL/L (ref 98–107)
CO2 SERPL-SCNC: 26.4 MMOL/L (ref 22–29)
CREAT SERPL-MCNC: 0.47 MG/DL (ref 0.57–1)
DEPRECATED RDW RBC AUTO: 37.4 FL (ref 37–54)
EGFRCR SERPLBLD CKD-EPI 2021: 95.2 ML/MIN/1.73
ERYTHROCYTE [DISTWIDTH] IN BLOOD BY AUTOMATED COUNT: 13.1 % (ref 12.3–15.4)
GLUCOSE SERPL-MCNC: 102 MG/DL (ref 65–99)
HCT VFR BLD AUTO: 29.7 % (ref 34–46.6)
HGB BLD-MCNC: 9.7 G/DL (ref 12–15.9)
MAGNESIUM SERPL-MCNC: 1.6 MG/DL (ref 1.6–2.4)
MCH RBC QN AUTO: 25.5 PG (ref 26.6–33)
MCHC RBC AUTO-ENTMCNC: 32.7 G/DL (ref 31.5–35.7)
MCV RBC AUTO: 78 FL (ref 79–97)
MRSA DNA SPEC QL NAA+PROBE: NORMAL
PHOSPHATE SERPL-MCNC: 2.1 MG/DL (ref 2.5–4.5)
PLATELET # BLD AUTO: 315 10*3/MM3 (ref 140–450)
PMV BLD AUTO: 9.5 FL (ref 6–12)
POTASSIUM SERPL-SCNC: 4.3 MMOL/L (ref 3.5–5.2)
PROT SERPL-MCNC: 5.1 G/DL (ref 6–8.5)
RBC # BLD AUTO: 3.81 10*6/MM3 (ref 3.77–5.28)
SODIUM SERPL-SCNC: 132 MMOL/L (ref 136–145)
WBC NRBC COR # BLD AUTO: 15.24 10*3/MM3 (ref 3.4–10.8)

## 2024-11-06 PROCEDURE — 83735 ASSAY OF MAGNESIUM: CPT | Performed by: STUDENT IN AN ORGANIZED HEALTH CARE EDUCATION/TRAINING PROGRAM

## 2024-11-06 PROCEDURE — 80053 COMPREHEN METABOLIC PANEL: CPT | Performed by: INTERNAL MEDICINE

## 2024-11-06 PROCEDURE — 25010000002 CEFTRIAXONE PER 250 MG: Performed by: STUDENT IN AN ORGANIZED HEALTH CARE EDUCATION/TRAINING PROGRAM

## 2024-11-06 PROCEDURE — 87641 MR-STAPH DNA AMP PROBE: CPT | Performed by: INTERNAL MEDICINE

## 2024-11-06 PROCEDURE — 25010000002 VANCOMYCIN 1 G RECONSTITUTED SOLUTION 1 EACH VIAL: Performed by: STUDENT IN AN ORGANIZED HEALTH CARE EDUCATION/TRAINING PROGRAM

## 2024-11-06 PROCEDURE — 25810000003 SODIUM CHLORIDE 0.9 % SOLUTION 250 ML FLEX CONT: Performed by: STUDENT IN AN ORGANIZED HEALTH CARE EDUCATION/TRAINING PROGRAM

## 2024-11-06 PROCEDURE — 73200 CT UPPER EXTREMITY W/O DYE: CPT

## 2024-11-06 PROCEDURE — 85027 COMPLETE CBC AUTOMATED: CPT | Performed by: STUDENT IN AN ORGANIZED HEALTH CARE EDUCATION/TRAINING PROGRAM

## 2024-11-06 PROCEDURE — 82248 BILIRUBIN DIRECT: CPT | Performed by: INTERNAL MEDICINE

## 2024-11-06 PROCEDURE — 97110 THERAPEUTIC EXERCISES: CPT

## 2024-11-06 PROCEDURE — 84100 ASSAY OF PHOSPHORUS: CPT | Performed by: INTERNAL MEDICINE

## 2024-11-06 PROCEDURE — 25810000003 SODIUM CHLORIDE 0.9 % SOLUTION: Performed by: INTERNAL MEDICINE

## 2024-11-06 PROCEDURE — 25010000002 ONDANSETRON PER 1 MG

## 2024-11-06 RX ORDER — TRAMADOL HYDROCHLORIDE 50 MG/1
50 TABLET ORAL ONCE
Status: DISCONTINUED | OUTPATIENT
Start: 2024-11-06 | End: 2024-11-11 | Stop reason: HOSPADM

## 2024-11-06 RX ADMIN — TRAMADOL HYDROCHLORIDE 50 MG: 50 TABLET, COATED ORAL at 03:56

## 2024-11-06 RX ADMIN — TRAMADOL HYDROCHLORIDE 50 MG: 50 TABLET, COATED ORAL at 22:17

## 2024-11-06 RX ADMIN — SODIUM PHOSPHATE, MONOBASIC, MONOHYDRATE AND SODIUM PHOSPHATE, DIBASIC, ANHYDROUS 15 MMOL: 276; 142 INJECTION, SOLUTION INTRAVENOUS at 09:12

## 2024-11-06 RX ADMIN — TRAMADOL HYDROCHLORIDE 50 MG: 50 TABLET, COATED ORAL at 16:03

## 2024-11-06 RX ADMIN — VANCOMYCIN HYDROCHLORIDE 1000 MG: 1 INJECTION, POWDER, LYOPHILIZED, FOR SOLUTION INTRAVENOUS at 12:24

## 2024-11-06 RX ADMIN — TRAMADOL HYDROCHLORIDE 50 MG: 50 TABLET, COATED ORAL at 09:29

## 2024-11-06 RX ADMIN — ACETAMINOPHEN 1000 MG: 500 TABLET ORAL at 20:58

## 2024-11-06 RX ADMIN — PANTOPRAZOLE SODIUM 40 MG: 40 TABLET, DELAYED RELEASE ORAL at 06:15

## 2024-11-06 RX ADMIN — ACETAMINOPHEN 1000 MG: 500 TABLET ORAL at 03:56

## 2024-11-06 RX ADMIN — SENNOSIDES AND DOCUSATE SODIUM 2 TABLET: 50; 8.6 TABLET ORAL at 09:15

## 2024-11-06 RX ADMIN — CEFTRIAXONE 2000 MG: 2 INJECTION, POWDER, FOR SOLUTION INTRAMUSCULAR; INTRAVENOUS at 16:03

## 2024-11-06 RX ADMIN — ACETAMINOPHEN 1000 MG: 500 TABLET ORAL at 12:25

## 2024-11-06 RX ADMIN — ONDANSETRON 4 MG: 2 INJECTION INTRAMUSCULAR; INTRAVENOUS at 12:24

## 2024-11-06 RX ADMIN — AMLODIPINE BESYLATE 10 MG: 10 TABLET ORAL at 09:16

## 2024-11-06 NOTE — PLAN OF CARE
Goal Outcome Evaluation:           Progress: improving  Outcome Evaluation: Patient is POD#6 Of  Humerus Intramedullary Nail/Ariel. Sling in place. Moderate swelling to right arm. Tachycardia most of the time. Ambulating with assistance. Patient has been on the chair all night . Refused to get in bed. Family at bedside. Voiding per purewick. Education provided on pain control and repositioning. Patient verbalized understanding.

## 2024-11-06 NOTE — CONSULTS
CONSULT NOTE    Infectious Diseases - Nancy Bobo MD  [facility]       Patient Identification:  Name: Madelyn Madrid  Age: 84 y.o.  Sex: female  :  1940  MRN: [id number]             Date of Consultation: 2024      Primary Care Physician: Therese Martinez MD                               Requesting Physician: Dr. Dietrich  Reason for Consultation: Right upper extremity postop surgical site infection    History of presenting illness: Patient is a 84-year-old female who has complicated past medical history including metastatic adenocarcinoma of the lung with mets to her bone with pathological fracture affecting her right humerus for which she underwent elective open reduction internal fixation with intramedullary nail insertion and stabilization on 10/31/2024.  Patient has underlying history of COPD/emphysema hypertension anxiety and postoperatively noted to have some hyponatremia.  Postoperatively patient was confused and was seen by neurology and nephrology service and appears to have improvement in her mental status but noted to have increased swelling and redness of the right arm with pain and discomfort getting worse for the last couple days.  Suspicion for postoperative cellulitis of the right arm was made and patient has been started on vancomycin and ceftriaxone and infectious disease service is consulted.  See scanned upper extremity performed which shows increased size of the destructive metastatic lesion in the proximal humerus with air.  Nonspecific osseous enlargement of the tricep musculature along with diffuse soft tissue edema noted.  Patient has been running low-grade temperature starting postop day #3 with progressive leukocytosis.  Orthopedic surgery service have been consulted and infectious diseases consulted.  Impression: This presentation of worsening pain and discomfort and swelling of the upper extremity after intramedullary implant for proximal humerus  pathological fracture with associated radiographic changes and worsening leukocytosis with low-grade fever starting on postop day #4 could very well be due to:  1-mechanical changes associated with operative procedure involving placement of humerus intramedullary implant with attempt to secure and create endosteal seal and hence changes in the CT scan of the right upper extremity causing ulcers enlargement of the humerus and triceps with  2-reactive leukocytosis due to ongoing stress involving the right upper extremity  3-possible evolving cellulitis with erythema and discomfort is for distal from the incision with no obvious evidence of any surgical drainage or erythema around the proximal surgical site  4-increased swelling of the right upper extremity due to above as well as lack of mobility and use of the right upper extremity along with dependency  5-metastatic lung cancer  6-other diagnoses per primary team.    Recommendations/Discussions:  At this juncture I think is not unreasonable to empirically start her on IV vancomycin and ceftriaxone and send for MRSA screen while attempting to elevate upper extremity and orthopedic evaluation.  Even though she has low-grade fever and leukocytosis her overall disposition argues against uncontrolled cellulitis or sepsis with this possibility cannot be ruled out conclusively at this stage in her illness.  Awaiting orthopedic opinion regarding need for intervention versus continued medical management with elevation antibiotic therapy and encouragement of use of hand and forearm if allowed by orthopedic surgery service.  Monitor closely for side effects of antibiotic therapy.  Thank you very much for letting me be the part of your patient care please see above impression and recommendations, will follow this patient with you.            Past Medical History:  Past Medical History:   Diagnosis Date    Arthritis     COPD (chronic obstructive pulmonary disease) 10/2/24    CT  scan    Depression     Emphysema of lung 10_2_24    CT scan    Endometrial cancer     GERD (gastroesophageal reflux disease)     Goiter     Cahto (hard of hearing)     Hx of radiation therapy     GOES 5 TIMES A WEEK    Hypertension     Incontinence of urine     Lung mass     Pancreatitis      Past Surgical History:  Past Surgical History:   Procedure Laterality Date    BRONCHOSCOPY WITH ION ROBOTIC ASSIST N/A 10/23/2024    Procedure: ROBOTIC BRONCHOSCOPY WITH FINE NEEDLE ASPIRATION AND CRYO BIOPSY, BRONCHOSCOPY WITH BRONCHOALVEOLAR LAVAGE;  Surgeon: Deann Iqbal MD;  Location: [facility] ENDOSCOPY;  Service: Robotics - Pulmonary;  Laterality: N/A;    COLONOSCOPY      HYSTERECTOMY      LAPAROSCOPY HYSTEROSCOPY ENDOMETRIAL ABLATION        Home Meds:  Medications Prior to Admission   Medication Sig Dispense Refill Last Dose/Taking    acetaminophen (Tylenol) 325 MG tablet Take 2 tablets by mouth Every 6 (Six) Hours As Needed for Mild Pain.   10/30/2024 at  9:00 PM    amLODIPine (NORVASC) 10 MG tablet Take 1 tablet by mouth Daily.   10/31/2024 at  8:00 AM    escitalopram (LEXAPRO) 20 MG tablet Take 1 tablet by mouth Every Morning.   10/31/2024 at  8:00 AM    OMEPRAZOLE PO Take 20 mg by mouth Daily As Needed.   10/31/2024    traMADol (ULTRAM) 50 MG tablet Take 1 tablet by mouth Every 6 (Six) Hours As Needed for Moderate Pain. 120 tablet 0 10/31/2024     Current Meds:     Current Facility-Administered Medications:     acetaminophen (TYLENOL) tablet 1,000 mg, 1,000 mg, Oral, Q8H, Miles Dietrich MD, 1,000 mg at 11/06/24 0356    amLODIPine (NORVASC) tablet 10 mg, 10 mg, Oral, Daily, Deric Hendricks MD, 10 mg at 11/06/24 0916    [DISCONTINUED] sennosides-docusate (PERICOLACE) 8.6-50 MG per tablet 2 tablet, 2 tablet, Oral, BID PRN **AND** [DISCONTINUED] polyethylene glycol (MIRALAX) packet 17 g, 17 g, Oral, Daily PRN **AND** bisacodyl (DULCOLAX) EC tablet 5 mg, 5 mg, Oral, Daily PRN **AND** bisacodyl (DULCOLAX) suppository  10 mg, 10 mg, Rectal, Daily PRN, Naomi Sanford, APRN    cefTRIAXone (ROCEPHIN) 2,000 mg in sodium chloride 0.9 % 100 mL MBP, 2,000 mg, Intravenous, Q24H, Miles Dietrich MD, Last Rate: 200 mL/hr at 11/05/24 1753, 2,000 mg at 11/05/24 1753    [Held by provider] Enoxaparin Sodium (LOVENOX) syringe 40 mg, 40 mg, Subcutaneous, Nightly, Miles Dietrich MD, 40 mg at 11/04/24 2012    [Held by provider] escitalopram (LEXAPRO) tablet 20 mg, 20 mg, Oral, QAM, Deric Hendricks MD, 20 mg at 11/04/24 0933    hydrOXYzine (ATARAX) tablet 25 mg, 25 mg, Oral, TID PRN, Miles Dietrich MD, 25 mg at 11/05/24 2220    ipratropium-albuterol (DUO-NEB) nebulizer solution 3 mL, 3 mL, Nebulization, Q6H PRN, Naomi Sanford, APRN    ondansetron ODT (ZOFRAN-ODT) disintegrating tablet 4 mg, 4 mg, Oral, Q6H PRN **OR** ondansetron (ZOFRAN) injection 4 mg, 4 mg, Intravenous, Q6H PRN, Naomi Sanford APRN, 4 mg at 11/02/24 1513    pantoprazole (PROTONIX) EC tablet 40 mg, 40 mg, Oral, Q AM, Deric Hendricks MD, 40 mg at 11/06/24 0615    Pharmacy to dose vancomycin, , Does not apply, Continuous PRN, Miles Dietrich MD    polyethylene glycol (MIRALAX) packet 17 g, 17 g, Oral, BID AC, Miles Dietrich MD, 17 g at 11/03/24 1021    sennosides-docusate (PERICOLACE) 8.6-50 MG per tablet 2 tablet, 2 tablet, Oral, BID, Miles Dietrich MD, 2 tablet at 11/06/24 0915    sodium phosphates 15 mmol in 250 mL 0.9% sodium chloride IVPB, 15 mmol, Intravenous, Once, Christy Cruz MD, 15 mmol at 11/06/24 0912    traMADol (ULTRAM) tablet 50 mg, 50 mg, Oral, Q6H PRN, Miles Dietrich MD, 50 mg at 11/06/24 0929    traMADol (ULTRAM) tablet 50 mg, 50 mg, Oral, Once, Miles Dietrich MD    vancomycin (VANCOCIN) 1,000 mg in sodium chloride 0.9 % 250 mL IVPB-VTB, 20 mg/kg, Intravenous, Once, Miles Dietrich MD  Allergies:  Allergies   Allergen Reactions    Other Nausea And Vomiting and GI Intolerance     Opioids--  "    Social History:   Social History     Tobacco Use    Smoking status: Former     Current packs/day: 0.00     Average packs/day: 1 pack/day for 40.0 years (40.0 ttl pk-yrs)     Types: Cigarettes     Start date: 1959     Quit date:      Years since quittin.8     Passive exposure: Past    Smokeless tobacco: Never   Substance Use Topics    Alcohol use: Not Currently      Family History:  Family History   Problem Relation Age of Onset    Rectal cancer Mother     Alzheimer's disease Mother     Cancer Mother         colo-rectal    Cancer Father         Bone    Alzheimer's disease Sister     Alzheimer's disease Brother     Epilepsy Brother     Malig Hyperthermia Neg Hx           Review of Systems  See history of present illness and past medical history.       Vitals:   /78 (BP Location: Left arm, Patient Position: Sitting)   Pulse 103   Temp 98.5 °F (36.9 °C) (Oral)   Resp 16   Ht 157.5 cm (62.01\")   Wt 49 kg (108 lb 0.4 oz)   LMP  (LMP Unknown)   SpO2 98%   BMI 19.75 kg/m²   I/O:   Intake/Output Summary (Last 24 hours) at 2024 1025  Last data filed at 2024 0920  Gross per 24 hour   Intake 1740 ml   Output 800 ml   Net 940 ml     Exam:  Patient is examined using the personal protective equipment as per guidelines from infection control for this particular patient as enacted.  Hand washing was performed before and after patient interaction.  General Appearance:    Alert, cooperative, no distress, appears stated age   Head:    Normocephalic, without obvious abnormality, atraumatic   Eyes:    PERRL, conjunctivae/corneas clear, EOM's intact, both eyes   Ears:    Normal external ear canals, both ears   Nose:   Nares normal, septum midline, mucosa normal, no drainage    or sinus tenderness   Throat:   Lips, tongue, gums normal; oral mucosa pink and moist   Neck: Supple   Back:     Symmetric, no curvature, ROM normal, no CVA tenderness   Lungs:     Clear to auscultation bilaterally, " respirations unlabored   Chest Wall:    No tenderness or deformity    Heart:  S1-S2 regular   Abdomen:   Soft nontender   Extremities: Right upper extremity swollen with erythematous changes involving the right arm with swelling and redness and erythema distal to the proximal surgical incision.  Mild warmth of the right upper extremity noted.  Capillary refill is intact and sensation in the right hand is intact.    Proximal right upper extremity incision well-approximated with no drainage.   Skin: Erythematous changes and swelling of the right arm noted.   Neurologic: Hard of hearing but oriented x 3       Data Review:    I reviewed the patient's new clinical results.  Results from last 7 days   Lab Units 11/06/24  0325 11/05/24  1157 11/05/24  0319 11/04/24  0324 11/03/24  0353 11/02/24  0806 11/01/24  0835 10/31/24  2017   WBC 10*3/mm3 15.24*  --  13.07* 9.91 11.09* 11.17* 10.90* 11.64*   HEMOGLOBIN g/dL 9.7* 11.0* 9.8* 10.9* 11.8* 12.8 14.4 12.3   PLATELETS 10*3/mm3 315  --  327 325 304 304 417 382     Results from last 7 days   Lab Units 11/06/24  0325 11/05/24  1614 11/05/24  0319 11/05/24  0005 11/04/24  1355 11/04/24  0324 11/03/24  1154 11/03/24  0353 11/02/24  0806 11/01/24  1159 11/01/24  0835   SODIUM mmol/L 132* 133* 136 133*  --  128* 129* 130* 133* 131* 135*   POTASSIUM mmol/L 4.3  --  5.0  --  3.9 5.4*  --  4.5 4.7 4.8 4.9   CHLORIDE mmol/L 98  --  101  --   --  99  --  98 103 97* 97*   CO2 mmol/L 26.4  --  24.4  --   --  21.8*  --  20.2* 19.0* 20.0* 21.1*   BUN mg/dL 15  --  23  --   --  34*  --  33* 31* 20 17   CREATININE mg/dL 0.47*  --  0.54*  --   --  0.73  --  0.84 0.85 0.99 0.92   CALCIUM mg/dL 7.8*  --  7.9*  --   --  8.6  --  8.7 7.7* 8.6 9.2   GLUCOSE mg/dL 102*  --  110*  --   --  99  --  95 132* 157* 156*     Blood cultures pending  MRSA screen pending  CT Upper Extremity Right Without Contrast    Result Date: 11/6/2024   1. Increased size of the large destructive metastatic bone lesion in  the proximal humeral diaphysis with associated pathologic fracture and fixation hardware in place. Air within the marrow space is likely postoperative. 2. Nonspecific diffuse osseous enlargement of the triceps musculature, which could reflect edema, myositis, and/or intramuscular hematoma. 3. Diffuse soft tissue edema with no well-formed or drainable fluid collection definitive for abscess, although evaluation is mild limited by the lack of intravenous contrast. 4. Similar-appearing likely malignant subpleural mass in the posterior and medial right lower lobe and partially visualized likely metastatic right hilar lymphadenopathy, metastatic bone lesions in the thoracic spine, and hepatic metastatic disease.  This report was finalized on 11/6/2024 9:05 AM by Harvey Greer MD on Workstation: [id number]      XR Chest 1 View    Result Date: 11/5/2024  No acute infiltrate. Borderline heart size. Tortuous aorta. Follow-up as clinically indicated.  This report was finalized on 11/5/2024 4:28 PM by Dr. Wilmer Cosme M.D on Workstation: [id number]      XR Chest 1 View    Result Date: 10/31/2024  No acute infiltrate. Cardiomegaly. Tortuous aorta. Follow-up as clinically indicated.  This report was finalized on 10/31/2024 8:57 PM by Dr. Wilmer Cosme M.D on Workstation: [id number]      XR Humerus Right    Result Date: 10/31/2024   Postsurgical changes.    This report was finalized on 10/31/2024 6:24 PM by Dr. Wilmer Cosme M.D on Workstation: [id number]      CT Chest Without Contrast Diagnostic    Addendum Date: 10/24/2024    ADDENDUM #1 ADDENDUM: Heterogeneous enlarged right thyroid lobe with substernal extension. Consider thyroid ultrasound correlation. Electronically Signed: Ale Hebert MD  10/24/2024 12:13 PM EDT  Workstation ID: [id number] ORIGINAL REPORT: CT CHEST WO CONTRAST DIAGNOSTIC Date of Exam: 10/22/2024 12:20 PM EDT Indication: lung mass. Comparison: CT chest 10/2/2024. PET/CT 10/22/2024. Technique: Axial  CT images were obtained of the chest without contrast administration.  Sagittal and coronal reconstructions were performed.  Automated exposure control and iterative reconstruction methods were used. Findings: Soft tissue mass within the medial right lower lobe abutting the mediastinum corresponds to the hypermetabolic lesion on today's PET/CT study and is consistent with malignancy. It measures about 2.8 x 1.8 cm (series 2 image 59). It abuts the posterior margin of the right inferior pulmonary vein and the left atrium. 5 mm noncalcified right middle lobe lung nodule abutting the minor fissure (series 2 image 51), is unchanged. No new pulmonary nodules. Benign calcified nodule in the right upper lobe. Chronic scarring in the lung apices. Mild emphysema. Linear scarring in the lung bases. Heart size is normal. Evaluation of hilar structures is limited due to the lack of IV contrast. Benign calcified lymph nodes are seen within each hilum and the precarinal mediastinum. No pathologically enlarged lymph nodes are seen by CT criteria. Mild thoracic aortic calcific atherosclerosis without aneurysm. Heart size is normal. Coronary artery calcifications are present. No pericardial effusion or pleural effusion. The included portions of the upper abdominal organs demonstrate a normal noncontrast appearance. Osteolytic lesions of the proximal right humerus, T9 vertebrae, T5 vertebrae,, right T5 pedicle, T6 spinous process are consistent with the metastatic disease. There is mild compression deformity involving the superior endplate of T5 with about 10% loss of vertebral body height. There does appear to be some soft tissue mass extension into the posterior spinal canal, best depicted on series 7 image 64, with borderline to mild canal stenosis. Mild compression deformity involving the inferior endplate of T9 with less than 10% loss of vertebral body height. No posterior cortical involvement or bony retropulsion is seen.  Impression: 1. CT chest findings appear similar to the recent CT chest of 10/2/2024. 2. Osteolytic lesions of the right humerus, T5 and T9 vertebrae, right T5 pedicle, T6 spinous process. Mild pathologic compression deformities are noted within the T5 and T9 vertebral bodies, and at the T5 level, there may be some soft tissue extension into the spinal canal without high-grade canal stenosis. 3. Right lower lobe lung mass is hypermetabolic on today's PET/CT imaging and consistent with the appearance of malignancy. 4.. 5 mm indeterminate right middle lobe lung nodule. Attention at surveillance imaging is recommended. Electronically Signed: Ale Hebert MD  10/24/2024 9:54 AM EDT  Workstation ID: [id number]    Result Date: 10/24/2024  Impression: 1. CT chest findings appear similar to the recent CT chest of 10/2/2024. 2. Osteolytic lesions of the right humerus, T5 and T9 vertebrae, right T5 pedicle, T6 spinous process. Mild pathologic compression deformities are noted within the T5 and T9 vertebral bodies, and at the T5 level, there may be some soft tissue extension into the spinal canal without high-grade canal stenosis. 3. Right lower lobe lung mass is hypermetabolic on today's PET/CT imaging and consistent with the appearance of malignancy. 4.. 5 mm indeterminate right middle lobe lung nodule. Attention at surveillance imaging is recommended. Electronically Signed: Ale Hebert MD  10/24/2024 9:54 AM EDT  Workstation ID: [id number]    NM PET/CT Skull Base to Mid Thigh    Result Date: 10/24/2024  1. Hypermetabolic right lower lobe lung mass consistent with malignancy. 2. Hypermetabolic right hilar lymph nodes consistent with kirit metastases. Indeterminate uptake within a nonenlarged subcarinal lymph node. 3. Hypermetabolic right liver mass consistent with metastatic disease 4. Multiple hypermetabolic osseous metastatic lesions. Osteolysis of the T5, T9 vertebrae, right hemisacrum, and proximal right humerus may  predispose to pathologic fracture, although no discrete fracture is seen at this time. 5. Additional findings as described above. Electronically Signed: Ale Hebert MD  10/24/2024 9:17 AM EDT  Workstation ID: [id number]    XR Chest 1 View    Result Date: 10/23/2024  Impression: 1.No acute process identified. Electronically Signed: See Elizondo MD  10/23/2024 9:09 AM EDT  Workstation ID: [id number]    MRI Brain With & Without Contrast    Result Date: 10/21/2024  Impression: No acute findings or evidence of intracranial metastatic disease. Moderate typical chronic and age-related changes are present. 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         Plan:  See above  Nancy Soria MD   11/6/2024  10:25 EST    Parts of this note may be an electronic transcription/translation of spoken language to printed text using the Dragon dictation system.

## 2024-11-06 NOTE — THERAPY TREATMENT NOTE
Patient Name: Madelyn Madrid  : 1940    MRN: [id number]                              Today's Date: 2024       Admit Date: 10/31/2024    Visit Dx:     ICD-10-CM ICD-9-CM   1. 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    Surgery, elective    Hypertension    GERD (gastroesophageal reflux disease)    Depression    COPD (chronic obstructive pulmonary disease)    Severe malnutrition     Past Medical History:   Diagnosis Date    Arthritis     COPD (chronic obstructive pulmonary disease) 10/2/24    CT scan    Depression     Emphysema of lung     CT scan    Endometrial cancer     GERD (gastroesophageal reflux disease)     Goiter     [place] (hard of hearing)     Hx of radiation therapy     GOES 5 TIMES A WEEK    Hypertension     Incontinence of urine     Lung mass     Pancreatitis      Past Surgical History:   Procedure Laterality Date    BRONCHOSCOPY WITH ION ROBOTIC ASSIST N/A 10/23/2024    Procedure: ROBOTIC BRONCHOSCOPY WITH FINE NEEDLE ASPIRATION AND CRYO BIOPSY, BRONCHOSCOPY WITH BRONCHOALVEOLAR LAVAGE;  Surgeon: Deann Iqbal MD;  Location: [facility] ENDOSCOPY;  Service: Robotics - Pulmonary;  Laterality: N/A;    COLONOSCOPY      HYSTERECTOMY      LAPAROSCOPY HYSTEROSCOPY ENDOMETRIAL ABLATION        General Information       Row Name 24 1032          Physical Therapy Time and Intention    Document Type therapy note (daily note)  -CB     Mode of Treatment physical therapy  -CB       Row Name 24 1032          General Information    Existing Precautions/Restrictions fall  R NWB and in sling  -CB       Row Name 24 1032          Safety Issues/Impairments Affecting Functional Mobility    Comment,  Safety Issues/Impairments (Mobility) RUE warm, edema, and painful. pt agreeable to LE ther ex  -CB               User Key  (r) = Recorded By, (t) = Taken By, (c) = Cosigned By      Initials Name Provider Type    Kylie Mensah, PT Physical Therapist                   Mobility       Row Name 11/06/24 1033          Bed Mobility    Comment, (Bed Mobility) [facility]  -CB               User Key  (r) = Recorded By, (t) = Taken By, (c) = Cosigned By      Initials Name Provider Type    Kylie Mensah, PT Physical Therapist                   Obj/Interventions       Row Name 11/06/24 1033          Motor Skills    Therapeutic Exercise --  QS, GS, SLR, HS, AP x10 reps with rest breaks as needed  -CB               User Key  (r) = Recorded By, (t) = Taken By, (c) = Cosigned By      Initials Name Provider Type    Kylie Mensah, PT Physical Therapist                   Goals/Plan    No documentation.                  Clinical Impression       Row Name 11/06/24 1033          Pain    Pretreatment Pain Rating 6/10  -CB     Posttreatment Pain Rating 6/10  -CB     Pain Location shoulder  -CB     Pain Side/Orientation right  -CB       Row Name 11/06/24 1033          Plan of Care Review    Plan of Care Reviewed With patient  -CB     Progress no change  -CB     Outcome Evaluation Patient is agreeable to ther ex while she is sitting [facility] this AM. RUE noted edema, warm, and painful. She completed ther ex while in chair with VC for correct technique and demo. Family at bedside and states they have pt complete AP during the day. Will progerss as pt tolerates with PT.  -CB       Row Name 11/06/24 1033          Therapy Assessment/Plan (PT)    Therapy Frequency (PT) 5 times/wk  -CB       Row Name 11/06/24 1033          Positioning and Restraints    Pre-Treatment Position sitting in chair/recliner  -CB     Post Treatment Position chair  -CB     In Chair notified nsg;reclined;call light within reach;encouraged to call for assist;with  family/caregiver;exit alarm on;RUE elevated;legs elevated  -CB               User Key  (r) = Recorded By, (t) = Taken By, (c) = Cosigned By      Initials Name Provider Type    Kylie Mensah, PT Physical Therapist                   Outcome Measures       Row Name 11/06/24 1035 11/06/24 0916       How much help from another person do you currently need...    Turning from your back to your side while in flat bed without using bedrails? 3  -CB 3  -OD    Moving from lying on back to sitting on the side of a flat bed without bedrails? 3  -CB 3  -OD    Moving to and from a bed to a chair (including a wheelchair)? 3  -CB 3  -OD    Standing up from a chair using your arms (e.g., wheelchair, bedside chair)? 3  -CB 3  -OD    Climbing 3-5 steps with a railing? 2  -CB 2  -OD    To walk in hospital room? 3  -CB 3  -OD    AM-PAC 6 Clicks Score (PT) 17  -CB 17  -OD    Highest Level of Mobility Goal 5 --> Static standing  -CB 5 --> Static standing  -OD      Row Name 11/06/24 1035          Functional Assessment    Outcome Measure Options AM-PAC 6 Clicks Basic Mobility (PT)  -CB               User Key  (r) = Recorded By, (t) = Taken By, (c) = Cosigned By      Initials Name Provider Type    Kylie Mensah, PT Physical Therapist    Delfina Batista RN Registered Nurse                                 Physical Therapy Education       Title: PT OT SLP Therapies (Done)       Topic: Physical Therapy (Done)       Point: Mobility training (Done)       Learning Progress Summary            Patient Acceptance, E,D, VU,NR by MS at 11/5/2024 1019    Acceptance, E,D, VU,NR by MS at 11/4/2024 1005    Acceptance, E, VU,NR by CN at 11/2/2024 1623                      Point: Home exercise program (Done)       Learning Progress Summary            Patient Acceptance, E,TB,D, VU,NR by CB at 11/6/2024 1036    Acceptance, E,D, VU,NR by MS at 11/4/2024 1005    Acceptance, E, VU,NR by CN at 11/2/2024 1623                      Point: Body mechanics  (Done)       Learning Progress Summary            Patient Acceptance, E,D, VU,NR by MS at 11/5/2024 1019    Acceptance, E,D, VU,NR by MS at 11/4/2024 1005    Acceptance, E, VU,NR by CN at 11/2/2024 1623                      Point: Precautions (Done)       Learning Progress Summary            Patient Acceptance, E,D, VU,NR by MS at 11/5/2024 1019    Acceptance, E,D, VU,NR by MS at 11/4/2024 1005    Acceptance, E, VU,NR by CN at 11/2/2024 1623                                      User Key       Initials Effective Dates Name Provider Type Discipline    MS 06/16/21 -  Dwain Hendricks, PT Physical Therapist PT    CN 06/16/21 -  Meg Alan, PT Physical Therapist PT    CB 10/22/21 -  Kylie Corcoran, PT Physical Therapist PT                  PT Recommendation and Plan     Progress: no change  Outcome Evaluation: Patient is agreeable to ther ex while she is sitting [facility] this AM. RUE noted edema, warm, and painful. She completed ther ex while in chair with VC for correct technique and demo. Family at bedside and states they have pt complete AP during the day. Will progerss as pt tolerates with PT.     Time Calculation:         PT Charges       Row Name 11/06/24 1036             Time Calculation    Start Time 0934  -CB      Stop Time 0943  -CB      Time Calculation (min) 9 min  -CB      PT Received On 11/06/24  -CB      PT - Next Appointment 11/07/24  -CB         Time Calculation- PT    Total Timed Code Minutes- PT 9 minute(s)  -CB         Timed Charges    73591 - PT Therapeutic Exercise Minutes 9  -CB         Total Minutes    Timed Charges Total Minutes 9  -CB       Total Minutes 9  -CB                User Key  (r) = Recorded By, (t) = Taken By, (c) = Cosigned By      Initials Name Provider Type    CB Kylie Corcoran, PT Physical Therapist                  Therapy Charges for Today       Code Description Service Date Service Provider Modifiers Qty    [id number] HC PT THER PROC EA 15 MIN 11/6/2024 Kylie Corcoran,  PT GP 1            PT G-Codes  Outcome Measure Options: AM-PAC 6 Clicks Basic Mobility (PT)  AM-PAC 6 Clicks Score (PT): 17  AM-PAC 6 Clicks Score (OT): 7       Kylie Corcoran, PT  11/6/2024

## 2024-11-06 NOTE — PLAN OF CARE
Goal Outcome Evaluation:  Plan of Care Reviewed With: patient   Vss, max temp 99.7, pt c/o chills this shift, prn meds given for pain with noted relief, dressing c/d/I, moderate swelling on RUE, sling in place, ambulating assist x1-2, voiding per pw, bm this shift, plans to d/c to SNF, educated on bp meds and monitoring.      Progress: improving

## 2024-11-06 NOTE — SIGNIFICANT NOTE
11/06/24 0943   OTHER   Discipline occupational therapist   Rehab Time/Intention   Session Not Performed other (see comments)  (The pt reported significant pain in her R arm with increased edema and redness. OT to hold any ROM today. She politely declined any upright activity.)   Recommendation   OT - Next Appointment 11/07/24

## 2024-11-06 NOTE — PROGRESS NOTES
"[facility] Clinical Pharmacy Services: Vancomycin Pharmacokinetic Initial Consult Note    Madelyn Madrid is a 84 y.o. female who is on day 1 of pharmacy to dose vancomycin.    Indication: Skin and Soft Tissue  Consulting Provider: Dr. Dietrich  Planned Duration of Therapy: 14 days  Loading Dose Ordered or Given: 1000 mg on 11/6 at 1224  Culture/Source:   11/5 Bcx x2: In process  11/6 MRSA PCR: Not detected  Target: -600 mg/L.hr   Other Antimicrobials: Ceftriaxone    Vitals/Labs  Ht: 157.5 cm (62.01\"); Wt: 49 kg (108 lb 0.4 oz)  Temp Readings from Last 1 Encounters:   11/06/24 99.8 °F (37.7 °C) (Oral)    Estimated Creatinine Clearance: 71.4 mL/min (A) (by C-G formula based on SCr of 0.47 mg/dL (L)).     Results from last 7 days   Lab Units 11/06/24  0325 11/05/24  0319 11/04/24  0324   CREATININE mg/dL 0.47* 0.54* 0.73   WBC 10*3/mm3 15.24* 13.07* 9.91     Assessment/Plan:    Vancomycin Dose:   750 mg IV every  12  hours  Predictive AUC level for the dose ordered is 497 mg/L.hr, which is within the target of 400-600 mg/L.hr  Vanc Trough has been ordered for 11/8 at 1130     Pharmacy will follow patient's kidney function and will adjust doses and obtain levels as necessary. Thank you for involving pharmacy in this patient's care. Please contact pharmacy with any questions or concerns.                           Dru Gallo, [facility]  Clinical Pharmacist    "

## 2024-11-06 NOTE — PLAN OF CARE
Goal Outcome Evaluation:           Progress: no change  Outcome Evaluation: A&Ox4. Very [place]. POD 6 R humurus IM nailing. NWB RUE. CT RUE today, possible infection/abscess. Aquacell intact. No drainage. RUE red, hot, pitting edema. Tachycardic and febrile tmax 99.8 today. Sepsis workup 11/5 negative, Blood cx pending. Ortho reconsulted. ID consulted. IV rocephin and vanc given. Next vanc trough 11/8 at 11:30. Complaints of nausea and pain today. PRN zofran and tramadol given. Assist x 1 BRP. Voids per brief. Educated on elevating RUE with pillow. Plans DC SNF, referrals pending.

## 2024-11-06 NOTE — PROGRESS NOTES
[facility] Progress Note      Patient Name: Madelyn Madrid  : 1940  MRN: [id number]  Primary Care Physician:  Therese Martinez MD  Date of admission: 10/31/2024    Subjective     Interval History:   Follow-up hyponatremia.  Drinking more.  Trying to take in boost .  Blood pressure stable.  Tmax 100.  Orthopedic evaluation of right upper extremity noted.  Review of Systems:   As noted above    Objective     Vitals:   Temp:  [98.2 °F (36.8 °C)-100 °F (37.8 °C)] 98.5 °F (36.9 °C)  Heart Rate:  [103-109] 103  Resp:  [16] 16  BP: (119-145)/(56-78) 145/78    Intake/Output Summary (Last 24 hours) at 2024 1054  Last data filed at 2024 0920  Gross per 24 hour   Intake 1740 ml   Output 800 ml   Net 940 ml       Physical Exam:    General: Chronically ill.  Sitting up in the chair.  Neck no JVD  HEENT oral mucosa very dry.  Nonicteric sclera  Lungs clear to auscultation bilaterally.  Heart: Regular rate and rhythm no S3 or rub.  Abdomen:+ Bowel sounds,  soft nontender  Extremities no edema  Neuro: Awake and alert.  Speech normal.      Scheduled Meds:     acetaminophen, 1,000 mg, Oral, Q8H  amLODIPine, 10 mg, Oral, Daily  cefTRIAXone, 2,000 mg, Intravenous, Q24H  [Held by provider] enoxaparin, 40 mg, Subcutaneous, Nightly  [Held by provider] escitalopram, 20 mg, Oral, QAM  pantoprazole, 40 mg, Oral, Q AM  polyethylene glycol, 17 g, Oral, BID AC  senna-docusate sodium, 2 tablet, Oral, BID  sodium phosphate, 15 mmol, Intravenous, Once  traMADol, 50 mg, Oral, Once  vancomycin, 20 mg/kg, Intravenous, Once      IV Meds:   Pharmacy to dose vancomycin,         Results Reviewed:   I have personally reviewed the results from the time of this admission to 2024 10:54 EST     Results from last 7 days   Lab Units 24  0325 24  1614 24  0319 24  0005 24  1355 24  0324 24  1154 24  0353 24  0835 10/31/24  2017   SODIUM mmol/L 132* 133*  136   < >  --  128*   < > 130*   < > 135*   POTASSIUM mmol/L 4.3  --  5.0  --  3.9 5.4*  --  4.5   < > 3.3*   CHLORIDE mmol/L 98  --  101  --   --  99  --  98   < > 95*   CO2 mmol/L 26.4  --  24.4  --   --  21.8*  --  20.2*   < > 23.6   BUN mg/dL 15  --  23  --   --  34*  --  33*   < > 10   CREATININE mg/dL 0.47*  --  0.54*  --   --  0.73  --  0.84   < > 0.78   CALCIUM mg/dL 7.8*  --  7.9*  --   --  8.6  --  8.7   < > 9.0   BILIRUBIN mg/dL 0.2  --   --   --   --   --   --  0.4  --  0.4   ALK PHOS U/L 131*  --   --   --   --   --   --  78  --  102   ALT (SGPT) U/L 6  --   --   --   --   --   --  7  --  11   AST (SGOT) U/L 18  --   --   --   --   --   --  27  --  47*   GLUCOSE mg/dL 102*  --  110*  --   --  99  --  95   < > 146*    < > = values in this interval not displayed.       Estimated Creatinine Clearance: 71.4 mL/min (A) (by C-G formula based on SCr of 0.47 mg/dL (L)).    Results from last 7 days   Lab Units 11/06/24 0325 11/05/24 0319 11/04/24 0324   MAGNESIUM mg/dL 1.6 1.8 2.2   PHOSPHORUS mg/dL 2.1* 2.9 2.0*             Results from last 7 days   Lab Units 11/06/24 0325 11/05/24  1157 11/05/24 0319 11/04/24 0324 11/03/24  0353 11/02/24  0806   WBC 10*3/mm3 15.24*  --  13.07* 9.91 11.09* 11.17*   HEMOGLOBIN g/dL 9.7* 11.0* 9.8* 10.9* 11.8* 12.8   PLATELETS 10*3/mm3 315  --  327 325 304 304             Assessment / Plan     ASSESSMENT:  Hyponatremia.  TSH and cortisol normal.  Sodium 132.    Urine studies look more consistent with a prerenal picture.  Discontinued fluid restriction yesterday.  I think that she is stable enough above 130 to be acceptable.  Fluid restricting her really limits her quality of life.  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  Right humerus fracture.  Status post right humeral intramedullary nail 11/4/2024.  Hypertension controlled.  4.  Chronic heart failure preserved ejection fraction.  Grade 1 dysfunction on echo 9/24.  Compensated.  PLAN:  Monitor  chemistries.  2.  Encouraged drinking some boost rather than just plain water or soft drinks.  3.  Discussed with patient and family at bedside.  Thank you for involving us in the care of Madelyn Madrid.  Please feel free to call with any questions.    Christy Cruz MD  11/06/24  10:54 EST    [facility]  [phone]    Please note that portions of this note were completed with a voice recognition program.

## 2024-11-06 NOTE — PROGRESS NOTES
Nutrition Services    Patient Name:  Madelyn Madrid  YOB: 1940  MRN: [id number]  Admit Date:  10/31/2024    Nutrition Services    Patient Name: Madelyn Madrid  YOB: 1940  MRN: [id number]  Admission date: 10/31/2024    PROGRESS NOTE      Encounter Information: Follow up on PO intakes:    Pt reports appetite is improving. Eating 50-75% per EMR. Dislikes novasource renal and prefers the chocolate boost original per pt. Will honor pt's preference to help with PO intake.        PO Diet: Diet: Regular/House; Fluid Consistency: Thin (IDDSI 0)   PO Supplements: -Magic cup once daily (dinner)  -Novasource renal TID (B/L/D)   PO Intake:         Current nutrition support: ---   Nutrition support review: ---       Labs (reviewed below): Na 132, K 4.3, Ca 7.8, P 2.1, hgb 9.7       GI Function:  WDL. Last BM: 11/5       Nutrition Intervention Updates: - Switch to boost original chocolate BID to help with PO intake per pt's preference  - Encourage good PO intake       Results from last 7 days   Lab Units 11/06/24  0325 11/05/24  1614 11/05/24  0319 11/05/24  0005 11/04/24  1355 11/04/24  0324 11/03/24  1154 11/03/24  0353 11/01/24  0835 10/31/24  2017   SODIUM mmol/L 132* 133* 136   < >  --  128*   < > 130*   < > 135*   POTASSIUM mmol/L 4.3  --  5.0  --  3.9 5.4*  --  4.5   < > 3.3*   CHLORIDE mmol/L 98  --  101  --   --  99  --  98   < > 95*   CO2 mmol/L 26.4  --  24.4  --   --  21.8*  --  20.2*   < > 23.6   BUN mg/dL 15  --  23  --   --  34*  --  33*   < > 10   CREATININE mg/dL 0.47*  --  0.54*  --   --  0.73  --  0.84   < > 0.78   CALCIUM mg/dL 7.8*  --  7.9*  --   --  8.6  --  8.7   < > 9.0   BILIRUBIN mg/dL  --   --   --   --   --   --   --  0.4  --  0.4   ALK PHOS U/L  --   --   --   --   --   --   --  78  --  102   ALT (SGPT) U/L  --   --   --   --   --   --   --  7  --  11   AST (SGOT) U/L  --   --   --   --   --   --   --  27  --  47*   GLUCOSE mg/dL 102*  --  110*  --   --  99  --  95    "< > 146*    < > = values in this interval not displayed.     Results from last 7 days   Lab Units 11/06/24  0325 11/05/24  1157 11/05/24  0319 11/04/24  0324   MAGNESIUM mg/dL 1.6  --  1.8 2.2   PHOSPHORUS mg/dL 2.1*  --  2.9 2.0*   HEMOGLOBIN g/dL 9.7*   < > 9.8* 10.9*   HEMATOCRIT % 29.7*   < > 30.5* 33.4*    < > = values in this interval not displayed.     No results found for: \"COVID19\"  No results found for: \"HGBA1C\"    Wt Readings from Last 10 Encounters:   11/04/24 1049 49 kg (108 lb 0.4 oz)   10/30/24 1230 49 kg (108 lb)   10/28/24 1351 49.4 kg (109 lb)   10/23/24 0735 50 kg (110 lb 3.7 oz)   10/17/24 0920 49.9 kg (110 lb)   10/21/24 1343 49.9 kg (110 lb)   10/16/24 1325 50.3 kg (111 lb)   10/09/24 1343 49.7 kg (109 lb 9.6 oz)   09/30/24 1432 49.9 kg (110 lb)   09/25/24 1317 49.6 kg (109 lb 6.4 oz)   09/16/24 1440 52.2 kg (115 lb)         RD to follow up per protocol.    Electronically signed by:  Bharti Eisenberg RD  11/06/24 10:41 EST  "

## 2024-11-06 NOTE — PROGRESS NOTES
Called by nursing staff regarding CT of the RUE.  I do not see an obvious abscess.  The muscular changes are likely post-surgical.  There is a possibility that some of the medium injected into the balloon may have unknowingly extravasated out through perforation in the balloon.  Will assess clinically, this afternoon.    Elmo Altman M.D.

## 2024-11-06 NOTE — PLAN OF CARE
Goal Outcome Evaluation:  Plan of Care Reviewed With: patient        Progress: no change  Outcome Evaluation: Patient is agreeable to ther ex while she is sitting [facility] this AM. RUE noted edema, warm, and painful. She completed ther ex while in chair with VC for correct technique and demo. Family at bedside and states they have pt complete AP during the day. Will progerss as pt tolerates with PT.

## 2024-11-06 NOTE — PROGRESS NOTES
Name: Madelyn Madrid ADMIT: 10/31/2024   : 1940  PCP: Therese Martinez MD    MRN: [id number] LOS: 3 days   AGE/SEX: 84 y.o. female  ROOM: [facility]     Subjective   Subjective   Patient seen this morning. Daughter present at bedside .  Reports she is feeling better, p.o. intake improving.  Tremors much better.  This morning patient denied fevers or chills.  Pain was controlled.    Review of Systems   As above  Objective   Objective   Vital Signs  Temp:  [98.1 °F (36.7 °C)-100 °F (37.8 °C)] 98.5 °F (36.9 °C)  Heart Rate:  [102-109] 103  Resp:  [16] 16  BP: (119-145)/(55-78) 145/78  SpO2:  [94 %-98 %] 98 %  on   ;   Device (Oxygen Therapy): room air  Body mass index is 19.75 kg/m².  Physical Exam    General: Alert, laying in bed, not in distress,, ill-appearing  HEENT: Normocephalic, atraumatic  CV: Tachycardic, regular rhythm  Lungs: CTA anteriorly, no wheezing  Abdomen: Soft, nontender, nondistended  Extremities: Right upper extremity splint removed.  Patient with diffuse swelling/mild erythema, warm to touch and upper arm.    Results Review     I reviewed the patient's new clinical results.  Results from last 7 days   Lab Units 24  0325 24  1157 24  0319 24  0324 24  0353   WBC 10*3/mm3 15.24*  --  13.07* 9.91 11.09*   HEMOGLOBIN g/dL 9.7* 11.0* 9.8* 10.9* 11.8*   PLATELETS 10*3/mm3 315  --  327 325 304     Results from last 7 days   Lab Units 24  0325 24  1614 24  0319 24  0005 24  1355 24  0324 24  1154 24  0353   SODIUM mmol/L 132* 133* 136 133*  --  128*   < > 130*   POTASSIUM mmol/L 4.3  --  5.0  --  3.9 5.4*  --  4.5   CHLORIDE mmol/L 98  --  101  --   --  99  --  98   CO2 mmol/L 26.4  --  24.4  --   --  21.8*  --  20.2*   BUN mg/dL 15  --  23  --   --  34*  --  33*   CREATININE mg/dL 0.47*  --  0.54*  --   --  0.73  --  0.84   GLUCOSE mg/dL 102*  --  110*  --   --  99  --  95    < > = values in this interval not  "displayed.   Estimated Creatinine Clearance: 71.4 mL/min (A) (by C-G formula based on SCr of 0.47 mg/dL (L)).  Results from last 7 days   Lab Units 11/06/24  0325 11/03/24  0353 11/02/24  0806 10/31/24  2017   ALBUMIN g/dL 1.9* 2.4* 2.7* 3.5   BILIRUBIN mg/dL  --  0.4  --  0.4   ALK PHOS U/L  --  78  --  102   AST (SGOT) U/L  --  27  --  47*   ALT (SGPT) U/L  --  7  --  11     Results from last 7 days   Lab Units 11/06/24  0325 11/05/24  0319 11/04/24  0324 11/03/24  0353 11/02/24  0806 11/01/24  0835 10/31/24  2017   CALCIUM mg/dL 7.8* 7.9* 8.6 8.7 7.7*   < > 9.0   ALBUMIN g/dL 1.9*  --   --  2.4* 2.7*  --  3.5   MAGNESIUM mg/dL 1.6 1.8 2.2  --  2.0   < >  --    PHOSPHORUS mg/dL 2.1* 2.9 2.0*  --  3.2   < >  --     < > = values in this interval not displayed.     Results from last 7 days   Lab Units 11/05/24 0319   PROCALCITONIN ng/mL 10.40*   No results found for: \"COVID19\"  No results found for: \"HGBA1C\", \"POCGLU\"        CT Upper Extremity Right Without Contrast  Narrative: CT UPPER EXTREMITY RIGHT WO CONTRAST-     DATE OF EXAM: 11/6/2024 7:51 AM     INDICATION: Pathologic fracture of right humerus status post surgery.   Ordered for signs of infection/abscess.     COMPARISON: Chest radiographs 11/5/2024 and 10/31/2024. Right humerus  radiographs 10/31/2024. PET/CT 10/22/2024. CT chest 10/22/2024.     TECHNIQUE: Multiple contiguous axial images were acquired through the  right upper extremity from above the shoulder through the humerus,  elbow, and forearm to the radiocarpal joint without the intravenous  administration of contrast. Reformatted coronal and sagittal sequences  were also reviewed. Radiation dose reduction techniques were utilized,  including automated exposure control and exposure modulation based on  body size.     FINDINGS:  Increased size of the large solid destructive mass in the proximal right  femoral diaphysis, now measuring 5.7 cm x 3.5 cm (axial series 3 image  57), previously 4.5 cm x 3.1 " cm. The mass measures approximately 7 cm in  length (sagittal series 7 image 43). The mass results in mass effect on  the adjacent deltoid and biceps musculature with no fat plane between  the mass and the musculature. Associated lytic destructive osseous  changes of the proximal humerus and mildly displaced pathologic fracture  with up to 1 cm posterior displacement and impaction of the distal  fracture fragment. [employer] bone stabilization hardware in place,  across the metastatic lesion, the pathologic fracture, and throughout  the humeral marrow space. Likely postoperative air in the bone marrow  space.     Diffuse anasarca and soft tissue edema, greatest throughout the right  upper extremity. Heterogeneous enlargement of the triceps musculature,  which could represent edema, myositis, and/or intramuscular hematoma. No  well-formed or drainable fluid collection definitive for abscess.  Partially imaged large heterogeneous thyroid goiter. Similar-appearing  2.5 cm subpleural mass in the infrahilar posterior and medial right  lower lobe, probable primary lung malignancy, with partially visualized  right hilar metastatic lymphadenopathy. Right basilar subsegmental  atelectasis and/or scarring. Similar-appearing chronic emphysematous  changes in the right lung with right apical pleural-parenchymal  scarring. Incompletely imaged heterogeneous low-attenuation metastatic  liver lesion in the anterior right lobe of the liver.     Incompletely imaged lytic metastatic bone lesions in the T5 and T9  vertebral bodies with associated pathologic fracture.     Mild to moderate glenohumeral joint DJD. No significant glenohumeral  joint effusion. Mild to moderate DJD at the acromioclavicular joint.  Mild DJD at the elbow. Neutral ulnar variance.     Impression:    1. Increased size of the large destructive metastatic bone lesion in the  proximal humeral diaphysis with associated pathologic fracture and  fixation hardware in  place. Air within the marrow space is likely  postoperative.  2. Nonspecific diffuse osseous enlargement of the triceps musculature,  which could reflect edema, myositis, and/or intramuscular hematoma.  3. Diffuse soft tissue edema with no well-formed or drainable fluid  collection definitive for abscess, although evaluation is mild limited  by the lack of intravenous contrast.  4. Similar-appearing likely malignant subpleural mass in the posterior  and medial right lower lobe and partially visualized likely metastatic  right hilar lymphadenopathy, metastatic bone lesions in the thoracic  spine, and hepatic metastatic disease.     This report was finalized on 11/6/2024 9:05 AM by Harvey Greer MD on  Workstation: [id number]       Scheduled Medications  acetaminophen, 1,000 mg, Oral, Q8H  amLODIPine, 10 mg, Oral, Daily  cefTRIAXone, 2,000 mg, Intravenous, Q24H  [Held by provider] enoxaparin, 40 mg, Subcutaneous, Nightly  [Held by provider] escitalopram, 20 mg, Oral, QAM  pantoprazole, 40 mg, Oral, Q AM  polyethylene glycol, 17 g, Oral, BID AC  senna-docusate sodium, 2 tablet, Oral, BID  sodium phosphate, 15 mmol, Intravenous, Once  traMADol, 50 mg, Oral, Once  vancomycin, 20 mg/kg, Intravenous, Once    Infusions  Pharmacy to dose vancomycin,       Diet  Diet: Regular/House; Fluid Consistency: Thin (IDDSI 0)    I have personally reviewed     [x]  Laboratory   []  Microbiology   []  Radiology   []  EKG/Telemetry  []  Cardiology/Vascular   []  Pathology    []  Records       Assessment/Plan     Active Hospital Problems    Diagnosis  POA    **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     Ms. Madrid is a 84 y.o. 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 Post operatively, she was confused and unable to weaned off of her oxygen, and so [facility] was asked to admit her to the hospital overnight.      Postop infection  -CT scan of the right upper arm showed increased size of the large destructive metastatic bone lesion in the  proximal humeral diaphysis with associated pathologic fracture and fixation hardware in place. Air within the marrow space is likely  postoperative.   Diffuse soft tissue edema with no well-formed or drainable fluid collection definitive for abscess, although evaluation is mild limited by the lack of intravenous contrast.  -WBC trending up.  Continue with ceftriaxone, add vancomycin.  Consult infectious disease.      Anemia  --Hemoglobin on admission was 14.4  -Hemoglobin 9.7 this morning, hemoglobin was 9.8 11/5/2024  -CT showing hematoma as above  -Holding Lovenox      Hypoxemia-  -minor. Likely related to anesthesia.   -Chest xray is negative.  ABG unremarkable.  -Weaned down to room air  -Encourage I-S-    Anxiety  -Continue as needed Atarax      Encephalopathy-  -likely toxic related to anesthesia.  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  Discussed with family about port placement while she is in the hospital, they would like to defer for placement until she is discharged from rehab.    Hyponatremia  -Escitalopram on hold  -Sodium 132  -Nephrology managing    Generalized tremors  -Spouse reports she started having tremors few weeks ago following lung biopsy  -Continues to have significant tremors.  Partly suspect due to anxiety, initiated on as needed Atarax as above  -Neurology evaluated  -Tremors improved    Hyperkalemia  -BNL    GERD  -Continue PPI.  -      SCDs for DVT prophylaxis.  Full code.  Discussed with patient and nursing staff.  Discussed with family  Disposition: SNF, TBD, discharge will be postponed due to right upper extremity infection  Expected Discharge Date: 11/7/2024; Expected Discharge Time:        Copied text in this note has been reviewed and is accurate as of 11/06/24.         Dictated utilizing Dragon dictation        Miles Dietrich MD  [facility]  11/06/24  09:46 EST

## 2024-11-07 LAB
ALBUMIN SERPL-MCNC: 2.2 G/DL (ref 3.5–5.2)
ANION GAP SERPL CALCULATED.3IONS-SCNC: 7.5 MMOL/L (ref 5–15)
BUN SERPL-MCNC: 11 MG/DL (ref 8–23)
BUN/CREAT SERPL: 25.6 (ref 7–25)
CALCIUM SPEC-SCNC: 7.7 MG/DL (ref 8.6–10.5)
CHLORIDE SERPL-SCNC: 99 MMOL/L (ref 98–107)
CO2 SERPL-SCNC: 27.5 MMOL/L (ref 22–29)
CREAT SERPL-MCNC: 0.43 MG/DL (ref 0.57–1)
CYTO UR: NORMAL
DEPRECATED RDW RBC AUTO: 39.1 FL (ref 37–54)
EGFRCR SERPLBLD CKD-EPI 2021: 97.2 ML/MIN/1.73
ERYTHROCYTE [DISTWIDTH] IN BLOOD BY AUTOMATED COUNT: 13.4 % (ref 12.3–15.4)
GLUCOSE SERPL-MCNC: 101 MG/DL (ref 65–99)
HCT VFR BLD AUTO: 30.2 % (ref 34–46.6)
HGB BLD-MCNC: 9.5 G/DL (ref 12–15.9)
LAB AP CASE REPORT: NORMAL
LAB AP DIAGNOSIS COMMENT: NORMAL
LAB AP SPECIAL STAINS: NORMAL
MAGNESIUM SERPL-MCNC: 1.5 MG/DL (ref 1.6–2.4)
MCH RBC QN AUTO: 25.3 PG (ref 26.6–33)
MCHC RBC AUTO-ENTMCNC: 31.5 G/DL (ref 31.5–35.7)
MCV RBC AUTO: 80.5 FL (ref 79–97)
PATH REPORT.FINAL DX SPEC: NORMAL
PATH REPORT.GROSS SPEC: NORMAL
PHOSPHATE SERPL-MCNC: 3.4 MG/DL (ref 2.5–4.5)
PLATELET # BLD AUTO: 324 10*3/MM3 (ref 140–450)
PMV BLD AUTO: 9.8 FL (ref 6–12)
POTASSIUM SERPL-SCNC: 4 MMOL/L (ref 3.5–5.2)
PROCALCITONIN SERPL-MCNC: 1.44 NG/ML (ref 0–0.25)
RBC # BLD AUTO: 3.75 10*6/MM3 (ref 3.77–5.28)
SODIUM SERPL-SCNC: 134 MMOL/L (ref 136–145)
WBC NRBC COR # BLD AUTO: 17.68 10*3/MM3 (ref 3.4–10.8)

## 2024-11-07 PROCEDURE — 77336 RADIATION PHYSICS CONSULT: CPT | Performed by: STUDENT IN AN ORGANIZED HEALTH CARE EDUCATION/TRAINING PROGRAM

## 2024-11-07 PROCEDURE — 84145 PROCALCITONIN (PCT): CPT | Performed by: HOSPITALIST

## 2024-11-07 PROCEDURE — 25010000002 VANCOMYCIN 750 MG RECONSTITUTED SOLUTION 1 EACH VIAL: Performed by: STUDENT IN AN ORGANIZED HEALTH CARE EDUCATION/TRAINING PROGRAM

## 2024-11-07 PROCEDURE — 25010000002 MAGNESIUM SULFATE 2 GM/50ML SOLUTION: Performed by: INTERNAL MEDICINE

## 2024-11-07 PROCEDURE — 97530 THERAPEUTIC ACTIVITIES: CPT

## 2024-11-07 PROCEDURE — 85027 COMPLETE CBC AUTOMATED: CPT | Performed by: STUDENT IN AN ORGANIZED HEALTH CARE EDUCATION/TRAINING PROGRAM

## 2024-11-07 PROCEDURE — 25010000002 CEFTRIAXONE PER 250 MG: Performed by: STUDENT IN AN ORGANIZED HEALTH CARE EDUCATION/TRAINING PROGRAM

## 2024-11-07 PROCEDURE — 25810000003 SODIUM CHLORIDE 0.9 % SOLUTION 250 ML FLEX CONT: Performed by: STUDENT IN AN ORGANIZED HEALTH CARE EDUCATION/TRAINING PROGRAM

## 2024-11-07 PROCEDURE — 83735 ASSAY OF MAGNESIUM: CPT | Performed by: STUDENT IN AN ORGANIZED HEALTH CARE EDUCATION/TRAINING PROGRAM

## 2024-11-07 PROCEDURE — 80069 RENAL FUNCTION PANEL: CPT | Performed by: INTERNAL MEDICINE

## 2024-11-07 PROCEDURE — 97110 THERAPEUTIC EXERCISES: CPT

## 2024-11-07 RX ORDER — TRAMADOL HYDROCHLORIDE 50 MG/1
50 TABLET ORAL EVERY 6 HOURS PRN
Status: DISCONTINUED | OUTPATIENT
Start: 2024-11-07 | End: 2024-11-11 | Stop reason: HOSPADM

## 2024-11-07 RX ORDER — MAGNESIUM SULFATE HEPTAHYDRATE 40 MG/ML
2 INJECTION, SOLUTION INTRAVENOUS ONCE
Status: COMPLETED | OUTPATIENT
Start: 2024-11-07 | End: 2024-11-07

## 2024-11-07 RX ADMIN — ACETAMINOPHEN 1000 MG: 500 TABLET ORAL at 11:56

## 2024-11-07 RX ADMIN — ACETAMINOPHEN 1000 MG: 500 TABLET ORAL at 20:59

## 2024-11-07 RX ADMIN — PANTOPRAZOLE SODIUM 40 MG: 40 TABLET, DELAYED RELEASE ORAL at 05:04

## 2024-11-07 RX ADMIN — VANCOMYCIN HYDROCHLORIDE 750 MG: 750 INJECTION, POWDER, LYOPHILIZED, FOR SOLUTION INTRAVENOUS at 00:22

## 2024-11-07 RX ADMIN — AMLODIPINE BESYLATE 10 MG: 10 TABLET ORAL at 09:21

## 2024-11-07 RX ADMIN — TRAMADOL HYDROCHLORIDE 50 MG: 50 TABLET, COATED ORAL at 03:40

## 2024-11-07 RX ADMIN — VANCOMYCIN HYDROCHLORIDE 750 MG: 750 INJECTION, POWDER, LYOPHILIZED, FOR SOLUTION INTRAVENOUS at 13:33

## 2024-11-07 RX ADMIN — MAGNESIUM SULFATE HEPTAHYDRATE 2 G: 40 INJECTION, SOLUTION INTRAVENOUS at 09:43

## 2024-11-07 RX ADMIN — TRAMADOL HYDROCHLORIDE 50 MG: 50 TABLET, COATED ORAL at 11:56

## 2024-11-07 RX ADMIN — ACETAMINOPHEN 1000 MG: 500 TABLET ORAL at 03:39

## 2024-11-07 RX ADMIN — SENNOSIDES AND DOCUSATE SODIUM 2 TABLET: 50; 8.6 TABLET ORAL at 22:53

## 2024-11-07 RX ADMIN — CEFTRIAXONE 2000 MG: 2 INJECTION, POWDER, FOR SOLUTION INTRAMUSCULAR; INTRAVENOUS at 15:54

## 2024-11-07 RX ADMIN — TRAMADOL HYDROCHLORIDE 50 MG: 50 TABLET, COATED ORAL at 20:59

## 2024-11-07 NOTE — CASE MANAGEMENT/SOCIAL WORK
Continued Stay Note  [facility]     Patient Name: Madelyn Madrid  MRN: [id number]  Today's Date: 11/7/2024    Admit Date: 10/31/2024    Plan: [facility]- accepted and bed available Friday and over the weekend   Discharge Plan       Row Name 11/07/24 1353       Plan    Plan [facility]- accepted and bed available Friday and over the weekend    Plan Comments Spoke with Jina Arias/Jt Reynolds. She confirms that they can accept tomorrow or over the weekend. They have their own pharmacy so she asked that the dc summary be faxed as soon as it is available and their pharmacy will fill the medications. Transfer packet in cubbie.                   Discharge Codes    No documentation.                 Expected Discharge Date and Time       Expected Discharge Date Expected Discharge Time    Nov 14, 2024               Jennifer Medrano RN

## 2024-11-07 NOTE — PLAN OF CARE
Goal Outcome Evaluation:           Progress: improving  Outcome Evaluation: POD 7 R humurus IM nailing. NWB RUE. Dressing cdi. Moderate swelling  Ice and elevate. Tachycardic. Afebrile. IV rocephin and vanc given. Next vanc trough 11/8 at 11:30. Tramadol for pain. Assist x 1 BRP HHA. Incontinent of urine, voiding per purewick. Educated on IS. Plans DC [facility] when medically ready.

## 2024-11-07 NOTE — PLAN OF CARE
Goal Outcome Evaluation:  Plan of Care Reviewed With: patient        Progress: improving  Outcome Evaluation: POD#7 OF Right Humurus Nailing. Dressing to shoulder intact. Sling in place. Moderate swelling to right arm. Tachycardia most of the time. PO pain medication helping with pain. Ambulating with assistance. Voiding fine. Education provided on blood pressure monitoring and pain control. Patient verbalized understanding. BM X1.

## 2024-11-07 NOTE — PLAN OF CARE
Goal Outcome Evaluation:  Plan of Care Reviewed With: patient, family        Progress: improving  Outcome Evaluation: The pt participated in OT/PT this afternoon. She reported that her RUE felt better than the day prior. She completed bed mobility with Min A. Min A x1-2 to stand and mobilize into the hallway a brief distance. She politely declined ADL's and sat in her recliner chair. She participated in BUE/BLE exercises as tolerated. Encouraged her to work on R elbow extension as the pt is becoming very stiff and increased pain is occuring. SNF recommended.    Anticipated Discharge Disposition (OT): skilled nursing facility

## 2024-11-07 NOTE — PROGRESS NOTES
[facility]     LOS: 4 days     Subjective:    CC:No chief complaint on file.    DIET:  Diet Order   Procedures    Diet: Regular/House; Fluid Consistency: Thin (IDDSI 0)     No new complaints, continues to have significant swelling right upper extremity    Objective:    Vital Signs:  Temp:  [97.7 °F (36.5 °C)-98.6 °F (37 °C)] 98.2 °F (36.8 °C)  Heart Rate:  [] 106  Resp:  [16] 16  BP: (126-162)/(65-71) 137/71    SpO2:  [91 %-97 %] 93 %  on   ;   Device (Oxygen Therapy): room air  Body mass index is 19.75 kg/m².    Physical Exam  Constitutional:       General: She is not in acute distress.  Pulmonary:      Effort: Pulmonary effort is normal.      Breath sounds: Normal breath sounds.   Abdominal:      General: There is no distension.      Palpations: Abdomen is soft.      Tenderness: There is no abdominal tenderness.   Musculoskeletal:      Comments: Right upper extremity edema   Skin:     General: Skin is warm and dry.   Neurological:      General: No focal deficit present.      Mental Status: She is alert.   Psychiatric:         Mood and Affect: Mood normal.         Behavior: Behavior normal.         Results Review:    Glucose   Date Value Ref Range Status   11/07/2024 101 (H) 65 - 99 mg/dL Final   11/06/2024 102 (H) 65 - 99 mg/dL Final   11/05/2024 110 (H) 65 - 99 mg/dL Final     Results from last 7 days   Lab Units 11/07/24  0348   WBC 10*3/mm3 17.68*   HEMOGLOBIN g/dL 9.5*   HEMATOCRIT % 30.2*   PLATELETS 10*3/mm3 324     Results from last 7 days   Lab Units 11/07/24  0348 11/06/24  0325   SODIUM mmol/L 134* 132*   POTASSIUM mmol/L 4.0 4.3   CHLORIDE mmol/L 99 98   CO2 mmol/L 27.5 26.4   BUN mg/dL 11 15   CREATININE mg/dL 0.43* 0.47*   CALCIUM mg/dL 7.7* 7.8*   BILIRUBIN mg/dL  --  0.2   ALK PHOS U/L  --  131*   ALT (SGPT) U/L  --  6   AST (SGOT) U/L  --  18   GLUCOSE mg/dL 101* 102*         Results from last 7 days   Lab Units 11/07/24  0348   MAGNESIUM mg/dL 1.5*          Cultures:  Blood Culture   Date Value Ref Range Status   11/05/2024 No growth at 24 hours  Preliminary   11/05/2024 No growth at 24 hours  Preliminary       I have reviewed daily medications and changes in CPOE    Scheduled meds  acetaminophen, 1,000 mg, Oral, Q8H  amLODIPine, 10 mg, Oral, Daily  cefTRIAXone, 2,000 mg, Intravenous, Q24H  [Held by provider] enoxaparin, 40 mg, Subcutaneous, Nightly  [Held by provider] escitalopram, 20 mg, Oral, QAM  pantoprazole, 40 mg, Oral, Q AM  polyethylene glycol, 17 g, Oral, BID AC  senna-docusate sodium, 2 tablet, Oral, BID  traMADol, 50 mg, Oral, Once  vancomycin, 750 mg, Intravenous, Q12H        Pharmacy to dose vancomycin,       PRN meds    [DISCONTINUED] senna-docusate sodium **AND** [DISCONTINUED] polyethylene glycol **AND** bisacodyl **AND** bisacodyl    hydrOXYzine    ipratropium-albuterol    ondansetron ODT **OR** ondansetron    Pharmacy to dose vancomycin    traMADol        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 Olivares    Will discuss with Dr. Altman prior to starting the patient on NSAID      Jesus Jasso MD  11/07/24  15:58 EST

## 2024-11-07 NOTE — PLAN OF CARE
Goal Outcome Evaluation:  Plan of Care Reviewed With: patient           Outcome Evaluation: Upon entering room, pt. supine in bed, awake/alert, and agreeable to work with P.T. this date despite c/o RUE pain.  This PM, pt. able to ambulate 35 feet, Min. assist x 2, with HHA x 1 (LUE).  Pt. requires Min. assist x 1 for bed mobility and Min. assist x 1 for sit <-> stand transfers.  BLE ther. ex. program x 10 reps completed for general strengthening.  Mild unsteadiness throughout upright mobility.  Will continue to progress functional mobility as tolerated.

## 2024-11-07 NOTE — PROGRESS NOTES
"  Infectious Diseases Progress Note    Nancy Soria MD     [facility]  Los: 4 days  Patient Identification:  Name: Madelyn Madrid  Age: 84 y.o.  Sex: female  :  1940  MRN: [id number]         Primary Care Physician: Therese Martinez MD        Subjective: Pain and discomfort in the right arm is slightly better.  Patient's family member at the bedside.  Interval History: See consultation note.    Objective:    Scheduled Meds:acetaminophen, 1,000 mg, Oral, Q8H  amLODIPine, 10 mg, Oral, Daily  cefTRIAXone, 2,000 mg, Intravenous, Q24H  [Held by provider] enoxaparin, 40 mg, Subcutaneous, Nightly  [Held by provider] escitalopram, 20 mg, Oral, QAM  pantoprazole, 40 mg, Oral, Q AM  polyethylene glycol, 17 g, Oral, BID AC  senna-docusate sodium, 2 tablet, Oral, BID  traMADol, 50 mg, Oral, Once  vancomycin, 750 mg, Intravenous, Q12H      Continuous Infusions:Pharmacy to dose vancomycin,         Vital signs in last 24 hours:  Temp:  [97.7 °F (36.5 °C)-99.8 °F (37.7 °C)] 97.7 °F (36.5 °C)  Heart Rate:  [] 96  Resp:  [16] 16  BP: (131-162)/(65-78) 131/71    Intake/Output:    Intake/Output Summary (Last 24 hours) at 2024 0631  Last data filed at 2024 0300  Gross per 24 hour   Intake 920 ml   Output --   Net 920 ml       Exam:  /71 (BP Location: Left arm, Patient Position: Lying)   Pulse 96   Temp 97.7 °F (36.5 °C) (Oral)   Resp 16   Ht 157.5 cm (62.01\")   Wt 49 kg (108 lb 0.4 oz)   LMP  (LMP Unknown)   SpO2 96%   BMI 19.75 kg/m²   Patient is examined using the personal protective equipment as per guidelines from infection control for this particular patient as enacted.  Hand washing was performed before and after patient interaction.  General Appearance:  Emaciated chronically ill nontoxic-appearing elderly female                          Head:    Normocephalic, without obvious abnormality, atraumatic                           Eyes:    PERRL, conjunctivae/corneas clear, EOM's " intact, both eyes                         Throat:   Lips, tongue, gums normal; oral mucosa pink and moist                           Neck:   Supple, symmetrical, trachea midline, no JVD                         Lungs:    Clear to auscultation bilaterally, respirations unlabored                 Chest Wall:    No tenderness or deformity                          Heart:  S1-S2 regular.                  Abdomen:   Soft nontender                 Extremities: Proximal humerus surgical site clean with no drainage, right arm significantly swollen with erythema extending all the way to her hand from mid arm. Wrist and elbow joint functional.  Able to move fingers and make a fist with her right hand.                        Pulses:   Pulses palpable in all extremities                            Skin: Erythematous changes and swelling of the right arm noted.                  Neurologic: Hard of hearing but oriented x 3       Data Review:    I reviewed the patient's new clinical results.  Results from last 7 days   Lab Units 11/07/24  0348 11/06/24  0325 11/05/24  1157 11/05/24  0319 11/04/24  0324 11/03/24  0353 11/02/24  0806 11/01/24  0835   WBC 10*3/mm3 17.68* 15.24*  --  13.07* 9.91 11.09* 11.17* 10.90*   HEMOGLOBIN g/dL 9.5* 9.7* 11.0* 9.8* 10.9* 11.8* 12.8 14.4   PLATELETS 10*3/mm3 324 315  --  327 325 304 304 417     Results from last 7 days   Lab Units 11/07/24  0348 11/06/24  0325 11/05/24  1614 11/05/24  0319 11/05/24  0005 11/04/24  1355 11/04/24  0324 11/03/24  1154 11/03/24  0353 11/02/24  0806 11/01/24  1159   SODIUM mmol/L 134* 132* 133* 136 133*  --  128* 129* 130* 133* 131*   POTASSIUM mmol/L 4.0 4.3  --  5.0  --  3.9 5.4*  --  4.5 4.7 4.8   CHLORIDE mmol/L 99 98  --  101  --   --  99  --  98 103 97*   CO2 mmol/L 27.5 26.4  --  24.4  --   --  21.8*  --  20.2* 19.0* 20.0*   BUN mg/dL 11 15  --  23  --   --  34*  --  33* 31* 20   CREATININE mg/dL 0.43* 0.47*  --  0.54*  --   --  0.73  --  0.84 0.85 0.99   CALCIUM  mg/dL 7.7* 7.8*  --  7.9*  --   --  8.6  --  8.7 7.7* 8.6   GLUCOSE mg/dL 101* 102*  --  110*  --   --  99  --  95 132* 157*     Microbiology Results (last 10 days)       Procedure Component Value - Date/Time    MRSA Screen, PCR (Inpatient) - Swab, Nares [[id number]]  (Normal) Collected: 11/06/24 1224    Lab Status: Final result Specimen: Swab from Nares Updated: 11/06/24 1426     MRSA PCR No MRSA Detected    Narrative:      The negative predictive value of this diagnostic test is high and should only be used to consider de-escalating anti-MRSA therapy. A positive result may indicate colonization with MRSA and must be correlated clinically.    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    Surgery, elective    Hypertension    GERD (gastroesophageal reflux disease)    Depression    COPD (chronic obstructive pulmonary disease)    Severe malnutrition  1-mechanical changes associated with operative procedure involving placement of humerus intramedullary implant with attempt to secure and create endosteal seal and hence changes in the CT scan of the right upper extremity causing ulcers enlargement of the humerus and triceps with  2-reactive leukocytosis due to ongoing stress involving the right upper extremity  3-possible evolving cellulitis with erythema and discomfort is for distal from the incision with no obvious evidence of any surgical drainage or erythema around the proximal surgical site  4-increased swelling of the right upper  extremity due to above as well as lack of mobility and use of the right upper extremity along with dependency  5-metastatic lung cancer  6-other diagnoses per primary team.     Recommendations/Discussions:  See my discussion and recommendations on 11/6/2024.  Since her MRSA screen is negative would recommend stopping vancomycin  Change ceftriaxone to cefazolin  Follow closely for further orthopedic recommendations.  Nancy Soria MD  11/7/2024  06:31 EST    Parts of this note may be an electronic transcription/translation of spoken language to printed text using the Dragon dictation system.

## 2024-11-07 NOTE — PROGRESS NOTES
"[facility] Clinical Pharmacy Services: Vancomycin Monitoring Note    Madelyn Madrid is a 84 y.o. female who is on day 2/14 of pharmacy to dose vancomycin for R arm postoperative cellulitis.     Previous Vancomycin Dose: 750 mg IV every 12 hours    Updated Cultures and Sensitivities:   BCx-NG at 24 hrs  11/6: MRSA PCR-negative    Vitals/Labs  Ht: 157.5 cm (62.01\"); Wt: 49 kg (108 lb 0.4 oz)   Temp Readings from Last 1 Encounters:   11/07/24 97.7 °F (36.5 °C) (Oral)     Estimated Creatinine Clearance: 78 mL/min (A) (by C-G formula based on SCr of 0.43 mg/dL (L)).     Results from last 7 days   Lab Units 11/07/24  0348 11/06/24  0325 11/05/24  0319   CREATININE mg/dL 0.43* 0.47* 0.54*   WBC 10*3/mm3 17.68* 15.24* 13.07*     Assessment/Plan    Current Vancomycin Dose: 750 mg IV every 12 hours; provides a predicted  mg/L.hr   Next Level Date and Time: Vanc Trough is scheduled for tomorrow at noon.  We will continue to monitor patient changes and renal function until vancomycin is discontinued or patient is discharged-SCr is stable at 0.43. Renal function panel has been ordered daily to trend renal function while the patient is on vancomycin.     Thank you for involving pharmacy in this patient's care. Please contact pharmacy with any questions or concerns.       Carmen Gonzalez, Pharm.D., [facility]   Clinical Pharmacist  Phone Extension #[phone]  "

## 2024-11-07 NOTE — THERAPY TREATMENT NOTE
Patient Name: Madelyn Madrid  : 1940    MRN: [id number]                              Today's Date: 2024       Admit Date: 10/31/2024    Visit Dx:     ICD-10-CM ICD-9-CM   1. 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    Surgery, elective    Hypertension    GERD (gastroesophageal reflux disease)    Depression    COPD (chronic obstructive pulmonary disease)    Severe malnutrition     Past Medical History:   Diagnosis Date    Arthritis     COPD (chronic obstructive pulmonary disease) 10/2/24    CT scan    Depression     Emphysema of lung     CT scan    Endometrial cancer     GERD (gastroesophageal reflux disease)     Goiter     [place] (hard of hearing)     Hx of radiation therapy     GOES 5 TIMES A WEEK    Hypertension     Incontinence of urine     Lung mass     Pancreatitis      Past Surgical History:   Procedure Laterality Date    BRONCHOSCOPY WITH ION ROBOTIC ASSIST N/A 10/23/2024    Procedure: ROBOTIC BRONCHOSCOPY WITH FINE NEEDLE ASPIRATION AND CRYO BIOPSY, BRONCHOSCOPY WITH BRONCHOALVEOLAR LAVAGE;  Surgeon: Deann Iqbal MD;  Location: [facility] ENDOSCOPY;  Service: Robotics - Pulmonary;  Laterality: N/A;    COLONOSCOPY      HYSTERECTOMY      LAPAROSCOPY HYSTEROSCOPY ENDOMETRIAL ABLATION        General Information       Row Name 24 1436          OT Time and Intention    Subjective Information no complaints  -RB     Document Type therapy note (daily note)  -RB     Mode of Treatment co-treatment;physical therapy;occupational therapy  -RB     Patient Effort good  -RB       Row Name 24 1436          General Information    Patient Profile Reviewed yes  -RB       Row Name 24 1436           Cognition    Orientation Status (Cognition) oriented to;place;person;verbal cues/prompts needed for orientation;situation  -RB               User Key  (r) = Recorded By, (t) = Taken By, (c) = Cosigned By      Initials Name Provider Type    RB Matilde Nunn OT Occupational Therapist                     Mobility/ADL's       Row Name 11/07/24 1435          Bed Mobility    Bed Mobility supine-sit  -RB     Supine-Sit [place] (Bed Mobility) minimum assist (75% patient effort);2 person assist;verbal cues  -RB     Bed Mobility, Safety Issues decreased use of arms for pushing/pulling  -RB     Assistive Device (Bed Mobility) bed rails  -RB       Row Name 11/07/24 1435          Transfers    Transfers sit-stand transfer;stand-sit transfer  -RB       Row Name 11/07/24 1435          Sit-Stand Transfer    Sit-Stand [place] (Transfers) minimum assist (75% patient effort);2 person assist;1 person assist;verbal cues  -RB     Comment, (Sit-Stand Transfer) hha + gait belt  -RB       Row Name 11/07/24 1435          Stand-Sit Transfer    Stand-Sit [place] (Transfers) minimum assist (75% patient effort);2 person assist;verbal cues;1 person assist  -RB     Comment, (Stand-Sit Transfer) hha + gait belt  -RB       Row Name 11/07/24 1435          Functional Mobility    Functional Mobility- Ind. Level minimum assist (75% patient effort);1 person + 1 person to manage equipment;verbal cues required  -RB     Functional Mobility- Safety Issues balance decreased during turns;sequencing ability decreased;step length decreased  -RB     Functional Mobility- Comment hha + gait belt  -RB       Row Name 11/07/24 1435          Activities of Daily Living    BADL Assessment/Intervention lower body dressing  -RB       Row Name 11/07/24 1435          Lower Body Dressing Assessment/Training    [place] Level (Lower Body Dressing) lower body dressing skills;dependent (less than 25% patient effort)  -RB     Position (Lower Body Dressing)  supine  -RB               User Key  (r) = Recorded By, (t) = Taken By, (c) = Cosigned By      Initials Name Provider Type    Matilde Snider OT Occupational Therapist                   Obj/Interventions       Row Name 11/07/24 1434          Elbow/Forearm (Therapeutic Exercise)    Elbow/Forearm AAROM (Therapeutic Exercise) right;flexion;extension;pronation;supination;sitting;10 repetitions  -RB       Row Name 11/07/24 1434          Wrist (Therapeutic Exercise)    Wrist (Therapeutic Exercise) AROM (active range of motion)  -RB     Wrist AROM (Therapeutic Exercise) right;flexion;extension;10 repetitions  -RB       Row Name 11/07/24 1434          Hand (Therapeutic Exercise)    Hand (Therapeutic Exercise) AROM (active range of motion)  -RB     Hand AROM/AAROM (Therapeutic Exercise) right;AROM (active range of motion);finger flexion;finger extension;finger aBduction;finger aDduction;10 repetitions  -RB       Row Name 11/07/24 1434          Motor Skills    Therapeutic Exercise elbow/forearm;wrist;hand  -RB       Row Name 11/07/24 1434          Balance    Comment, Balance Min A x1-2 for standing balance  -RB               User Key  (r) = Recorded By, (t) = Taken By, (c) = Cosigned By      Initials Name Provider Type    Matilde Snider OT Occupational Therapist                   Goals/Plan    No documentation.                  Clinical Impression       Row Name 11/07/24 1432          Pain Assessment    Pretreatment Pain Rating 2/10  -RB     Posttreatment Pain Rating 8/10  -RB     Pain Location shoulder  -RB     Pain Side/Orientation right  -RB     Pain Management Interventions exercise or physical activity utilized;positioning techniques utilized  -RB     Response to Pain Interventions intervention effective per patient report  -RB       Row Name 11/07/24 1432          Plan of Care Review    Plan of Care Reviewed With patient;family  -RB     Progress improving  -RB     Outcome Evaluation The pt participated in  OT/PT this afternoon. She reported that her RUE felt better than the day prior. She completed bed mobility with Min A. Min A x1-2 to stand and mobilize into the hallway a brief distance. She politely declined ADL's and sat in her recliner chair. She participated in BUE/BLE exercises as tolerated. Encouraged her to work on R elbow extension as the pt is becoming very stiff and increased pain is occuring. SNF recommended.  -RB       Row Name 11/07/24 1432          Therapy Assessment/Plan (OT)    Rehab Potential (OT) good  -RB     Criteria for Skilled Therapeutic Interventions Met (OT) yes;skilled treatment is necessary  -RB       Row Name 11/07/24 1432          Therapy Plan Review/Discharge Plan (OT)    Anticipated Discharge Disposition (OT) skilled nursing facility  -RB               User Key  (r) = Recorded By, (t) = Taken By, (c) = Cosigned By      Initials Name Provider Type    RB Matilde Nunn OT Occupational Therapist                   Outcome Measures       Row Name 11/07/24 0922          How much help from another person do you currently need...    Turning from your back to your side while in flat bed without using bedrails? 3  -OD     Moving from lying on back to sitting on the side of a flat bed without bedrails? 3  -OD     Moving to and from a bed to a chair (including a wheelchair)? 2  -OD     Standing up from a chair using your arms (e.g., wheelchair, bedside chair)? 2  -OD     Climbing 3-5 steps with a railing? 2  -OD     To walk in hospital room? 2  -OD     AM-PAC 6 Clicks Score (PT) 14  -OD     Highest Level of Mobility Goal 4 --> Transfer to chair/commode  -OD               User Key  (r) = Recorded By, (t) = Taken By, (c) = Cosigned By      Initials Name Provider Type    Delfina Batista RN Registered Nurse                    Occupational Therapy Education       Title: PT OT SLP Therapies (Done)       Topic: Occupational Therapy (Done)       Point: ADL training (Done)       Description:    Instruct learner(s) on proper safety adaptation and remediation techniques during self care or transfers.   Instruct in proper use of assistive devices.                  Learning Progress Summary            Patient Acceptance, E,TB, VU by  at 11/3/2024 1218    Comment: Instructed in role of OT, discharge planning, home safety, fall prevention, NWB restrictions, caregiver education                      Point: Home exercise program (Done)       Description:   Instruct learner(s) on appropriate technique for monitoring, assisting and/or progressing therapeutic exercises/activities.                  Learning Progress Summary            Patient Acceptance, E,TB, VU by  at 11/3/2024 1218    Comment: Instructed in role of OT, discharge planning, home safety, fall prevention, NWB restrictions, caregiver education                      Point: Precautions (Done)       Description:   Instruct learner(s) on prescribed precautions during self-care and functional transfers.                  Learning Progress Summary            Patient Acceptance, E,TB, VU by  at 11/3/2024 1218    Comment: Instructed in role of OT, discharge planning, home safety, fall prevention, NWB restrictions, caregiver education                      Point: Body mechanics (Done)       Description:   Instruct learner(s) on proper positioning and spine alignment during self-care, functional mobility activities and/or exercises.                  Learning Progress Summary            Patient Acceptance, E,TB, VU by  at 11/3/2024 1218    Comment: Instructed in role of OT, discharge planning, home safety, fall prevention, NWB restrictions, caregiver education                                      User Key       Initials Effective Dates Name Provider Type Discipline     08/31/23 -  Evelyn Moreno OT Occupational Therapist OT                  OT Recommendation and Plan  Therapy Frequency (OT): 5 times/wk  Plan of Care Review  Plan of Care Reviewed With:  patient, family  Progress: improving  Outcome Evaluation: The pt participated in OT/PT this afternoon. She reported that her RUE felt better than the day prior. She completed bed mobility with Min A. Min A x1-2 to stand and mobilize into the hallway a brief distance. She politely declined ADL's and sat in her recliner chair. She participated in BUE/BLE exercises as tolerated. Encouraged her to work on R elbow extension as the pt is becoming very stiff and increased pain is occuring. SNF recommended.     Time Calculation:         Time Calculation- OT       Row Name 11/07/24 1431             Time Calculation- OT    OT Start Time 1415  -RB      OT Stop Time 1429  -RB      OT Time Calculation (min) 14 min  -RB      OT Received On 11/07/24  -RB      OT - Next Appointment 11/08/24  -RB         Timed Charges    31150 - OT Therapeutic Exercise Minutes 14  -RB         Total Minutes    Timed Charges Total Minutes 14  -RB       Total Minutes 14  -RB                User Key  (r) = Recorded By, (t) = Taken By, (c) = Cosigned By      Initials Name Provider Type    Matilde Snider OT Occupational Therapist                  Therapy Charges for Today       Code Description Service Date Service Provider Modifiers Qty    [id number]  OT THER PROC EA 15 MIN 11/7/2024 Matilde Nunn OT GO 1                 Matilde Nunn OT  11/7/2024

## 2024-11-07 NOTE — THERAPY TREATMENT NOTE
Patient Name: Madelyn Madrid  : 1940    MRN: [id number]                              Today's Date: 2024       Admit Date: 10/31/2024    Visit Dx:     ICD-10-CM ICD-9-CM   1. 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    Surgery, elective    Hypertension    GERD (gastroesophageal reflux disease)    Depression    COPD (chronic obstructive pulmonary disease)    Severe malnutrition     Past Medical History:   Diagnosis Date    Arthritis     COPD (chronic obstructive pulmonary disease) 10/2/24    CT scan    Depression     Emphysema of lung     CT scan    Endometrial cancer     GERD (gastroesophageal reflux disease)     Goiter     [place]hard of hearing)     Hx of radiation therapy     GOES 5 TIMES A WEEK    Hypertension     Incontinence of urine     Lung mass     Pancreatitis      Past Surgical History:   Procedure Laterality Date    BRONCHOSCOPY WITH ION ROBOTIC ASSIST N/A 10/23/2024    Procedure: ROBOTIC BRONCHOSCOPY WITH FINE NEEDLE ASPIRATION AND CRYO BIOPSY, BRONCHOSCOPY WITH BRONCHOALVEOLAR LAVAGE;  Surgeon: Deann Iqbal MD;  Location: [facility] ENDOSCOPY;  Service: Robotics - Pulmonary;  Laterality: N/A;    COLONOSCOPY      HYSTERECTOMY      LAPAROSCOPY HYSTEROSCOPY ENDOMETRIAL ABLATION        General Information       Row Name 24 1449          Physical Therapy Time and Intention    Document Type therapy note (daily note)  -MS     Mode of Treatment occupational therapy;physical therapy;co-treatment  Activity limitation due to fatigue/weakness; Working on functional balance and strengthening while performing ADL's  -MS       Row Name 24 1449          General Information    Patient Profile  Reviewed yes  -MS     Existing Precautions/Restrictions fall   Exit alarm;  NWB RUE (In sling)  -MS     Barriers to Rehab none identified  -MS       Row Name 11/07/24 1449          Cognition    Orientation Status (Cognition) oriented x 3  -MS       Row Name 11/07/24 1449          Safety Issues/Impairments Affecting Functional Mobility    Comment, Safety Issues/Impairments (Mobility) Gait belt used for safety.  -MS               User Key  (r) = Recorded By, (t) = Taken By, (c) = Cosigned By      Initials Name Provider Type    Dwain Salazar, PT Physical Therapist                   Mobility       Row Name 11/07/24 1450          Bed Mobility    Supine-Sit [place] (Bed Mobility) minimum assist (75% patient effort)  -MS       Row Name 11/07/24 1450          Sit-Stand Transfer    Sit-Stand [place] (Transfers) minimum assist (75% patient effort)  -MS     Assistive Device (Sit-Stand Transfers) --  HHA x 1 (LUE)  -MS       Row Name 11/07/24 1450          Gait/Stairs (Locomotion)    [place] Level (Gait) minimum assist (75% patient effort);2 person assist  -MS     Assistive Device (Gait) --  HHA x 1 (LUE)  -MS     Deviations/Abnormal Patterns (Gait) matty decreased  -MS     Comment, (Gait/Stairs) Mild unsteadiness throughout upright mobility.  -MS               User Key  (r) = Recorded By, (t) = Taken By, (c) = Cosigned By      Initials Name Provider Type    Dwain Salazar PT Physical Therapist                   Obj/Interventions       Row Name 11/07/24 1451          Motor Skills    Therapeutic Exercise --  BLE ther. ex. program x 10 reps completed (Hip Flexion, LAQ's)  -MS               User Key  (r) = Recorded By, (t) = Taken By, (c) = Cosigned By      Initials Name Provider Type    Dwain Salazar, PT Physical Therapist                   Goals/Plan    No documentation.                  Clinical Impression       Row Name 11/07/24 1451          Pain    Pretreatment Pain Rating 8/10  -MS      Posttreatment Pain Rating 5/10  -MS     Pain Management Interventions nursing notified;positioning techniques utilized;premedicated for activity  -MS       Row Name 11/07/24 1451          Positioning and Restraints    Pre-Treatment Position in bed  -MS     Post Treatment Position chair  -MS     In Chair notified nsg;reclined;sitting;call light within reach;encouraged to call for assist;exit alarm on;with family/caregiver  -MS               User Key  (r) = Recorded By, (t) = Taken By, (c) = Cosigned By      Initials Name Provider Type    MS Dwain Hendricks, PT Physical Therapist                   Outcome Measures       Row Name 11/07/24 1451 11/07/24 0922       How much help from another person do you currently need...    Turning from your back to your side while in flat bed without using bedrails? 3  -MS 3  -OD    Moving from lying on back to sitting on the side of a flat bed without bedrails? 3  -MS 3  -OD    Moving to and from a bed to a chair (including a wheelchair)? 2  -MS 2  -OD    Standing up from a chair using your arms (e.g., wheelchair, bedside chair)? 3  -MS 2  -OD    Climbing 3-5 steps with a railing? 2  -MS 2  -OD    To walk in hospital room? 2  -MS 2  -OD    AM-PAC 6 Clicks Score (PT) 15  -MS 14  -OD    Highest Level of Mobility Goal 4 --> Transfer to chair/commode  -MS 4 --> Transfer to chair/commode  -OD      Row Name 11/07/24 1451          Functional Assessment    Outcome Measure Options AM-PAC 6 Clicks Basic Mobility (PT)  -MS               User Key  (r) = Recorded By, (t) = Taken By, (c) = Cosigned By      Initials Name Provider Type    Dwain Salazar, PT Physical Therapist    OD Delfina Rodriguez RN Registered Nurse                                 Physical Therapy Education       Title: PT OT SLP Therapies (Done)       Topic: Physical Therapy (Done)       Point: Mobility training (Done)       Learning Progress Summary            Patient Acceptance, E,D, VU,NR by MS at 11/7/2024 1426     Acceptance, E,D, VU,NR by MS at 11/5/2024 1019    Acceptance, E,D, VU,NR by MS at 11/4/2024 1005    Acceptance, E, VU,NR by CN at 11/2/2024 1623                      Point: Home exercise program (Done)       Learning Progress Summary            Patient Acceptance, E,D, VU,NR by MS at 11/7/2024 1452    Acceptance, E,TB,D, VU,NR by CB at 11/6/2024 1036    Acceptance, E,D, VU,NR by MS at 11/4/2024 1005    Acceptance, E, VU,NR by CN at 11/2/2024 1623                      Point: Body mechanics (Done)       Learning Progress Summary            Patient Acceptance, E,D, VU,NR by MS at 11/7/2024 1452    Acceptance, E,D, VU,NR by MS at 11/5/2024 1019    Acceptance, E,D, VU,NR by MS at 11/4/2024 1005    Acceptance, E, VU,NR by CN at 11/2/2024 1623                      Point: Precautions (Done)       Learning Progress Summary            Patient Acceptance, E,D, VU,NR by MS at 11/7/2024 1452    Acceptance, E,D, VU,NR by MS at 11/5/2024 1019    Acceptance, E,D, VU,NR by MS at 11/4/2024 1005    Acceptance, E, VU,NR by CN at 11/2/2024 1623                                      User Key       Initials Effective Dates Name Provider Type Discipline    MS 06/16/21 -  Dwain Hendricks, PT Physical Therapist PT    CN 06/16/21 -  Meg Alan, PT Physical Therapist PT    CB 10/22/21 -  Kylie Corcoran, PT Physical Therapist PT                  PT Recommendation and Plan     Outcome Evaluation: Upon entering room, pt. supine in bed, awake/alert, and agreeable to work with P.T. this date despite c/o RUE pain.  This PM, pt. able to ambulate 35 feet, Min. assist x 2, with HHA x 1 (LUE).  Pt. requires Min. assist x 1 for bed mobility and Min. assist x 1 for sit <-> stand transfers.  BLE ther. ex. program x 10 reps completed for general strengthening.  Mild unsteadiness throughout upright mobility.  Will continue to progress functional mobility as tolerated.     Time Calculation:         PT Charges       Row Name 11/07/24 5027              Time Calculation    Start Time 1414  -MS      Stop Time 1430  -MS      Time Calculation (min) 16 min  -MS      PT Received On 11/07/24  -MS      PT - Next Appointment 11/08/24  -MS         Time Calculation- PT    Total Timed Code Minutes- PT 15 minute(s)  -MS                User Key  (r) = Recorded By, (t) = Taken By, (c) = Cosigned By      Initials Name Provider Type    Dwain Salazar, PT Physical Therapist                  Therapy Charges for Today       Code Description Service Date Service Provider Modifiers Qty    [id number]  PT THERAPEUTIC ACT EA 15 MIN 11/7/2024 Dwain Hendricks, PT GP 1            PT G-Codes  Outcome Measure Options: AM-PAC 6 Clicks Basic Mobility (PT)  AM-PAC 6 Clicks Score (PT): 15  AM-PAC 6 Clicks Score (OT): 7       Dwain Hendricks PT  11/7/2024

## 2024-11-07 NOTE — PROGRESS NOTES
"                                                                  Orthopaedic/Musculoskeletal Oncologic  Progress Note      Patient Identification:  Name: Madelyn Madrid  Age: 84 y.o.  Sex: female  :  1940  MRN: [id number]                    Subjective:   Events noted.  Was asked to reevaluate the patient secondary to fever, left upper extremity erythema swelling and pain.    Patient currently states that swelling is improving.  She is feeling much better.  Continues to have a disproportionate amount of pain in her operative extremity.    Per nursing afebrile today.    Current Meds:   Scheduled Meds:acetaminophen, 1,000 mg, Oral, Q8H  amLODIPine, 10 mg, Oral, Daily  cefTRIAXone, 2,000 mg, Intravenous, Q24H  [Held by provider] enoxaparin, 40 mg, Subcutaneous, Nightly  [Held by provider] escitalopram, 20 mg, Oral, QAM  pantoprazole, 40 mg, Oral, Q AM  polyethylene glycol, 17 g, Oral, BID AC  senna-docusate sodium, 2 tablet, Oral, BID  traMADol, 50 mg, Oral, Once  vancomycin, 750 mg, Intravenous, Q12H      Continuous Infusions:Pharmacy to dose vancomycin,       PRN Meds:.  [DISCONTINUED] senna-docusate sodium **AND** [DISCONTINUED] polyethylene glycol **AND** bisacodyl **AND** bisacodyl    hydrOXYzine    ipratropium-albuterol    ondansetron ODT **OR** ondansetron    Pharmacy to dose vancomycin    traMADol    Objective:  tMax 24 hrs: Temp (24hrs), Av.2 °F (36.8 °C), Min:97.7 °F (36.5 °C), Max:98.6 °F (37 °C)    Vitals Ranges:   Temp:  [97.7 °F (36.5 °C)-98.6 °F (37 °C)] 98.1 °F (36.7 °C)  Heart Rate:  [] 107  Resp:  [16] 16  BP: (126-162)/(65-71) 126/67  Intake and Output Last 3 Shifts:   I/O last 3 completed shifts:  In: 1460 [P.O.:1460]  Out: 400 [Urine:400]    Physical Exam:  /67 (BP Location: Left arm, Patient Position: Lying)   Pulse 107   Temp 98.1 °F (36.7 °C) (Oral)   Resp 16   Ht 157.5 cm (62.01\")   Wt 49 kg (108 lb 0.4 oz)   LMP  (LMP Unknown)   SpO2 94%   BMI 19.75 kg/m² "     General Appearance:    Alert, cooperative, no distress, appears stated age   HEENT:    Normocephalic, atraumatic. Sclerae anicteric.  Mucous         membranes moist.   Cardio:   Regular rate and rhythm.   Lungs:     Breathing unlabored on room air   Focused MSK: Left upper extremity is in a sling significant swelling is noted about the whole arm with erythema.  There is warmth to the area as well.  Patient has tenderness with palpation along the lateral and posterior aspect of the arm.  She has a 2+ radial pulse.  Sensation is intact to light touch in the radial, median, ulnar nerve distributions.  AIN and PIN are intact.           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 female postoperative day 7 status post intramedullary fixation of right humerus pathologic fracture.  Patient has had approximately 48 hours of swelling redness and worsening pain with clinical evidence of disproportionate amount of swelling and pain than typical for the procedure.  Review of the imaging does demonstrate swelling and inflammation in the region of the triceps.  There is no obvious abscess.  There are areas areas in the triceps that are concerning for possible areas of fluid.      Medical decision-making:  Given the imaging findings and the lack of an involvement of the region in the operative field I can only assume that there was possibly a perforation in the balloon and that there was extravasation of the monomer into the muscular tissues.  Given that the curing mechanism was not employed in the triceps area I feel that the monomer is likely still in its liquid form.  The monomer to my  knowledge is an inert substance however I feel that the inflammation and swelling are likely secondary to this as opposed to infection and abscess.  I recommend elevation of the operative extremity in addition to ice and range of motion within the patient's tolerance.  She is to remain nonweightbearing  I will call the device  and and discuss this situation with them to see if there is anything that needs to be employed and if I&D is necessary to remove potential monomer from the muscular tissues.  If no contraindication I do recommend anti-inflammatory medications to help with her pain and inflammation.    Elmo Altman MD  11/7/2024

## 2024-11-07 NOTE — PROGRESS NOTES
[facility] Progress Note      Patient Name: Madelyn Madrid  : 1940  MRN: [id number]  Primary Care Physician:  Therese Martinez MD  Date of admission: 10/31/2024    Subjective     Interval History:   Follow-up hyponatremia.  Dr. Soria's evaluation noted.  Blood pressure stable.  On room air.  Urine output not measured.  Feels tired after working with physical therapy this morning.  Has been concerned about the T5 and T9 vertebral body pathologic fractures noted on CT. review of the records demonstrates that these were noted on the PET scan 10/22/2024.  Review of Systems:   As noted above    Objective     Vitals:   Temp:  [97.7 °F (36.5 °C)-99.8 °F (37.7 °C)] 98.1 °F (36.7 °C)  Heart Rate:  [] 107  Resp:  [16] 16  BP: (126-162)/(65-73) 126/67    Intake/Output Summary (Last 24 hours) at 2024 1152  Last data filed at 2024 1132  Gross per 24 hour   Intake 800 ml   Output --   Net 800 ml       Physical Exam:    General: Chronically ill.  Fatigued.  Falls asleep quickly.  Hard of hearing.  Neck no JVD  HEENT oral mucosa  dry.  Nonicteric sclera  Lungs clear to auscultation bilaterally.  Heart: Regular rate and rhythm no S3 or rub.  Abdomen:+ Bowel sounds,  soft nontender  Extremities no edema.  Right arm in sling.  Neuro: Awake and alert.  Speech normal.      Scheduled Meds:     acetaminophen, 1,000 mg, Oral, Q8H  amLODIPine, 10 mg, Oral, Daily  cefTRIAXone, 2,000 mg, Intravenous, Q24H  [Held by provider] enoxaparin, 40 mg, Subcutaneous, Nightly  [Held by provider] escitalopram, 20 mg, Oral, QAM  pantoprazole, 40 mg, Oral, Q AM  polyethylene glycol, 17 g, Oral, BID AC  senna-docusate sodium, 2 tablet, Oral, BID  traMADol, 50 mg, Oral, Once  vancomycin, 750 mg, Intravenous, Q12H      IV Meds:   Pharmacy to dose vancomycin,         Results Reviewed:   I have personally reviewed the results from the time of this admission to 2024 11:52 EST     Results from last 7  days   Lab Units 11/07/24 0348 11/06/24 0325 11/05/24  1614 11/05/24 0319 11/03/24 1154 11/03/24 0353 11/01/24 0835 10/31/24  2017   SODIUM mmol/L 134* 132* 133* 136   < > 130*   < > 135*   POTASSIUM mmol/L 4.0 4.3  --  5.0   < > 4.5   < > 3.3*   CHLORIDE mmol/L 99 98  --  101   < > 98   < > 95*   CO2 mmol/L 27.5 26.4  --  24.4   < > 20.2*   < > 23.6   BUN mg/dL 11 15  --  23   < > 33*   < > 10   CREATININE mg/dL 0.43* 0.47*  --  0.54*   < > 0.84   < > 0.78   CALCIUM mg/dL 7.7* 7.8*  --  7.9*   < > 8.7   < > 9.0   BILIRUBIN mg/dL  --  0.2  --   --   --  0.4  --  0.4   ALK PHOS U/L  --  131*  --   --   --  78  --  102   ALT (SGPT) U/L  --  6  --   --   --  7  --  11   AST (SGOT) U/L  --  18  --   --   --  27  --  47*   GLUCOSE mg/dL 101* 102*  --  110*   < > 95   < > 146*    < > = values in this interval not displayed.       Estimated Creatinine Clearance: 78 mL/min (A) (by C-G formula based on SCr of 0.43 mg/dL (L)).    Results from last 7 days   Lab Units 11/07/24 0348 11/06/24 0325 11/05/24 0319   MAGNESIUM mg/dL 1.5* 1.6 1.8   PHOSPHORUS mg/dL 3.4 2.1* 2.9             Results from last 7 days   Lab Units 11/07/24 0348 11/06/24 0325 11/05/24 1157 11/05/24 0319 11/04/24 0324 11/03/24 0353   WBC 10*3/mm3 17.68* 15.24*  --  13.07* 9.91 11.09*   HEMOGLOBIN g/dL 9.5* 9.7* 11.0* 9.8* 10.9* 11.8*   PLATELETS 10*3/mm3 324 315  --  327 325 304             Assessment / Plan     ASSESSMENT:  Hyponatremia.  TSH and cortisol normal.  Sodium 134.  Urine studies look more consistent with a prerenal picture.  Discontinued fluid restriction .  Sodium above 130 in safe range.  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  Right humerus fracture.  Status post right humeral intramedullary nail 11/4/2024.   Hypertension controlled.  4.  Chronic heart failure preserved ejection fraction.  Grade 1 dysfunction on echo 9/24.  Compensated.  5.  Fever.  Blood cultures negative so far.  Dr. Soria evaluated.   Right upper extremity postop surgical site infection.  Now on Kefzol.  PLAN:  Monitor chemistries.  Discussed with patient and family at bedside.  Thank you for involving us in the care of Madelyn ANUP VillalbaJulius.  Please feel free to call with any questions.    Christy Cruz MD  11/07/24  11:52 [facility]    [facility]  [phone]    Please note that portions of this note were completed with a voice recognition program.

## 2024-11-08 LAB
ALBUMIN SERPL-MCNC: 2 G/DL (ref 3.5–5.2)
ANION GAP SERPL CALCULATED.3IONS-SCNC: 10 MMOL/L (ref 5–15)
BUN SERPL-MCNC: 9 MG/DL (ref 8–23)
BUN/CREAT SERPL: 23.7 (ref 7–25)
CALCIUM SPEC-SCNC: 7.3 MG/DL (ref 8.6–10.5)
CHLORIDE SERPL-SCNC: 99 MMOL/L (ref 98–107)
CO2 SERPL-SCNC: 23 MMOL/L (ref 22–29)
CREAT SERPL-MCNC: 0.38 MG/DL (ref 0.57–1)
DEPRECATED RDW RBC AUTO: 38.9 FL (ref 37–54)
EGFRCR SERPLBLD CKD-EPI 2021: 100.2 ML/MIN/1.73
ERYTHROCYTE [DISTWIDTH] IN BLOOD BY AUTOMATED COUNT: 13.5 % (ref 12.3–15.4)
GLUCOSE SERPL-MCNC: 100 MG/DL (ref 65–99)
HCT VFR BLD AUTO: 31.9 % (ref 34–46.6)
HGB BLD-MCNC: 10.1 G/DL (ref 12–15.9)
MAGNESIUM SERPL-MCNC: 2.3 MG/DL (ref 1.6–2.4)
MCH RBC QN AUTO: 25.3 PG (ref 26.6–33)
MCHC RBC AUTO-ENTMCNC: 31.7 G/DL (ref 31.5–35.7)
MCV RBC AUTO: 79.8 FL (ref 79–97)
PHOSPHATE SERPL-MCNC: 3.1 MG/DL (ref 2.5–4.5)
PLATELET # BLD AUTO: 363 10*3/MM3 (ref 140–450)
PMV BLD AUTO: 9.2 FL (ref 6–12)
POTASSIUM SERPL-SCNC: 3.9 MMOL/L (ref 3.5–5.2)
RBC # BLD AUTO: 4 10*6/MM3 (ref 3.77–5.28)
SODIUM SERPL-SCNC: 132 MMOL/L (ref 136–145)
VANCOMYCIN TROUGH SERPL-MCNC: 7.5 MCG/ML (ref 5–20)
WBC NRBC COR # BLD AUTO: 24.14 10*3/MM3 (ref 3.4–10.8)

## 2024-11-08 PROCEDURE — 25010000002 CEFTRIAXONE PER 250 MG: Performed by: STUDENT IN AN ORGANIZED HEALTH CARE EDUCATION/TRAINING PROGRAM

## 2024-11-08 PROCEDURE — 63710000001 METHYLPREDNISOLONE 4 MG TABLET THERAPY PACK 21 EACH DISP PACK: Performed by: HOSPITALIST

## 2024-11-08 PROCEDURE — 83735 ASSAY OF MAGNESIUM: CPT | Performed by: STUDENT IN AN ORGANIZED HEALTH CARE EDUCATION/TRAINING PROGRAM

## 2024-11-08 PROCEDURE — 25810000003 SODIUM CHLORIDE 0.9 % SOLUTION 250 ML FLEX CONT: Performed by: STUDENT IN AN ORGANIZED HEALTH CARE EDUCATION/TRAINING PROGRAM

## 2024-11-08 PROCEDURE — 25810000003 SODIUM CHLORIDE 0.9 % SOLUTION 250 ML FLEX CONT: Performed by: HOSPITALIST

## 2024-11-08 PROCEDURE — 25010000002 VANCOMYCIN 750 MG RECONSTITUTED SOLUTION 1 EACH VIAL: Performed by: STUDENT IN AN ORGANIZED HEALTH CARE EDUCATION/TRAINING PROGRAM

## 2024-11-08 PROCEDURE — 85027 COMPLETE CBC AUTOMATED: CPT | Performed by: STUDENT IN AN ORGANIZED HEALTH CARE EDUCATION/TRAINING PROGRAM

## 2024-11-08 PROCEDURE — 80202 ASSAY OF VANCOMYCIN: CPT | Performed by: STUDENT IN AN ORGANIZED HEALTH CARE EDUCATION/TRAINING PROGRAM

## 2024-11-08 PROCEDURE — 80069 RENAL FUNCTION PANEL: CPT | Performed by: INTERNAL MEDICINE

## 2024-11-08 PROCEDURE — 25010000002 VANCOMYCIN 1 G RECONSTITUTED SOLUTION 1 EACH VIAL: Performed by: HOSPITALIST

## 2024-11-08 RX ORDER — METHYLPREDNISOLONE 4 MG/1
4 TABLET ORAL
Status: DISCONTINUED | OUTPATIENT
Start: 2024-11-11 | End: 2024-11-11 | Stop reason: HOSPADM

## 2024-11-08 RX ORDER — METHYLPREDNISOLONE 4 MG/1
8 TABLET ORAL
Status: COMPLETED | OUTPATIENT
Start: 2024-11-08 | End: 2024-11-08

## 2024-11-08 RX ORDER — METHYLPREDNISOLONE 4 MG/1
8 TABLET ORAL
Status: COMPLETED | OUTPATIENT
Start: 2024-11-09 | End: 2024-11-09

## 2024-11-08 RX ORDER — METHYLPREDNISOLONE 4 MG/1
4 TABLET ORAL
Status: DISCONTINUED | OUTPATIENT
Start: 2024-11-13 | End: 2024-11-11 | Stop reason: HOSPADM

## 2024-11-08 RX ORDER — NYSTATIN 100000 [USP'U]/G
POWDER TOPICAL EVERY 12 HOURS SCHEDULED
Status: DISCONTINUED | OUTPATIENT
Start: 2024-11-08 | End: 2024-11-11 | Stop reason: HOSPADM

## 2024-11-08 RX ORDER — METHYLPREDNISOLONE 4 MG/1
4 TABLET ORAL
Status: DISCONTINUED | OUTPATIENT
Start: 2024-11-12 | End: 2024-11-11 | Stop reason: HOSPADM

## 2024-11-08 RX ORDER — METHYLPREDNISOLONE 4 MG/1
4 TABLET ORAL
Status: COMPLETED | OUTPATIENT
Start: 2024-11-08 | End: 2024-11-08

## 2024-11-08 RX ORDER — METHYLPREDNISOLONE 4 MG/1
4 TABLET ORAL
Status: COMPLETED | OUTPATIENT
Start: 2024-11-09 | End: 2024-11-09

## 2024-11-08 RX ORDER — METHYLPREDNISOLONE 4 MG/1
4 TABLET ORAL
Status: COMPLETED | OUTPATIENT
Start: 2024-11-10 | End: 2024-11-10

## 2024-11-08 RX ADMIN — VANCOMYCIN HYDROCHLORIDE 750 MG: 750 INJECTION, POWDER, LYOPHILIZED, FOR SOLUTION INTRAVENOUS at 13:48

## 2024-11-08 RX ADMIN — AMLODIPINE BESYLATE 10 MG: 10 TABLET ORAL at 08:46

## 2024-11-08 RX ADMIN — PANTOPRAZOLE SODIUM 40 MG: 40 TABLET, DELAYED RELEASE ORAL at 06:04

## 2024-11-08 RX ADMIN — ACETAMINOPHEN 1000 MG: 500 TABLET ORAL at 21:31

## 2024-11-08 RX ADMIN — METHYLPREDNISOLONE 4 MG: 4 TABLET ORAL at 18:47

## 2024-11-08 RX ADMIN — METHYLPREDNISOLONE 8 MG: 4 TABLET ORAL at 21:31

## 2024-11-08 RX ADMIN — ACETAMINOPHEN 1000 MG: 500 TABLET ORAL at 13:48

## 2024-11-08 RX ADMIN — ACETAMINOPHEN 1000 MG: 500 TABLET ORAL at 04:05

## 2024-11-08 RX ADMIN — VANCOMYCIN HYDROCHLORIDE 750 MG: 750 INJECTION, POWDER, LYOPHILIZED, FOR SOLUTION INTRAVENOUS at 02:37

## 2024-11-08 RX ADMIN — CEFTRIAXONE 2000 MG: 2 INJECTION, POWDER, FOR SOLUTION INTRAMUSCULAR; INTRAVENOUS at 16:32

## 2024-11-08 RX ADMIN — TRAMADOL HYDROCHLORIDE 50 MG: 50 TABLET, COATED ORAL at 02:47

## 2024-11-08 RX ADMIN — TRAMADOL HYDROCHLORIDE 50 MG: 50 TABLET, COATED ORAL at 08:46

## 2024-11-08 RX ADMIN — SENNOSIDES AND DOCUSATE SODIUM 2 TABLET: 50; 8.6 TABLET ORAL at 08:46

## 2024-11-08 RX ADMIN — NYSTATIN: 100000 POWDER TOPICAL at 21:31

## 2024-11-08 RX ADMIN — METHYLPREDNISOLONE 8 MG: 4 TABLET ORAL at 11:42

## 2024-11-08 RX ADMIN — VANCOMYCIN HYDROCHLORIDE 1000 MG: 1 INJECTION, POWDER, LYOPHILIZED, FOR SOLUTION INTRAVENOUS at 21:43

## 2024-11-08 RX ADMIN — TRAMADOL HYDROCHLORIDE 50 MG: 50 TABLET, COATED ORAL at 21:31

## 2024-11-08 RX ADMIN — TRAMADOL HYDROCHLORIDE 50 MG: 50 TABLET, COATED ORAL at 14:55

## 2024-11-08 RX ADMIN — POLYETHYLENE GLYCOL 3350 17 G: 17 POWDER, FOR SOLUTION ORAL at 16:32

## 2024-11-08 RX ADMIN — METHYLPREDNISOLONE 4 MG: 4 TABLET ORAL at 13:47

## 2024-11-08 NOTE — PROGRESS NOTES
"  Infectious Diseases Progress Note    Nancy Soria MD     [facility]  Los: 5 days  Patient Identification:  Name: Madelyn Madrid  Age: 84 y.o.  Sex: female  :  1940  MRN: [id number]         Primary Care Physician: Therese Martinez MD        Subjective: Pain and discomfort in her right arm is slightly better denies any fever and chills.  Patient and family embers are concern for continued rise in white blood cell count despite being on antibiotic therapy.  Interval History: See consultation note.    Objective:    Scheduled Meds:acetaminophen, 1,000 mg, Oral, Q8H  amLODIPine, 10 mg, Oral, Daily  cefTRIAXone, 2,000 mg, Intravenous, Q24H  [Held by provider] enoxaparin, 40 mg, Subcutaneous, Nightly  [Held by provider] escitalopram, 20 mg, Oral, QAM  pantoprazole, 40 mg, Oral, Q AM  polyethylene glycol, 17 g, Oral, BID AC  senna-docusate sodium, 2 tablet, Oral, BID  traMADol, 50 mg, Oral, Once  vancomycin, 750 mg, Intravenous, Q12H      Continuous Infusions:Pharmacy to dose vancomycin,         Vital signs in last 24 hours:  Temp:  [97.5 °F (36.4 °C)-99.1 °F (37.3 °C)] 97.5 °F (36.4 °C)  Heart Rate:  [106-124] 106  Resp:  [16] 16  BP: (126-159)/(67-73) 159/73    Intake/Output:    Intake/Output Summary (Last 24 hours) at 2024 0821  Last data filed at 2024 0501  Gross per 24 hour   Intake 420 ml   Output 900 ml   Net -480 ml       Exam:  /73 (BP Location: Left arm, Patient Position: Lying)   Pulse 106   Temp 97.5 °F (36.4 °C) (Oral)   Resp 16   Ht 157.5 cm (62.01\")   Wt 49 kg (108 lb 0.4 oz)   LMP  (LMP Unknown)   SpO2 95%   BMI 19.75 kg/m²   Patient is examined using the personal protective equipment as per guidelines from infection control for this particular patient as enacted.  Hand washing was performed before and after patient interaction.  General Appearance:  Emaciated chronically ill nontoxic-appearing elderly female                          Head:    Normocephalic, " without obvious abnormality, atraumatic                           Eyes:    PERRL, conjunctivae/corneas clear, EOM's intact, both eyes                         Throat:   Lips, tongue, gums normal; oral mucosa pink and moist                           Neck:   Supple, symmetrical, trachea midline, no JVD                         Lungs:    Clear to auscultation bilaterally, respirations unlabored                 Chest Wall:    No tenderness or deformity                          Heart:  S1-S2 regular.                  Abdomen:   Soft nontender                 Extremities: Proximal humerus surgical site clean with no drainage, right arm significantly swollen with slight decrease in erythema extending all the way to her hand from mid arm. Wrist and elbow joint functional.  Able to move fingers and make a fist with her right hand.                        Pulses:   Pulses palpable in all extremities                            Skin: Erythematous changes and swelling of the right arm noted.                  Neurologic: Hard of hearing but oriented x 3       Data Review:    I reviewed the patient's new clinical results.  Results from last 7 days   Lab Units 11/08/24  0537 11/07/24  0348 11/06/24  0325 11/05/24  1157 11/05/24  0319 11/04/24  0324 11/03/24  0353 11/02/24  0806   WBC 10*3/mm3 24.14* 17.68* 15.24*  --  13.07* 9.91 11.09* 11.17*   HEMOGLOBIN g/dL 10.1* 9.5* 9.7* 11.0* 9.8* 10.9* 11.8* 12.8   PLATELETS 10*3/mm3 363 324 315  --  327 325 304 304     Results from last 7 days   Lab Units 11/08/24  0537 11/07/24  0348 11/06/24  0325 11/05/24  1614 11/05/24  0319 11/05/24  0005 11/04/24  1355 11/04/24  0324 11/03/24  1154 11/03/24  0353 11/02/24  0806   SODIUM mmol/L 132* 134* 132* 133* 136 133*  --  128*   < > 130* 133*   POTASSIUM mmol/L 3.9 4.0 4.3  --  5.0  --  3.9 5.4*  --  4.5 4.7   CHLORIDE mmol/L 99 99 98  --  101  --   --  99  --  98 103   CO2 mmol/L 23.0 27.5 26.4  --  24.4  --   --  21.8*  --  20.2* 19.0*   BUN  mg/dL 9 11 15  --  23  --   --  34*  --  33* 31*   CREATININE mg/dL 0.38* 0.43* 0.47*  --  0.54*  --   --  0.73  --  0.84 0.85   CALCIUM mg/dL 7.3* 7.7* 7.8*  --  7.9*  --   --  8.6  --  8.7 7.7*   GLUCOSE mg/dL 100* 101* 102*  --  110*  --   --  99  --  95 132*    < > = values in this interval not displayed.     Microbiology Results (last 10 days)       Procedure Component Value - Date/Time    MRSA Screen, PCR (Inpatient) - Swab, Nares [[id number]]  (Normal) Collected: 11/06/24 1224    Lab Status: Final result Specimen: Swab from Nares Updated: 11/06/24 1426     MRSA PCR No MRSA Detected    Narrative:      The negative predictive value of this diagnostic test is high and should only be used to consider de-escalating anti-MRSA therapy. A positive result may indicate colonization with MRSA and must be correlated clinically.    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    Surgery, elective    Hypertension    GERD (gastroesophageal reflux disease)    Depression    COPD (chronic obstructive pulmonary disease)    Severe malnutrition  1-mechanical changes associated with operative procedure involving placement of humerus intramedullary implant with attempt to secure and create endosteal seal and hence changes in the CT scan of the right upper extremity causing ulcers enlargement of the humerus and triceps with  2-reactive leukocytosis due to ongoing stress involving the right upper extremity  3-possible evolving cellulitis with erythema  and discomfort is for distal from the incision with no obvious evidence of any surgical drainage or erythema around the proximal surgical site  4-increased swelling of the right upper extremity due to above as well as lack of mobility and use of the right upper extremity along with dependency  5-metastatic lung cancer  6-other diagnoses per primary team.     Recommendations/Discussions:  See my discussion and recommendation 1116 11/7/2024.  Discussed with patient's family as well as with Dr. Jasso about continuation of current antibiotic therapy despite initial plans for de-escalation given the continued rise in white blood cell count and pending intervention decision by orthopedic surgery service as well as consideration for the use of steroids/anti-inflammatory agents.  Nancy Soria MD  11/8/2024  08:21 EST    Parts of this note may be an electronic transcription/translation of spoken language to printed text using the Dragon dictation system.

## 2024-11-08 NOTE — PROGRESS NOTES
[facility]     LOS: 5 days     Subjective:    CC:No chief complaint on file.    DIET:  Diet Order   Procedures    Diet: Regular/House; Fluid Consistency: Thin (IDDSI 0)     No new complaints, continues to have significant swelling right upper extremity    Objective:    Vital Signs:  Temp:  [97.5 °F (36.4 °C)-99.3 °F (37.4 °C)] 98.4 °F (36.9 °C)  Heart Rate:  [104-124] 104  Resp:  [16-18] 16  BP: (122-159)/(55-73) 135/55    SpO2:  [91 %-95 %] 93 %  on   ;   Device (Oxygen Therapy): room air  Body mass index is 19.75 kg/m².    Physical Exam  Constitutional:       General: She is not in acute distress.  Pulmonary:      Effort: Pulmonary effort is normal.      Breath sounds: Normal breath sounds.   Abdominal:      General: There is no distension.      Palpations: Abdomen is soft.      Tenderness: There is no abdominal tenderness.   Musculoskeletal:      Comments: Right upper extremity edema   Skin:     General: Skin is warm and dry.   Neurological:      General: No focal deficit present.      Mental Status: She is alert.   Psychiatric:         Mood and Affect: Mood normal.         Behavior: Behavior normal.         Results Review:    Glucose   Date Value Ref Range Status   11/08/2024 100 (H) 65 - 99 mg/dL Final   11/07/2024 101 (H) 65 - 99 mg/dL Final   11/06/2024 102 (H) 65 - 99 mg/dL Final     Results from last 7 days   Lab Units 11/08/24  0537   WBC 10*3/mm3 24.14*   HEMOGLOBIN g/dL 10.1*   HEMATOCRIT % 31.9*   PLATELETS 10*3/mm3 363     Results from last 7 days   Lab Units 11/08/24  0537 11/07/24  0348 11/06/24  0325   SODIUM mmol/L 132*   < > 132*   POTASSIUM mmol/L 3.9   < > 4.3   CHLORIDE mmol/L 99   < > 98   CO2 mmol/L 23.0   < > 26.4   BUN mg/dL 9   < > 15   CREATININE mg/dL 0.38*   < > 0.47*   CALCIUM mg/dL 7.3*   < > 7.8*   BILIRUBIN mg/dL  --   --  0.2   ALK PHOS U/L  --   --  131*   ALT (SGPT) U/L  --   --  6   AST (SGOT) U/L  --   --  18   GLUCOSE mg/dL 100*   < > 102*    < >  = values in this interval not displayed.         Results from last 7 days   Lab Units 11/08/24  0537   MAGNESIUM mg/dL 2.3         Cultures:  Blood Culture   Date Value Ref Range Status   11/05/2024 No growth at 24 hours  Preliminary   11/05/2024 No growth at 24 hours  Preliminary       I have reviewed daily medications and changes in CPOE    Scheduled meds  acetaminophen, 1,000 mg, Oral, Q8H  amLODIPine, 10 mg, Oral, Daily  cefTRIAXone, 2,000 mg, Intravenous, Q24H  [Held by provider] enoxaparin, 40 mg, Subcutaneous, Nightly  [Held by provider] escitalopram, 20 mg, Oral, QAM  methylPREDNISolone, 4 mg, Oral, After Dinner  [START ON 11/9/2024] methylPREDNISolone, 4 mg, Oral, TID Around Food  [START ON 11/10/2024] methylPREDNISolone, 4 mg, Oral, 4x Daily Taper  [START ON 11/11/2024] methylPREDNISolone, 4 mg, Oral, TID Around Food  [START ON 11/12/2024] methylPREDNISolone, 4 mg, Oral, Before Breakfast  [START ON 11/12/2024] methylPREDNISolone, 4 mg, Oral, Tonight  [START ON 11/13/2024] methylPREDNISolone, 4 mg, Oral, Before Breakfast  methylPREDNISolone, 8 mg, Oral, Tonight  [START ON 11/9/2024] methylPREDNISolone, 8 mg, Oral, Tonight  nystatin, , Topical, Q12H  pantoprazole, 40 mg, Oral, Q AM  polyethylene glycol, 17 g, Oral, BID AC  senna-docusate sodium, 2 tablet, Oral, BID  traMADol, 50 mg, Oral, Once  vancomycin, 1,000 mg, Intravenous, Q12H        Pharmacy to dose vancomycin,       PRN meds    [DISCONTINUED] senna-docusate sodium **AND** [DISCONTINUED] polyethylene glycol **AND** bisacodyl **AND** bisacodyl    hydrOXYzine    ipratropium-albuterol    ondansetron ODT **OR** ondansetron    Pharmacy to dose vancomycin    traMADol        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    Surgery, elective    Hypertension    GERD (gastroesophageal reflux disease)    Depression    COPD (chronic obstructive pulmonary disease)    Severe malnutrition        Assessment/Plan:  Humerus  intramedullary implant  -Concern for extravasation of monomer into the muscular tissue    Possible infection  -Seems less likely and more likely reaction to the monomer  -For now continue with Rocephin and vancomycin  -Monitor Pro-Khang and white blood cell count    Hyponatremia  -Stable    Tremors    Hyperkalemia  -Has resolved    Encephalopathy  -Patient appears to be at her baseline today    Anemia-hemoglobin is stable    Case was discussed with Dr. Altman and weighing risks and benefits we have started steroids.  Also discussed with Dr Soria and he is in agreement agreement with steroids if felt to be indicated by Dr. Altman    Possible discharge 2 to 3 days    SCDs DVT prophylaxis, concerned about the significant arm swelling so for now we will hold on Lovenox    Jesus Jasso MD  11/08/24  18:44 EST

## 2024-11-08 NOTE — PROGRESS NOTES
"[facility] Clinical [facility]acy Services: Vancomycin Monitoring Note    Madelyn Madrid is a 84 y.o. female who is on day 3/14 of pharmacy to dose vancomycin for R arm postoperative cellulitis.     Previous Vancomycin Dose: 750 mg IV every 12 hours    Updated Cultures and Sensitivities:   BCx-NG x 2d  11/6: MRSA PCR-negative    Lab Results   Component Value Date    VANCOTROUGH 7.50 11/08/2024       Vitals/Labs  Ht: 157.5 cm (62.01\"); Wt: 49 kg (108 lb 0.4 oz)   Temp Readings from Last 1 Encounters:   11/08/24 98.2 °F (36.8 °C) (Oral)     Estimated Creatinine Clearance: 88.3 mL/min (A) (by C-G formula based on SCr of 0.38 mg/dL (L)).     Results from last 7 days   Lab Units 11/08/24  0537 11/07/24  0348 11/06/24  0325   CREATININE mg/dL 0.38* 0.43* 0.47*   WBC 10*3/mm3 24.14* 17.68* 15.24*     Assessment/Plan    Vanc trough resulted at 7.5 mcg/mL ~9 hours post dose. This corresponds to a predicted AUC of 312 mg/L.hr. Will adjust dose.     Current Vancomycin Dose: 1000 mg IV every 12 hours; provides a predicted  mg/L.hr   Next Level Date and Time: Vanc Random is scheduled for 11/10 at 0600 with morning labs  We will continue to monitor patient changes and renal function until vancomycin is discontinued or patient is discharged.    Thank you for involving pharmacy in this patient's care. Please contact pharmacy with any questions or concerns.       Karishma Faria, PharmD  Clinical Pharmacist  "

## 2024-11-08 NOTE — PLAN OF CARE
Goal Outcome Evaluation:           Progress: improving  Outcome Evaluation: POD 8 R humurus IM nailing. Moderate swelling RUE. Nystatin ordered d/t complaint of itching in crease of elbow. Tachycardic. Afebrile. ID rec continue IV abx d/t increased WBC count. Vanc random 11/10. Tramadol for pain. Assist x 1 BRP HHA. Incontinent of urine, voiding per purewick. Q2 turn. Educated on IS. Plans [facility] when medically ready.

## 2024-11-08 NOTE — PLAN OF CARE
Goal Outcome Evaluation:  Plan of Care Reviewed With: patient           Outcome Evaluation: Patient remains stable. Ambulating with assist x1-2 BSC. Voiding per purewick. Sling in place, dressing cdi. Pain managed with prn pain meds. Small BM this shift. 1L O2 when sleeping. Plans to d/c to [facility].

## 2024-11-09 LAB
ALBUMIN SERPL-MCNC: 2.3 G/DL (ref 3.5–5.2)
ANION GAP SERPL CALCULATED.3IONS-SCNC: 7 MMOL/L (ref 5–15)
BUN SERPL-MCNC: 10 MG/DL (ref 8–23)
BUN/CREAT SERPL: 28.6 (ref 7–25)
CALCIUM SPEC-SCNC: 7.6 MG/DL (ref 8.6–10.5)
CHLORIDE SERPL-SCNC: 102 MMOL/L (ref 98–107)
CO2 SERPL-SCNC: 27 MMOL/L (ref 22–29)
CREAT SERPL-MCNC: 0.35 MG/DL (ref 0.57–1)
DEPRECATED RDW RBC AUTO: 38.2 FL (ref 37–54)
EGFRCR SERPLBLD CKD-EPI 2021: 102.2 ML/MIN/1.73
ERYTHROCYTE [DISTWIDTH] IN BLOOD BY AUTOMATED COUNT: 13.3 % (ref 12.3–15.4)
GLUCOSE SERPL-MCNC: 170 MG/DL (ref 65–99)
HCT VFR BLD AUTO: 31.9 % (ref 34–46.6)
HGB BLD-MCNC: 9.9 G/DL (ref 12–15.9)
MAGNESIUM SERPL-MCNC: 1.7 MG/DL (ref 1.6–2.4)
MCH RBC QN AUTO: 24.9 PG (ref 26.6–33)
MCHC RBC AUTO-ENTMCNC: 31 G/DL (ref 31.5–35.7)
MCV RBC AUTO: 80.2 FL (ref 79–97)
PHOSPHATE SERPL-MCNC: 3.6 MG/DL (ref 2.5–4.5)
PLATELET # BLD AUTO: 415 10*3/MM3 (ref 140–450)
PMV BLD AUTO: 9.2 FL (ref 6–12)
POTASSIUM SERPL-SCNC: 4.2 MMOL/L (ref 3.5–5.2)
PROCALCITONIN SERPL-MCNC: 0.79 NG/ML (ref 0–0.25)
RBC # BLD AUTO: 3.98 10*6/MM3 (ref 3.77–5.28)
SODIUM SERPL-SCNC: 136 MMOL/L (ref 136–145)
WBC NRBC COR # BLD AUTO: 22.96 10*3/MM3 (ref 3.4–10.8)

## 2024-11-09 PROCEDURE — 85027 COMPLETE CBC AUTOMATED: CPT | Performed by: STUDENT IN AN ORGANIZED HEALTH CARE EDUCATION/TRAINING PROGRAM

## 2024-11-09 PROCEDURE — 63710000001 METHYLPREDNISOLONE 4 MG TABLET THERAPY PACK 21 EACH DISP PACK: Performed by: HOSPITALIST

## 2024-11-09 PROCEDURE — 25810000003 SODIUM CHLORIDE 0.9 % SOLUTION 250 ML FLEX CONT: Performed by: HOSPITALIST

## 2024-11-09 PROCEDURE — 25810000003 SODIUM CHLORIDE 0.9 % SOLUTION 250 ML FLEX CONT: Performed by: INTERNAL MEDICINE

## 2024-11-09 PROCEDURE — 80069 RENAL FUNCTION PANEL: CPT | Performed by: INTERNAL MEDICINE

## 2024-11-09 PROCEDURE — 25010000002 VANCOMYCIN HCL 1.25 G RECONSTITUTED SOLUTION 1 EACH VIAL: Performed by: INTERNAL MEDICINE

## 2024-11-09 PROCEDURE — 25010000002 CEFTRIAXONE PER 250 MG: Performed by: STUDENT IN AN ORGANIZED HEALTH CARE EDUCATION/TRAINING PROGRAM

## 2024-11-09 PROCEDURE — 83735 ASSAY OF MAGNESIUM: CPT | Performed by: STUDENT IN AN ORGANIZED HEALTH CARE EDUCATION/TRAINING PROGRAM

## 2024-11-09 PROCEDURE — 97530 THERAPEUTIC ACTIVITIES: CPT

## 2024-11-09 PROCEDURE — 25010000002 VANCOMYCIN 1 G RECONSTITUTED SOLUTION 1 EACH VIAL: Performed by: HOSPITALIST

## 2024-11-09 PROCEDURE — 84145 PROCALCITONIN (PCT): CPT | Performed by: HOSPITALIST

## 2024-11-09 RX ADMIN — TRAMADOL HYDROCHLORIDE 50 MG: 50 TABLET, COATED ORAL at 04:01

## 2024-11-09 RX ADMIN — METHYLPREDNISOLONE 4 MG: 4 TABLET ORAL at 08:30

## 2024-11-09 RX ADMIN — ACETAMINOPHEN 1000 MG: 500 TABLET ORAL at 04:01

## 2024-11-09 RX ADMIN — METHYLPREDNISOLONE 4 MG: 4 TABLET ORAL at 17:08

## 2024-11-09 RX ADMIN — VANCOMYCIN HYDROCHLORIDE 1000 MG: 1 INJECTION, POWDER, LYOPHILIZED, FOR SOLUTION INTRAVENOUS at 08:28

## 2024-11-09 RX ADMIN — METHYLPREDNISOLONE 4 MG: 4 TABLET ORAL at 13:21

## 2024-11-09 RX ADMIN — ACETAMINOPHEN 1000 MG: 500 TABLET ORAL at 12:21

## 2024-11-09 RX ADMIN — ACETAMINOPHEN 1000 MG: 500 TABLET ORAL at 21:00

## 2024-11-09 RX ADMIN — METHYLPREDNISOLONE 8 MG: 4 TABLET ORAL at 21:01

## 2024-11-09 RX ADMIN — SENNOSIDES AND DOCUSATE SODIUM 2 TABLET: 50; 8.6 TABLET ORAL at 08:27

## 2024-11-09 RX ADMIN — POLYETHYLENE GLYCOL 3350 17 G: 17 POWDER, FOR SOLUTION ORAL at 08:27

## 2024-11-09 RX ADMIN — VANCOMYCIN HYDROCHLORIDE 1250 MG: 1.25 INJECTION, POWDER, LYOPHILIZED, FOR SOLUTION INTRAVENOUS at 21:00

## 2024-11-09 RX ADMIN — TRAMADOL HYDROCHLORIDE 50 MG: 50 TABLET, COATED ORAL at 12:20

## 2024-11-09 RX ADMIN — CEFTRIAXONE 2000 MG: 2 INJECTION, POWDER, FOR SOLUTION INTRAMUSCULAR; INTRAVENOUS at 15:24

## 2024-11-09 RX ADMIN — TRAMADOL HYDROCHLORIDE 50 MG: 50 TABLET, COATED ORAL at 18:24

## 2024-11-09 RX ADMIN — AMLODIPINE BESYLATE 10 MG: 10 TABLET ORAL at 08:27

## 2024-11-09 RX ADMIN — NYSTATIN: 100000 POWDER TOPICAL at 08:31

## 2024-11-09 RX ADMIN — PANTOPRAZOLE SODIUM 40 MG: 40 TABLET, DELAYED RELEASE ORAL at 07:00

## 2024-11-09 NOTE — PROGRESS NOTES
"  Infectious Diseases Progress Note    Nancy Soria MD     [facility]  [place]: 6 days  Patient Identification:  Name: Madelyn Madrid  Age: 84 y.o.  Sex: female  :  1940  MRN: [id number]         Primary Care Physician: Therese Martinez MD        Subjective: Pain and discomfort is slightly better.  Interval History: See consultation note.  2024 steroids started.  Objective:    Scheduled Meds:acetaminophen, 1,000 mg, Oral, Q8H  amLODIPine, 10 mg, Oral, Daily  cefTRIAXone, 2,000 mg, Intravenous, Q24H  [Held by provider] enoxaparin, 40 mg, Subcutaneous, Nightly  [Held by provider] escitalopram, 20 mg, Oral, QAM  [START ON 11/10/2024] methylPREDNISolone, 4 mg, Oral, 4x Daily Taper  [START ON 2024] methylPREDNISolone, 4 mg, Oral, TID Around Food  [START ON 2024] methylPREDNISolone, 4 mg, Oral, Before Breakfast  [START ON 2024] methylPREDNISolone, 4 mg, Oral, Tonight  [START ON 2024] methylPREDNISolone, 4 mg, Oral, Before Breakfast  methylPREDNISolone, 8 mg, Oral, Tonight  nystatin, , Topical, Q12H  pantoprazole, 40 mg, Oral, Q AM  polyethylene glycol, 17 g, Oral, BID AC  senna-docusate sodium, 2 tablet, Oral, BID  traMADol, 50 mg, Oral, Once  vancomycin, 1,250 mg, Intravenous, Q12H      Continuous Infusions:Pharmacy to dose vancomycin,         Vital signs in last 24 hours:  Temp:  [97.4 °F (36.3 °C)-98.1 °F (36.7 °C)] 97.7 °F (36.5 °C)  Heart Rate:  [] 105  Resp:  [16-20] 16  BP: (115-136)/(66-76) 115/70    Intake/Output:    Intake/Output Summary (Last 24 hours) at 2024 1834  Last data filed at 2024 1753  Gross per 24 hour   Intake 1460 ml   Output 1400 ml   Net 60 ml       Exam:  /70 (BP Location: Left arm, Patient Position: Lying)   Pulse 105   Temp 97.7 °F (36.5 °C) (Oral)   Resp 16   Ht 157.5 cm (62.01\")   Wt 49 kg (108 lb 0.4 oz)   LMP  (LMP Unknown)   SpO2 94%   BMI 19.75 kg/m²   Patient is examined using the personal protective " equipment as per guidelines from infection control for this particular patient as enacted.  Hand washing was performed before and after patient interaction.  General Appearance:  Emaciated chronically ill nontoxic-appearing elderly female                          Head:    Normocephalic, without obvious abnormality, atraumatic                           Eyes:    PERRL, conjunctivae/corneas clear, EOM's intact, both eyes                         Throat:   Lips, tongue, gums normal; oral mucosa pink and moist                           Neck:   Supple, symmetrical, trachea midline, no JVD                         Lungs:    Clear to auscultation bilaterally, respirations unlabored                 Chest Wall:    No tenderness or deformity                          Heart:  S1-S2 regular.                  Abdomen:   Soft nontender                 Extremities: Proximal humerus surgical site clean with no drainage, right arm significantly swollen with slight decrease in erythema extending all the way to her hand from mid arm. Wrist and elbow joint functional.  Able to move fingers and make a fist with her right hand.                        Pulses:   Pulses palpable in all extremities                            Skin: Erythematous changes and swelling of the right arm noted.                  Neurologic: Hard of hearing but oriented x 3       Data Review:    I reviewed the patient's new clinical results.  Results from last 7 days   Lab Units 11/09/24  0451 11/08/24  0537 11/07/24  0348 11/06/24  0325 11/05/24  1157 11/05/24  0319 11/04/24  0324 11/03/24  0353   WBC 10*3/mm3 22.96* 24.14* 17.68* 15.24*  --  13.07* 9.91 11.09*   HEMOGLOBIN g/dL 9.9* 10.1* 9.5* 9.7* 11.0* 9.8* 10.9* 11.8*   PLATELETS 10*3/mm3 415 363 324 315  --  327 325 304     Results from last 7 days   Lab Units 11/09/24  0451 11/08/24  0537 11/07/24  0348 11/06/24  0325 11/05/24  1614 11/05/24  0319 11/05/24  0005 11/04/24  1355 11/04/24  0324 11/03/24  1154  11/03/24  0353   SODIUM mmol/L 136 132* 134* 132* 133* 136 133*  --  128*   < > 130*   POTASSIUM mmol/L 4.2 3.9 4.0 4.3  --  5.0  --  3.9 5.4*  --  4.5   CHLORIDE mmol/L 102 99 99 98  --  101  --   --  99  --  98   CO2 mmol/L 27.0 23.0 27.5 26.4  --  24.4  --   --  21.8*  --  20.2*   BUN mg/dL 10 9 11 15  --  23  --   --  34*  --  33*   CREATININE mg/dL 0.35* 0.38* 0.43* 0.47*  --  0.54*  --   --  0.73  --  0.84   CALCIUM mg/dL 7.6* 7.3* 7.7* 7.8*  --  7.9*  --   --  8.6  --  8.7   GLUCOSE mg/dL 170* 100* 101* 102*  --  110*  --   --  99  --  95    < > = values in this interval not displayed.     Microbiology Results (last 10 days)       Procedure Component Value - Date/Time    MRSA Screen, PCR (Inpatient) - Swab, Nares [[id number]]  (Normal) Collected: 11/06/24 1224    Lab Status: Final result Specimen: Swab from Nares Updated: 11/06/24 1426     MRSA PCR No MRSA Detected    Narrative:      The negative predictive value of this diagnostic test is high and should only be used to consider de-escalating anti-MRSA therapy. A positive result may indicate colonization with MRSA and must be correlated clinically.    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    Surgery, elective    Hypertension    GERD (gastroesophageal reflux disease)    Depression    COPD (chronic obstructive pulmonary disease)    Severe malnutrition  1-mechanical changes associated with operative procedure involving placement of humerus intramedullary implant with attempt  to secure and create endosteal seal and hence changes in the CT scan of the right upper extremity causing ulcers enlargement of the humerus and triceps with  2-reactive leukocytosis due to ongoing stress involving the right upper extremity  3-possible evolving cellulitis with erythema and discomfort is for distal from the incision with no obvious evidence of any surgical drainage or erythema around the proximal surgical site  4-increased swelling of the right upper extremity due to above as well as lack of mobility and use of the right upper extremity along with dependency  5-metastatic lung cancer  6-other diagnoses per primary team.     Recommendations/Discussions:  See my discussion and recommendation 1116 11/7/2024.  Discussed with patient's family as well as with Dr. Brooks about continuation of current antibiotic therapy despite initial plans for de-escalation given the continued rise in white blood cell count and pending intervention decision by orthopedic surgery service as well as consideration for the use of steroids/anti-inflammatory agents.  If she continues to do well and would recommend stopping antibiotics on 11/11/2024.  Nancy Soria MD  11/9/2024  18:34 EST    Parts of this note may be an electronic transcription/translation of spoken language to printed text using the Dragon dictation system.

## 2024-11-09 NOTE — PLAN OF CARE
Goal Outcome Evaluation:  Plan of Care Reviewed With: patient        Progress: no change  Outcome Evaluation: Patient up with assist x 1 and gait belt. Makes needs known. Sitting up in chair. PRN pain meds given as ordered. Plans for rehab at d/c.  No s/s of distress noted.

## 2024-11-09 NOTE — ANESTHESIA POSTPROCEDURE EVALUATION
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

## 2024-11-09 NOTE — PLAN OF CARE
Goal Outcome Evaluation:  Plan of Care Reviewed With: patient           Outcome Evaluation: Patient remains stable. Alert and oriented. Ambulating with assist x1. Voiding per purewick d/t incontinence. Tachycardic. IV abx given per order. Pain managed with prn pain meds. Plans to d/c to [facility].

## 2024-11-09 NOTE — PROGRESS NOTES
"[facility] Clinical Pharmacy Services: Vancomycin Monitoring Note    Madelyn Madrid is a 84 y.o. female who is on day 4/14 of pharmacy to dose vancomycin for R arm postoperative cellulitis.     Previous Vancomycin Dose: 750 mg IV every 12 hours    Updated Cultures and Sensitivities:   11/5 BCx x2 sets NGTD   11/6: MRSA PCR-negative    Results from last 7 days   Lab Units 11/08/24  1148   VANCOMYCIN TR mcg/mL 7.50     Vitals/Labs  Ht: 157.5 cm (62.01\"); Wt: 49 kg (108 lb 0.4 oz)   Temp Readings from Last 1 Encounters:   11/09/24 97.4 °F (36.3 °C) (Oral)     Estimated Creatinine Clearance: 95.9 mL/min (A) (by C-G formula based on SCr of 0.35 mg/dL (L)).     Results from last 7 days   Lab Units 11/09/24  0451 11/08/24  0537 11/07/24  0348   CREATININE mg/dL 0.35* 0.38* 0.43*   WBC 10*3/mm3 22.96* 24.14* 17.68*     Assessment/Plan    Current Vancomycin Dose: 1000 mg IV every 12 hours; provides a predicted  mg/L.hr   Increasing Vancomycin Dose to: 1250 mg IV every 12 hours; provides a predicted  mg/L.hr   Next Level Date and Time: Vanc Trough is scheduled for 11/10 at 2000  We will continue to monitor patient changes and renal function until vancomycin is discontinued or patient is discharged    Thank you for involving pharmacy in this patient's care. Please contact pharmacy with any questions or concerns.       Reji Conte, PharmD  11/09/24 08:30 EST      "

## 2024-11-09 NOTE — PROGRESS NOTES
[facility] Progress Note      Patient Name: Madelyn Madrid  : 1940  MRN: [id number]  Primary Care Physician:  Therese Martinez MD  Date of admission: 10/31/2024    Subjective     Interval History:   Follow-up hyponatremia.   Doing well today.  Appetite is marginal  Review of Systems:   As noted above    Objective     Vitals:   Temp:  [97.4 °F (36.3 °C)-99.3 °F (37.4 °C)] 97.5 °F (36.4 °C)  Heart Rate:  [] 98  Resp:  [16-20] 18  BP: (115-136)/(55-76) 115/66  Flow (L/min) (Oxygen Therapy):  [1] 1    Intake/Output Summary (Last 24 hours) at 2024 1035  Last data filed at 2024 0920  Gross per 24 hour   Intake 1340 ml   Output 2200 ml   Net -860 ml       Physical Exam:    General: Chronically ill.  Fatigued.  Falls asleep quickly.  Hard of hearing.  Neck no JVD  HEENT oral mucosa  dry.  Nonicteric sclera  Lungs clear to auscultation bilaterally.  Heart: Regular rate and rhythm no S3 or rub.  Abdomen:+ Bowel sounds,  soft nontender  Extremities no edema.  Right arm in sling.  Neuro: Awake and alert.  Speech normal.      Scheduled Meds:     acetaminophen, 1,000 mg, Oral, Q8H  amLODIPine, 10 mg, Oral, Daily  cefTRIAXone, 2,000 mg, Intravenous, Q24H  [Held by provider] enoxaparin, 40 mg, Subcutaneous, Nightly  [Held by provider] escitalopram, 20 mg, Oral, QAM  methylPREDNISolone, 4 mg, Oral, TID Around Food  [START ON 11/10/2024] methylPREDNISolone, 4 mg, Oral, 4x Daily Taper  [START ON 2024] methylPREDNISolone, 4 mg, Oral, TID Around Food  [START ON 2024] methylPREDNISolone, 4 mg, Oral, Before Breakfast  [START ON 2024] methylPREDNISolone, 4 mg, Oral, Tonight  [START ON 2024] methylPREDNISolone, 4 mg, Oral, Before Breakfast  methylPREDNISolone, 8 mg, Oral, Tonight  nystatin, , Topical, Q12H  pantoprazole, 40 mg, Oral, Q AM  polyethylene glycol, 17 g, Oral, BID AC  senna-docusate sodium, 2 tablet, Oral, BID  traMADol, 50 mg, Oral,  Once  vancomycin, 1,250 mg, Intravenous, Q12H      IV Meds:   Pharmacy to dose vancomycin,         Results Reviewed:   I have personally reviewed the results from the time of this admission to 11/9/2024 10:35 EST     Results from last 7 days   Lab Units 11/09/24 0451 11/08/24 0537 11/07/24 0348 11/06/24 0325 11/03/24  1154 11/03/24  0353   SODIUM mmol/L 136 132* 134* 132*   < > 130*   POTASSIUM mmol/L 4.2 3.9 4.0 4.3   < > 4.5   CHLORIDE mmol/L 102 99 99 98   < > 98   CO2 mmol/L 27.0 23.0 27.5 26.4   < > 20.2*   BUN mg/dL 10 9 11 15   < > 33*   CREATININE mg/dL 0.35* 0.38* 0.43* 0.47*   < > 0.84   CALCIUM mg/dL 7.6* 7.3* 7.7* 7.8*   < > 8.7   BILIRUBIN mg/dL  --   --   --  0.2  --  0.4   ALK PHOS U/L  --   --   --  131*  --  78   ALT (SGPT) U/L  --   --   --  6  --  7   AST (SGOT) U/L  --   --   --  18  --  27   GLUCOSE mg/dL 170* 100* 101* 102*   < > 95    < > = values in this interval not displayed.       Estimated Creatinine Clearance: 95.9 mL/min (A) (by C-G formula based on SCr of 0.35 mg/dL (L)).    Results from last 7 days   Lab Units 11/09/24 0451 11/08/24 0537 11/07/24 0348   MAGNESIUM mg/dL 1.7 2.3 1.5*   PHOSPHORUS mg/dL 3.6 3.1 3.4             Results from last 7 days   Lab Units 11/09/24 0451 11/08/24 0537 11/07/24 0348 11/06/24 0325 11/05/24 1157 11/05/24  0319   WBC 10*3/mm3 22.96* 24.14* 17.68* 15.24*  --  13.07*   HEMOGLOBIN g/dL 9.9* 10.1* 9.5* 9.7* 11.0* 9.8*   PLATELETS 10*3/mm3 415 363 324 315  --  327             Assessment / Plan     ASSESSMENT:  Hyponatremia.  TSH and cortisol normal.  Sodium 134.  Urine studies look more consistent with a prerenal picture.  Discontinued fluid restriction .  Sodium above 130 in safe range.  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  Right humerus fracture.  Status post right humeral intramedullary nail 11/4/2024.   Hypertension controlled.  4.  Chronic heart failure preserved ejection fraction.  Grade 1 dysfunction on echo 9/24.   Compensated.  5.  Fever.  Blood cultures negative so far.  Dr. Soria evaluated.  Right upper extremity postop surgical site infection.  Now on Kefzol.  PLAN:  Monitor chemistries.  Discussed with patient and family at bedside.  Thank you for involving us in the care of Madelyn Madrid.  Please feel free to call with any questions.    Baudilio Alfredo MD  11/09/24  10:35 [facility]    [facility]  [phone]    Please note that portions of this note were completed with a voice recognition program.

## 2024-11-09 NOTE — PROGRESS NOTES
[facility] Progress Note      Patient Name: Madelyn Madrid  : 1940  MRN: [id number]  Primary Care Physician:  Therese Martinez MD  Date of admission: 10/31/2024    Subjective     Interval History:   Follow-up hyponatremia.   Patient seems to be doing well today.  No new complaints except for mild shoulder pain.  Denies any nausea or vomiting.  Review of Systems:   As noted above    Objective     Vitals:   Temp:  [97.4 °F (36.3 °C)-99.3 °F (37.4 °C)] 97.5 °F (36.4 °C)  Heart Rate:  [] 98  Resp:  [16-20] 18  BP: (115-136)/(55-76) 115/66  Flow (L/min) (Oxygen Therapy):  [1] 1    Intake/Output Summary (Last 24 hours) at 2024 1036  Last data filed at 2024 0920  Gross per 24 hour   Intake 1340 ml   Output 2200 ml   Net -860 ml       Physical Exam:    General: Chronically ill.  Fatigued.  Falls asleep quickly.  Hard of hearing.  Neck no JVD  HEENT oral mucosa  dry.  Nonicteric sclera  Lungs clear to auscultation bilaterally.  Heart: Regular rate and rhythm no S3 or rub.  Abdomen:+ Bowel sounds,  soft nontender  Extremities no edema.  Right arm in sling.  Neuro: Awake and alert.  Speech normal.      Scheduled Meds:     acetaminophen, 1,000 mg, Oral, Q8H  amLODIPine, 10 mg, Oral, Daily  cefTRIAXone, 2,000 mg, Intravenous, Q24H  [Held by provider] enoxaparin, 40 mg, Subcutaneous, Nightly  [Held by provider] escitalopram, 20 mg, Oral, QAM  methylPREDNISolone, 4 mg, Oral, TID Around Food  [START ON 11/10/2024] methylPREDNISolone, 4 mg, Oral, 4x Daily Taper  [START ON 2024] methylPREDNISolone, 4 mg, Oral, TID Around Food  [START ON 2024] methylPREDNISolone, 4 mg, Oral, Before Breakfast  [START ON 2024] methylPREDNISolone, 4 mg, Oral, Tonight  [START ON 2024] methylPREDNISolone, 4 mg, Oral, Before Breakfast  methylPREDNISolone, 8 mg, Oral, Tonight  nystatin, , Topical, Q12H  pantoprazole, 40 mg, Oral, Q AM  polyethylene glycol, 17 g, Oral, BID  AC  senna-docusate sodium, 2 tablet, Oral, BID  traMADol, 50 mg, Oral, Once  vancomycin, 1,250 mg, Intravenous, Q12H      IV Meds:   Pharmacy to dose vancomycin,         Results Reviewed:   I have personally reviewed the results from the time of this admission to 11/9/2024 10:36 EST     Results from last 7 days   Lab Units 11/09/24 0451 11/08/24 0537 11/07/24 0348 11/06/24 0325 11/03/24  1154 11/03/24  0353   SODIUM mmol/L 136 132* 134* 132*   < > 130*   POTASSIUM mmol/L 4.2 3.9 4.0 4.3   < > 4.5   CHLORIDE mmol/L 102 99 99 98   < > 98   CO2 mmol/L 27.0 23.0 27.5 26.4   < > 20.2*   BUN mg/dL 10 9 11 15   < > 33*   CREATININE mg/dL 0.35* 0.38* 0.43* 0.47*   < > 0.84   CALCIUM mg/dL 7.6* 7.3* 7.7* 7.8*   < > 8.7   BILIRUBIN mg/dL  --   --   --  0.2  --  0.4   ALK PHOS U/L  --   --   --  131*  --  78   ALT (SGPT) U/L  --   --   --  6  --  7   AST (SGOT) U/L  --   --   --  18  --  27   GLUCOSE mg/dL 170* 100* 101* 102*   < > 95    < > = values in this interval not displayed.       Estimated Creatinine Clearance: 95.9 mL/min (A) (by C-G formula based on SCr of 0.35 mg/dL (L)).    Results from last 7 days   Lab Units 11/09/24 0451 11/08/24 0537 11/07/24  0348   MAGNESIUM mg/dL 1.7 2.3 1.5*   PHOSPHORUS mg/dL 3.6 3.1 3.4             Results from last 7 days   Lab Units 11/09/24 0451 11/08/24 0537 11/07/24 0348 11/06/24 0325 11/05/24  1157 11/05/24  0319   WBC 10*3/mm3 22.96* 24.14* 17.68* 15.24*  --  13.07*   HEMOGLOBIN g/dL 9.9* 10.1* 9.5* 9.7* 11.0* 9.8*   PLATELETS 10*3/mm3 415 363 324 315  --  327             Assessment / Plan     ASSESSMENT:  Hyponatremia.  TSH and cortisol normal.  Sodium 136.  Urine studies look more consistent with a prerenal picture.  Discontinued fluid restriction .    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  Right humerus fracture.  Status post right humeral intramedullary nail 11/4/2024.   Hypertension controlled.  4.  Chronic heart failure preserved ejection  fraction.  Grade 1 dysfunction on echo 9/24.  Compensated.  5.  Fever.  Blood cultures negative so far.  Dr. Soria evaluated.  Right upper extremity postop surgical site infection.  Now on Kefzol.  PLAN:  Kidney function and electrolytes are acceptable  Nephrology will sign off  Please call if you have questions or concerns  Thank you for involving us in the care of Madelyn Madrid.  Please feel free to call with any questions.    Baudilio Alfredo MD  11/09/24  10:36 [facility]    [facility]  [phone]    Please note that portions of this note were completed with a voice recognition program.

## 2024-11-09 NOTE — THERAPY TREATMENT NOTE
Patient Name: Madelyn Madrid  : 1940    MRN: [id number]                              Today's Date: 2024       Admit Date: 10/31/2024    Visit Dx:     ICD-10-CM ICD-9-CM   1. 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    Surgery, elective    Hypertension    GERD (gastroesophageal reflux disease)    Depression    COPD (chronic obstructive pulmonary disease)    Severe malnutrition     Past Medical History:   Diagnosis Date    Arthritis     COPD (chronic obstructive pulmonary disease) 10/2/24    CT scan    Depression     Emphysema of lung     CT scan    Endometrial cancer     GERD (gastroesophageal reflux disease)     Goiter     [place] (hard of hearing)     Hx of radiation therapy     GOES 5 TIMES A WEEK    Hypertension     Incontinence of urine     Lung mass     Pancreatitis      Past Surgical History:   Procedure Laterality Date    BRONCHOSCOPY WITH ION ROBOTIC ASSIST N/A 10/23/2024    Procedure: ROBOTIC BRONCHOSCOPY WITH FINE NEEDLE ASPIRATION AND CRYO BIOPSY, BRONCHOSCOPY WITH BRONCHOALVEOLAR LAVAGE;  Surgeon: Deann Iqbal MD;  Location: [facility] ENDOSCOPY;  Service: Robotics - Pulmonary;  Laterality: N/A;    COLONOSCOPY      HYSTERECTOMY      LAPAROSCOPY HYSTEROSCOPY ENDOMETRIAL ABLATION        General Information       Row Name 24 1527          Physical Therapy Time and Intention    Document Type therapy note (daily note)  -CB     Mode of Treatment physical therapy;individual therapy  -CB       Row Name 24 1527          General Information    Existing Precautions/Restrictions fall  NWB RUE in sling  -CB       Row Name 24 1527          Cognition    Orientation Status (Cognition)  oriented x 3  -CB       Row Name 11/09/24 1527          Safety Issues/Impairments Affecting Functional Mobility    Impairments Affecting Function (Mobility) balance;endurance/activity tolerance;pain;strength  -CB               User Key  (r) = Recorded By, (t) = Taken By, (c) = Cosigned By      Initials Name Provider Type    Kylie Mensah, LEXY Physical Therapist                   Mobility       Row Name 11/09/24 1528          Bed Mobility    Comment, (Bed Mobility) UIC pre and post session  -CB       Row Name 11/09/24 1528          Sit-Stand Transfer    Sit-Stand Cloud (Transfers) minimum assist (75% patient effort)  -CB     Assistive Device (Sit-Stand Transfers) --  HHA  -CB     Comment, (Sit-Stand Transfer) x2 STS reps  -CB       Row Name 11/09/24 1528          Gait/Stairs (Locomotion)    Cloud Level (Gait) minimum assist (75% patient effort);verbal cues  -CB     Assistive Device (Gait) --  HHA  -CB     Distance in Feet (Gait) 40  10  -CB     Deviations/Abnormal Patterns (Gait) gait speed decreased;stride length decreased;matty decreased;steppage  -CB     Bilateral Gait Deviations forward flexed posture  -CB     Comment, (Gait/Stairs) unsteady during ambulation and steppage gait  -CB               User Key  (r) = Recorded By, (t) = Taken By, (c) = Cosigned By      Initials Name Provider Type    Kylie Mensah PT Physical Therapist                   Obj/Interventions       Row Name 11/09/24 1530          Balance    Balance Assessment standing static balance;standing dynamic balance;sitting static balance  -CB     Static Sitting Balance standby assist  -CB     Static Standing Balance minimal assist;verbal cues  -CB     Dynamic Standing Balance minimal assist;verbal cues  -CB     Position/Device Used, Standing Balance supported  -CB     Balance Interventions sitting;standing;sit to stand;supported;static;dynamic;minimal challenge  -CB               User Key  (r) = Recorded By, (t) = Taken By,  (c) = Cosigned By      Initials Name Provider Type    Kylie Mensah, PT Physical Therapist                   Goals/Plan    No documentation.                  Clinical Impression       Row Name 11/09/24 1531          Pain    Pre/Posttreatment Pain Comment pt does not report pain  -CB       Row Name 11/09/24 1531          Plan of Care Review    Plan of Care Reviewed With patient  -CB     Progress improving  -CB     Outcome Evaluation Patient seen this afternoon for PT. She completed STSx2 with HHA requiring Evert and then ambulated 40ft and 10ft using HHA requiring Evert. Steppage gait pattern noted and unsteadiness noted. Pt RUE swollen and warm. RN notified and elevated on pillow. Will continue to progress as pt tolerates.  -CB       Row Name 11/09/24 1531          Positioning and Restraints    Pre-Treatment Position sitting in chair/recliner  -CB     Post Treatment Position chair  -CB     In Chair notified nsg;reclined;call light within reach;encouraged to call for assist;exit alarm on;with family/caregiver  -CB               User Key  (r) = Recorded By, (t) = Taken By, (c) = Cosigned By      Initials Name Provider Type    Kylie Mensah, PT Physical Therapist                   Outcome Measures       Row Name 11/09/24 1537 11/09/24 0830       How much help from another person do you currently need...    Turning from your back to your side while in flat bed without using bedrails? 3  -CB 3  -KM    Moving from lying on back to sitting on the side of a flat bed without bedrails? 3  -CB 3  -KM    Moving to and from a bed to a chair (including a wheelchair)? 3  -CB 3  -KM    Standing up from a chair using your arms (e.g., wheelchair, bedside chair)? 3  -CB 3  -KM    Climbing 3-5 steps with a railing? 2  -CB 2  -KM    To walk in hospital room? 3  -CB 3  -KM    AM-PAC 6 Clicks Score (PT) 17  -CB 17  -KM    Highest Level of Mobility Goal 5 --> Static standing  -CB 5 --> Static standing  -KM      Row Name 11/09/24 1537           Functional Assessment    Outcome Measure Options AM-PAC 6 Clicks Basic Mobility (PT)  -CB               User Key  (r) = Recorded By, (t) = Taken By, (c) = Cosigned By      Initials Name Provider Type    Shama Menon, RN Registered Nurse    Kylie Mensah, PT Physical Therapist                                 Physical Therapy Education       Title: PT OT SLP Therapies (Done)       Topic: Physical Therapy (Done)       Point: Mobility training (Done)       Learning Progress Summary            Patient Acceptance, E,TB, VU,NR by CB at 11/9/2024 1538    Acceptance, E,D, VU,NR by MS at 11/7/2024 1452    Acceptance, E,D, VU,NR by MS at 11/5/2024 1019    Acceptance, E,D, VU,NR by MS at 11/4/2024 1005    Acceptance, E, VU,NR by CN at 11/2/2024 1623                      Point: Home exercise program (Done)       Learning Progress Summary            Patient Acceptance, E,D, VU,NR by MS at 11/7/2024 1452    Acceptance, E,TB,D, VU,NR by CB at 11/6/2024 1036    Acceptance, E,D, VU,NR by MS at 11/4/2024 1005    Acceptance, E, VU,NR by CN at 11/2/2024 1623                      Point: Body mechanics (Done)       Learning Progress Summary            Patient Acceptance, E,TB, VU,NR by CB at 11/9/2024 1538    Acceptance, E,D, VU,NR by MS at 11/7/2024 1452    Acceptance, E,D, VU,NR by MS at 11/5/2024 1019    Acceptance, E,D, VU,NR by MS at 11/4/2024 1005    Acceptance, E, VU,NR by CN at 11/2/2024 1623                      Point: Precautions (Done)       Learning Progress Summary            Patient Acceptance, E,TB, VU,NR by CB at 11/9/2024 1538    Acceptance, E,D, VU,NR by MS at 11/7/2024 1452    Acceptance, E,D, VU,NR by MS at 11/5/2024 1019    Acceptance, E,D, VU,NR by MS at 11/4/2024 1005    Acceptance, E, VU,NR by CN at 11/2/2024 1623                                      User Key       Initials Effective Dates Name Provider Type Discipline    MS 06/16/21 -  Dwain Hendricks, PT Physical Therapist PT    EWA 06/16/21 -   Meg Alan, PT Physical Therapist PT    CB 10/22/21 -  Kylie Corcoran PT Physical Therapist PT                  PT Recommendation and Plan     Progress: improving  Outcome Evaluation: Patient seen this afternoon for PT. She completed STSx2 with HHA requiring Evert and then ambulated 40ft and 10ft using HHA requiring Evert. Steppage gait pattern noted and unsteadiness noted. Pt RUE swollen and warm. RN notified and elevated on pillow. Will continue to progress as pt tolerates.     Time Calculation:         PT Charges       Row Name 11/09/24 1538             Time Calculation    Start Time 1503  -CB      Stop Time 1520  -CB      Time Calculation (min) 17 min  -CB      PT Received On 11/09/24  -CB      PT - Next Appointment 11/11/24  -CB         Time Calculation- PT    Total Timed Code Minutes- PT 17 minute(s)  -CB         Timed Charges    98977 - PT Therapeutic Activity Minutes 17  -CB         Total Minutes    Timed Charges Total Minutes 17  -CB       Total Minutes 17  -CB                User Key  (r) = Recorded By, (t) = Taken By, (c) = Cosigned By      Initials Name Provider Type    CB Kylie Corcoran, PT Physical Therapist                  Therapy Charges for Today       Code Description Service Date Service Provider Modifiers Qty    66618495442 HC PT THERAPEUTIC ACT EA 15 MIN 11/9/2024 Kylie Corcoran, PT GP 1            PT G-Codes  Outcome Measure Options: AM-PAC 6 Clicks Basic Mobility (PT)  AM-PAC 6 Clicks Score (PT): 17  AM-PAC 6 Clicks Score (OT): 7       Kylie Corcoran PT  11/9/2024

## 2024-11-09 NOTE — PLAN OF CARE
Goal Outcome Evaluation:  Plan of Care Reviewed With: patient        Progress: improving  Outcome Evaluation: Patient seen this afternoon for PT. She completed STSx2 with HHA requiring Evert and then ambulated 40ft and 10ft using HHA requiring Evert. Steppage gait pattern noted and unsteadiness noted. Pt RUE swollen and warm. RN notified and elevated on pillow. Will continue to progress as pt tolerates.

## 2024-11-10 ENCOUNTER — APPOINTMENT (OUTPATIENT)
Dept: CARDIOLOGY | Facility: HOSPITAL | Age: 84
DRG: 492 | End: 2024-11-10
Payer: MEDICARE

## 2024-11-10 LAB
ALBUMIN SERPL-MCNC: 2.5 G/DL (ref 3.5–5.2)
ALP SERPL-CCNC: 172 U/L (ref 39–117)
ALT SERPL W P-5'-P-CCNC: 26 U/L (ref 1–33)
ANION GAP SERPL CALCULATED.3IONS-SCNC: 11 MMOL/L (ref 5–15)
AST SERPL-CCNC: 32 U/L (ref 1–32)
BACTERIA SPEC AEROBE CULT: NORMAL
BACTERIA SPEC AEROBE CULT: NORMAL
BASOPHILS # BLD AUTO: 0.06 10*3/MM3 (ref 0–0.2)
BASOPHILS NFR BLD AUTO: 0.3 % (ref 0–1.5)
BH CV UPPER VENOUS LEFT INTERNAL JUGULAR AUGMENT: NORMAL
BH CV UPPER VENOUS LEFT INTERNAL JUGULAR COMPRESS: NORMAL
BH CV UPPER VENOUS LEFT INTERNAL JUGULAR PHASIC: NORMAL
BH CV UPPER VENOUS LEFT INTERNAL JUGULAR SPONT: NORMAL
BH CV UPPER VENOUS LEFT SUBCLAVIAN AUGMENT: NORMAL
BH CV UPPER VENOUS LEFT SUBCLAVIAN COMPRESS: NORMAL
BH CV UPPER VENOUS LEFT SUBCLAVIAN PHASIC: NORMAL
BH CV UPPER VENOUS LEFT SUBCLAVIAN SPONT: NORMAL
BH CV UPPER VENOUS RIGHT AXILLARY AUGMENT: NORMAL
BH CV UPPER VENOUS RIGHT AXILLARY COMPRESS: NORMAL
BH CV UPPER VENOUS RIGHT AXILLARY PHASIC: NORMAL
BH CV UPPER VENOUS RIGHT AXILLARY SPONT: NORMAL
BH CV UPPER VENOUS RIGHT BASILIC FOREARM COMPRESS: NORMAL
BH CV UPPER VENOUS RIGHT BASILIC UPPER COMPRESS: NORMAL
BH CV UPPER VENOUS RIGHT BRACHIAL COMPRESS: NORMAL
BH CV UPPER VENOUS RIGHT CEPHALIC FOREARM COMPRESS: NORMAL
BH CV UPPER VENOUS RIGHT CEPHALIC UPPER COMPRESS: NORMAL
BH CV UPPER VENOUS RIGHT INTERNAL JUGULAR AUGMENT: NORMAL
BH CV UPPER VENOUS RIGHT INTERNAL JUGULAR COMPRESS: NORMAL
BH CV UPPER VENOUS RIGHT INTERNAL JUGULAR PHASIC: NORMAL
BH CV UPPER VENOUS RIGHT INTERNAL JUGULAR SPONT: NORMAL
BH CV UPPER VENOUS RIGHT RADIAL COMPRESS: NORMAL
BH CV UPPER VENOUS RIGHT SUBCLAVIAN AUGMENT: NORMAL
BH CV UPPER VENOUS RIGHT SUBCLAVIAN COMPRESS: NORMAL
BH CV UPPER VENOUS RIGHT SUBCLAVIAN PHASIC: NORMAL
BH CV UPPER VENOUS RIGHT SUBCLAVIAN SPONT: NORMAL
BH CV UPPER VENOUS RIGHT ULNAR COMPRESS: NORMAL
BILIRUB CONJ SERPL-MCNC: <0.2 MG/DL (ref 0–0.3)
BILIRUB INDIRECT SERPL-MCNC: ABNORMAL MG/DL
BILIRUB SERPL-MCNC: 0.2 MG/DL (ref 0–1.2)
BUN SERPL-MCNC: 14 MG/DL (ref 8–23)
BUN/CREAT SERPL: 31.8 (ref 7–25)
CALCIUM SPEC-SCNC: 8.2 MG/DL (ref 8.6–10.5)
CHLORIDE SERPL-SCNC: 100 MMOL/L (ref 98–107)
CO2 SERPL-SCNC: 24 MMOL/L (ref 22–29)
CREAT SERPL-MCNC: 0.44 MG/DL (ref 0.57–1)
DEPRECATED RDW RBC AUTO: 40.4 FL (ref 37–54)
EGFRCR SERPLBLD CKD-EPI 2021: 96.7 ML/MIN/1.73
EOSINOPHIL # BLD AUTO: 0 10*3/MM3 (ref 0–0.4)
EOSINOPHIL NFR BLD AUTO: 0 % (ref 0.3–6.2)
ERYTHROCYTE [DISTWIDTH] IN BLOOD BY AUTOMATED COUNT: 13.4 % (ref 12.3–15.4)
GLUCOSE SERPL-MCNC: 141 MG/DL (ref 65–99)
HCT VFR BLD AUTO: 37.2 % (ref 34–46.6)
HGB BLD-MCNC: 11 G/DL (ref 12–15.9)
IMM GRANULOCYTES # BLD AUTO: 0.5 10*3/MM3 (ref 0–0.05)
IMM GRANULOCYTES NFR BLD AUTO: 2.2 % (ref 0–0.5)
LYMPHOCYTES # BLD AUTO: 0.5 10*3/MM3 (ref 0.7–3.1)
LYMPHOCYTES NFR BLD AUTO: 2.2 % (ref 19.6–45.3)
MAGNESIUM SERPL-MCNC: 2 MG/DL (ref 1.6–2.4)
MCH RBC QN AUTO: 24.6 PG (ref 26.6–33)
MCHC RBC AUTO-ENTMCNC: 29.6 G/DL (ref 31.5–35.7)
MCV RBC AUTO: 83.2 FL (ref 79–97)
MONOCYTES # BLD AUTO: 0.58 10*3/MM3 (ref 0.1–0.9)
MONOCYTES NFR BLD AUTO: 2.6 % (ref 5–12)
NEUTROPHILS NFR BLD AUTO: 20.82 10*3/MM3 (ref 1.7–7)
NEUTROPHILS NFR BLD AUTO: 92.7 % (ref 42.7–76)
NRBC BLD AUTO-RTO: 0 /100 WBC (ref 0–0.2)
PD-L1 BY 22C3 TISS IMSTN DOC: POSITIVE
PHOSPHATE SERPL-MCNC: 3.6 MG/DL (ref 2.5–4.5)
PLATELET # BLD AUTO: 526 10*3/MM3 (ref 140–450)
PMV BLD AUTO: 8.9 FL (ref 6–12)
POTASSIUM SERPL-SCNC: 3.6 MMOL/L (ref 3.5–5.2)
POTASSIUM SERPL-SCNC: 5.8 MMOL/L (ref 3.5–5.2)
PROT SERPL-MCNC: 5.8 G/DL (ref 6–8.5)
RBC # BLD AUTO: 4.47 10*6/MM3 (ref 3.77–5.28)
SODIUM SERPL-SCNC: 135 MMOL/L (ref 136–145)
TEMPUS PD-L1 (22C3) COMBINED POSITIVE SCORE: 40
TEMPUS PD-L1 (22C3) TUMOR PROPORTION SCORE: 35 %
TEMPUS PORTAL: NORMAL
VANCOMYCIN TROUGH SERPL-MCNC: 14.3 MCG/ML (ref 5–20)
WBC NRBC COR # BLD AUTO: 22.46 10*3/MM3 (ref 3.4–10.8)

## 2024-11-10 PROCEDURE — 93971 EXTREMITY STUDY: CPT | Performed by: SURGERY

## 2024-11-10 PROCEDURE — 25010000002 VANCOMYCIN HCL 1.25 G RECONSTITUTED SOLUTION 1 EACH VIAL: Performed by: INTERNAL MEDICINE

## 2024-11-10 PROCEDURE — 63710000001 METHYLPREDNISOLONE 4 MG TABLET THERAPY PACK 21 EACH DISP PACK: Performed by: HOSPITALIST

## 2024-11-10 PROCEDURE — 25010000002 CEFTRIAXONE PER 250 MG: Performed by: STUDENT IN AN ORGANIZED HEALTH CARE EDUCATION/TRAINING PROGRAM

## 2024-11-10 PROCEDURE — 84132 ASSAY OF SERUM POTASSIUM: CPT | Performed by: HOSPITALIST

## 2024-11-10 PROCEDURE — 85025 COMPLETE CBC W/AUTO DIFF WBC: CPT | Performed by: INTERNAL MEDICINE

## 2024-11-10 PROCEDURE — 25810000003 SODIUM CHLORIDE 0.9 % SOLUTION 250 ML FLEX CONT: Performed by: STUDENT IN AN ORGANIZED HEALTH CARE EDUCATION/TRAINING PROGRAM

## 2024-11-10 PROCEDURE — 25010000002 VANCOMYCIN HCL 1.25 G RECONSTITUTED SOLUTION 1 EACH VIAL: Performed by: STUDENT IN AN ORGANIZED HEALTH CARE EDUCATION/TRAINING PROGRAM

## 2024-11-10 PROCEDURE — 83735 ASSAY OF MAGNESIUM: CPT | Performed by: STUDENT IN AN ORGANIZED HEALTH CARE EDUCATION/TRAINING PROGRAM

## 2024-11-10 PROCEDURE — 80076 HEPATIC FUNCTION PANEL: CPT | Performed by: INTERNAL MEDICINE

## 2024-11-10 PROCEDURE — 80202 ASSAY OF VANCOMYCIN: CPT | Performed by: INTERNAL MEDICINE

## 2024-11-10 PROCEDURE — 80048 BASIC METABOLIC PNL TOTAL CA: CPT | Performed by: INTERNAL MEDICINE

## 2024-11-10 PROCEDURE — 93971 EXTREMITY STUDY: CPT

## 2024-11-10 PROCEDURE — 84100 ASSAY OF PHOSPHORUS: CPT | Performed by: INTERNAL MEDICINE

## 2024-11-10 PROCEDURE — 25810000003 SODIUM CHLORIDE 0.9 % SOLUTION 250 ML FLEX CONT: Performed by: INTERNAL MEDICINE

## 2024-11-10 RX ADMIN — VANCOMYCIN HYDROCHLORIDE 1250 MG: 1.25 INJECTION, POWDER, LYOPHILIZED, FOR SOLUTION INTRAVENOUS at 09:15

## 2024-11-10 RX ADMIN — METHYLPREDNISOLONE 4 MG: 4 TABLET ORAL at 21:26

## 2024-11-10 RX ADMIN — NYSTATIN: 100000 POWDER TOPICAL at 09:13

## 2024-11-10 RX ADMIN — PANTOPRAZOLE SODIUM 40 MG: 40 TABLET, DELAYED RELEASE ORAL at 05:56

## 2024-11-10 RX ADMIN — TRAMADOL HYDROCHLORIDE 50 MG: 50 TABLET, COATED ORAL at 00:25

## 2024-11-10 RX ADMIN — METHYLPREDNISOLONE 4 MG: 4 TABLET ORAL at 18:17

## 2024-11-10 RX ADMIN — AMLODIPINE BESYLATE 10 MG: 10 TABLET ORAL at 09:13

## 2024-11-10 RX ADMIN — TRAMADOL HYDROCHLORIDE 50 MG: 50 TABLET, COATED ORAL at 18:16

## 2024-11-10 RX ADMIN — TRAMADOL HYDROCHLORIDE 50 MG: 50 TABLET, COATED ORAL at 06:36

## 2024-11-10 RX ADMIN — ACETAMINOPHEN 1000 MG: 500 TABLET ORAL at 21:25

## 2024-11-10 RX ADMIN — CEFTRIAXONE 2000 MG: 2 INJECTION, POWDER, FOR SOLUTION INTRAMUSCULAR; INTRAVENOUS at 15:59

## 2024-11-10 RX ADMIN — VANCOMYCIN HYDROCHLORIDE 1250 MG: 1.25 INJECTION, POWDER, LYOPHILIZED, FOR SOLUTION INTRAVENOUS at 21:26

## 2024-11-10 RX ADMIN — METHYLPREDNISOLONE 4 MG: 4 TABLET ORAL at 09:13

## 2024-11-10 RX ADMIN — ESCITALOPRAM 20 MG: 20 TABLET, FILM COATED ORAL at 09:13

## 2024-11-10 RX ADMIN — NYSTATIN: 100000 POWDER TOPICAL at 21:33

## 2024-11-10 RX ADMIN — TRAMADOL HYDROCHLORIDE 50 MG: 50 TABLET, COATED ORAL at 12:20

## 2024-11-10 RX ADMIN — ACETAMINOPHEN 1000 MG: 500 TABLET ORAL at 05:56

## 2024-11-10 RX ADMIN — METHYLPREDNISOLONE 4 MG: 4 TABLET ORAL at 12:21

## 2024-11-10 RX ADMIN — ACETAMINOPHEN 1000 MG: 500 TABLET ORAL at 12:20

## 2024-11-10 NOTE — PLAN OF CARE
Goal Outcome Evaluation:  Plan of Care Reviewed With: patient        Progress: no change  Outcome Evaluation: Patient A & O. Daughter at bedside.  Up with asst x1.  Dopplar on R arm was negative. Continues with IV atb therapy.  Rehab at d/c. No s/s of distress noted.

## 2024-11-10 NOTE — PROGRESS NOTES
[facility] Progress Note      Patient Name: Madelyn Madrid  : 1940  MRN: [id number]  Primary Care Physician:  Therese Martinez MD  Date of admission: 10/31/2024    Subjective     Interval History:   Follow-up hyponatremia.   Seen the patient again today because of hyperkalemia.  Review of Systems:   As noted above    Objective     Vitals:   Temp:  [97.7 °F (36.5 °C)-98.1 °F (36.7 °C)] 97.7 °F (36.5 °C)  Heart Rate:  [103-105] 103  Resp:  [16-17] 17  BP: (115-139)/(58-70) 139/58    Intake/Output Summary (Last 24 hours) at 11/10/2024 0946  Last data filed at 11/10/2024 0558  Gross per 24 hour   Intake 480 ml   Output 900 ml   Net -420 ml       Physical Exam:    General: Chronically ill.  Fatigued.  Falls asleep quickly.  Hard of hearing.  Neck no JVD  HEENT oral mucosa  dry.  Nonicteric sclera  Lungs clear to auscultation bilaterally.  Heart: Regular rate and rhythm no S3 or rub.  Abdomen:+ Bowel sounds,  soft nontender  Extremities no edema.  Right arm in sling.  Neuro: Awake and alert.  Speech normal.      Scheduled Meds:     acetaminophen, 1,000 mg, Oral, Q8H  amLODIPine, 10 mg, Oral, Daily  cefTRIAXone, 2,000 mg, Intravenous, Q24H  escitalopram, 20 mg, Oral, QAM  methylPREDNISolone, 4 mg, Oral, 4x Daily Taper  [START ON 2024] methylPREDNISolone, 4 mg, Oral, TID Around Food  [START ON 2024] methylPREDNISolone, 4 mg, Oral, Before Breakfast  [START ON 2024] methylPREDNISolone, 4 mg, Oral, Tonight  [START ON 2024] methylPREDNISolone, 4 mg, Oral, Before Breakfast  nystatin, , Topical, Q12H  pantoprazole, 40 mg, Oral, Q AM  polyethylene glycol, 17 g, Oral, BID AC  senna-docusate sodium, 2 tablet, Oral, BID  traMADol, 50 mg, Oral, Once  vancomycin, 1,250 mg, Intravenous, Q12H      IV Meds:   Pharmacy to dose vancomycin,         Results Reviewed:   I have personally reviewed the results from the time of this admission to 11/10/2024 09:46 EST     Results from  last 7 days   Lab Units 11/10/24  0349 11/09/24 0451 11/08/24 0537 11/07/24 0348 11/06/24  0325   SODIUM mmol/L 135* 136 132*   < > 132*   POTASSIUM mmol/L 5.8* 4.2 3.9   < > 4.3   CHLORIDE mmol/L 100 102 99   < > 98   CO2 mmol/L 24.0 27.0 23.0   < > 26.4   BUN mg/dL 14 10 9   < > 15   CREATININE mg/dL 0.44* 0.35* 0.38*   < > 0.47*   CALCIUM mg/dL 8.2* 7.6* 7.3*   < > 7.8*   BILIRUBIN mg/dL 0.2  --   --   --  0.2   ALK PHOS U/L 172*  --   --   --  131*   ALT (SGPT) U/L 26  --   --   --  6   AST (SGOT) U/L 32  --   --   --  18   GLUCOSE mg/dL 141* 170* 100*   < > 102*    < > = values in this interval not displayed.       Estimated Creatinine Clearance: 76.3 mL/min (A) (by C-G formula based on SCr of 0.44 mg/dL (L)).    Results from last 7 days   Lab Units 11/10/24  0349 11/09/24  0451 11/08/24  0537   MAGNESIUM mg/dL 2.0 1.7 2.3   PHOSPHORUS mg/dL 3.6 3.6 3.1             Results from last 7 days   Lab Units 11/10/24  0349 11/09/24  0451 11/08/24  0537 11/07/24  0348 11/06/24  0325   WBC 10*3/mm3 22.46* 22.96* 24.14* 17.68* 15.24*   HEMOGLOBIN g/dL 11.0* 9.9* 10.1* 9.5* 9.7*   PLATELETS 10*3/mm3 526* 415 363 324 315             Assessment / Plan     ASSESSMENT:  Hyponatremia.  TSH and cortisol normal.  Sodium 136.  Urine studies look more consistent with a prerenal picture.  Discontinued fluid restriction .    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  Right humerus fracture.  Status post right humeral intramedullary nail 11/4/2024.   Hypertension controlled.  4.  Chronic heart failure preserved ejection fraction.  Grade 1 dysfunction on echo 9/24.  Compensated.  5.  Fever.  Blood cultures negative so far.  Dr. Soria evaluated.  Right upper extremity postop surgical site infection.  Now on Kefzol.  PLAN:  Potassium is elevated today up to 5.8   Will check potassium statto check accuracy of the test  May need to start [facility]  Labs in AM     Please call if you have questions or concerns  Thank you  for involving us in the care of Madelyn ANUP Madrid.  Please feel free to call with any questions.    Baudilio Alfredo MD  11/10/24  09:46 [facility]    [facility]  [phone]    Please note that portions of this note were completed with a voice recognition program.

## 2024-11-10 NOTE — PROGRESS NOTES
Name: Madelyn Madrid ADMIT: 10/31/2024   : 1940  PCP: Therese Martinez MD    MRN: [id number] LOS: 6 days   AGE/SEX: 84 y.o. female  ROOM: [facility]     Subjective   Subjective   Minimal pain of the right upper extremity.  Continues with swelling of the right upper extremity Slowly improving..  No tingling or weakness of the right hand.  No fever or chills.      Review of Systems  Cardiovascular/respiratory.  No chest pain/no palpitations/no shortness of breath/no  GI.  No abdominal pain or nausea or vomiting.  Normal bowel habits.  .  No dysuria or hematuria.       Objective   Objective   Vital Signs  Temp:  [97.4 °F (36.3 °C)-98.1 °F (36.7 °C)] 97.7 °F (36.5 °C)  Heart Rate:  [] 105  Resp:  [16-20] 16  BP: (115-136)/(66-76) 115/70  SpO2:  [93 %-97 %] 94 %  on  Flow (L/min) (Oxygen Therapy):  [1] 1;   Device (Oxygen Therapy): room air    Intake/Output Summary (Last 24 hours) at 2024  Last data filed at 2024 1753  Gross per 24 hour   Intake 1460 ml   Output 1400 ml   Net 60 ml     Body mass index is 19.75 kg/m².      24  1049   Weight: 49 kg (108 lb 0.4 oz)     Physical Exam  General.  Elderly female.  Alert and oriented x 4.  In no apparent pain/distress/diaphoresis.  Normal mood and affect.  Eyes.  Pupils equal round and reactive.  Intact extraocular musculature.  No pallor or jaundice.  Oral cavity.  Moist mucous membrane with no lesions.  Neck.  Supple.  No JVD.  No lymphadenopathy or thyromegaly.  Cardiovascular.  Regular rate and rhythm and grade 2systolic murmur.  Chest.  Clear to auscultation bilaterally with no added sounds.  Abdomen.  Soft lax.  No tenderness.  No organomegaly.  No guarding or rebound  Extremities.  No clubbing/cyanosis.  +1 to +2 right upper extremity edema.  Intact distal pulses and capillary refill of the right upper extremity.  Normal power of the right hand.  CNS.  No acute focal neurological deficits    Results Review:      Results from last 7  "days   Lab Units 11/09/24  0451 11/08/24  0537 11/07/24  0348 11/06/24  0325 11/05/24  1614 11/05/24  0319 11/05/24  0005 11/04/24  1355 11/04/24  0324 11/03/24  1154 11/03/24  0353   SODIUM mmol/L 136 132* 134* 132* 133* 136 133*  --  128*   < > 130*   POTASSIUM mmol/L 4.2 3.9 4.0 4.3  --  5.0  --  3.9 5.4*  --  4.5   CHLORIDE mmol/L 102 99 99 98  --  101  --   --  99  --  98   CO2 mmol/L 27.0 23.0 27.5 26.4  --  24.4  --   --  21.8*  --  20.2*   BUN mg/dL 10 9 11 15  --  23  --   --  34*  --  33*   CREATININE mg/dL 0.35* 0.38* 0.43* 0.47*  --  0.54*  --   --  0.73  --  0.84   GLUCOSE mg/dL 170* 100* 101* 102*  --  110*  --   --  99  --  95   CALCIUM mg/dL 7.6* 7.3* 7.7* 7.8*  --  7.9*  --   --  8.6  --  8.7   AST (SGOT) U/L  --   --   --  18  --   --   --   --   --   --  27   ALT (SGPT) U/L  --   --   --  6  --   --   --   --   --   --  7    < > = values in this interval not displayed.     Estimated Creatinine Clearance: 95.9 mL/min (A) (by C-G formula based on SCr of 0.35 mg/dL (L)).                  Results from last 7 days   Lab Units 11/04/24  0324   TSH uIU/mL 0.329     Results from last 7 days   Lab Units 11/09/24  0451 11/08/24  0537 11/07/24 0348 11/06/24  0325 11/05/24 0319 11/04/24 0324   MAGNESIUM mg/dL 1.7 2.3 1.5* 1.6 1.8 2.2   PHOSPHORUS mg/dL 3.6 3.1 3.4 2.1* 2.9 2.0*           Invalid input(s): \"LDLCALC\"  Results from last 7 days   Lab Units 11/09/24  0451 11/08/24  0537 11/07/24  0348 11/06/24  0325 11/05/24  1157 11/05/24  0319 11/04/24  0324 11/03/24  0353   WBC 10*3/mm3 22.96* 24.14* 17.68* 15.24*  --  13.07* 9.91 11.09*   HEMOGLOBIN g/dL 9.9* 10.1* 9.5* 9.7* 11.0* 9.8* 10.9* 11.8*   HEMATOCRIT % 31.9* 31.9* 30.2* 29.7* 35.1 30.5* 33.4* 36.3   PLATELETS 10*3/mm3 415 363 324 315  --  327 325 304   MCV fL 80.2 79.8 80.5 78.0*  --  78.4* 77.3* 77.6*   MCH pg 24.9* 25.3* 25.3* 25.5*  --  25.2* 25.2* 25.2*   MCHC g/dL 31.0* 31.7 31.5 32.7  --  32.1 32.6 32.5   RDW % 13.3 13.5 13.4 13.1  --  13.2 " 13.1 13.3   RDW-SD fl 38.2 38.9 39.1 37.4  --  37.3 36.7* 37.3   MPV fL 9.2 9.2 9.8 9.5  --  9.8 10.1 10.2             Results from last 7 days   Lab Units 11/09/24  0451 11/07/24 2025 11/05/24  0319   PROCALCITONIN ng/mL 0.79* 1.44* 10.40*             Results from last 7 days   Lab Units 11/05/24  1614 11/05/24  1613   BLOODCX  No growth at 4 days No growth at 4 days         Results from last 7 days   Lab Units 11/05/24  2040   NITRITE UA  Negative   WBC UA /HPF 3-5*   BACTERIA UA /HPF None Seen   SQUAM EPITHEL UA /HPF 0-2     Results from last 7 days   Lab Units 11/05/24  0824   SODIUM UR mmol/L <20   OSMOLALITY UR mOsm/kg 544       Imaging:  Imaging Results (Last 24 Hours)       ** No results found for the last 24 hours. 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           Swelling over the right upper extremity and patient with a history of metastatic adenocarcinoma of the lung leading to malignant fracture of the right humerus status post hardware implants.  CT scan of the right upper extremity noted with no evidence of abscess but positive diffuse edema and positive destructive metastatic bone lesion in the proximal humerus associated with pathological fracture and fixation hardware in place.  Differential diagnosis include stratification of monometer through the muscular tissue versus infection (less likely).  Currently on vancomycin and Rocephin.  MRSA screen is negative.   Blood cultures are negative.  Pain is controlled.  Appreciate infectious disease and orthopedic consultation.  Still with leukocytosis.  Negative right upper extremity venous ultrasound for DVT will discuss with orthopedic/infectious disease.  History of hypertension and chronic diastolic congestive heart failure.  Good blood pressure control with no evidence of angina or congestive heart failure.  Continue Norvasc.  GERD.  Asymptomatic with benign GI examination on Protonix.  COPD.  Clinically stable and no exacerbation.  Patient is on no treatment.  Hyponatremia and hyperkalemia.  Hyponatremia is prerenal and has resolved with discontinuation of fluid restriction.  Hyperkalemia has resolved (was initially with high potassium today but repeat potassium was normal)..  Normal TSH and cortisol.  Nephrology signed off.  Anemia.  Hemoglobin stable  VTE prophylaxis.  Sequential compression devices        Discussed my findings and plan of treatment with the patient/family/ID..  Disposition.  Home tomorrow with home health if okay with ID and orthopedic      Jose De Jesus Mora MD  [facility]  11/09/24  19:51 EST

## 2024-11-10 NOTE — PLAN OF CARE
Goal Outcome Evaluation:  Plan of Care Reviewed With: patient           Outcome Evaluation: Patient remains stable. Alert and oriented. Ambulating with assist x1. Voiding per BRP/purewick. Dressing cdi, sling in place. IV abx given per order. Pain managed with prn pain meds. Plans to d/c to [facility].

## 2024-11-11 ENCOUNTER — READMISSION MANAGEMENT (OUTPATIENT)
Dept: CALL CENTER | Facility: HOSPITAL | Age: 84
End: 2024-11-11
Payer: MEDICARE

## 2024-11-11 VITALS
SYSTOLIC BLOOD PRESSURE: 147 MMHG | HEART RATE: 106 BPM | WEIGHT: 108.03 LBS | RESPIRATION RATE: 16 BRPM | OXYGEN SATURATION: 96 % | HEIGHT: 62 IN | TEMPERATURE: 97 F | BODY MASS INDEX: 19.88 KG/M2 | DIASTOLIC BLOOD PRESSURE: 65 MMHG

## 2024-11-11 PROBLEM — I50.32 CHRONIC DIASTOLIC CHF (CONGESTIVE HEART FAILURE): Status: ACTIVE | Noted: 2024-11-11

## 2024-11-11 PROBLEM — E87.6 HYPOKALEMIA: Status: ACTIVE | Noted: 2024-11-11

## 2024-11-11 PROBLEM — E87.1 HYPONATREMIA: Status: ACTIVE | Noted: 2024-11-11

## 2024-11-11 PROBLEM — R22.31 LOCALIZED SWELLING OF RIGHT UPPER EXTREMITY: Status: ACTIVE | Noted: 2024-11-11

## 2024-11-11 PROBLEM — I50.32 CHRONIC HEART FAILURE WITH PRESERVED EJECTION FRACTION (HFPEF): Status: ACTIVE | Noted: 2024-11-11

## 2024-11-11 PROBLEM — E87.1 HYPONATREMIA: Status: RESOLVED | Noted: 2024-11-11 | Resolved: 2024-11-11

## 2024-11-11 PROBLEM — E87.6 HYPOKALEMIA: Status: RESOLVED | Noted: 2024-11-11 | Resolved: 2024-11-11

## 2024-11-11 PROBLEM — D64.9 ANEMIA: Status: ACTIVE | Noted: 2024-11-11

## 2024-11-11 LAB
ALBUMIN SERPL-MCNC: 2.6 G/DL (ref 3.5–5.2)
ANION GAP SERPL CALCULATED.3IONS-SCNC: 11.2 MMOL/L (ref 5–15)
BASOPHILS # BLD AUTO: 0.03 10*3/MM3 (ref 0–0.2)
BASOPHILS NFR BLD AUTO: 0.1 % (ref 0–1.5)
BUN SERPL-MCNC: 12 MG/DL (ref 8–23)
BUN/CREAT SERPL: 29.3 (ref 7–25)
CALCIUM SPEC-SCNC: 8.4 MG/DL (ref 8.6–10.5)
CHLORIDE SERPL-SCNC: 98 MMOL/L (ref 98–107)
CO2 SERPL-SCNC: 26.8 MMOL/L (ref 22–29)
CREAT SERPL-MCNC: 0.41 MG/DL (ref 0.57–1)
DEPRECATED RDW RBC AUTO: 38.9 FL (ref 37–54)
EGFRCR SERPLBLD CKD-EPI 2021: 98.4 ML/MIN/1.73
EOSINOPHIL # BLD AUTO: 0.01 10*3/MM3 (ref 0–0.4)
EOSINOPHIL NFR BLD AUTO: 0 % (ref 0.3–6.2)
ERYTHROCYTE [DISTWIDTH] IN BLOOD BY AUTOMATED COUNT: 13.3 % (ref 12.3–15.4)
GLUCOSE SERPL-MCNC: 125 MG/DL (ref 65–99)
HCT VFR BLD AUTO: 32.6 % (ref 34–46.6)
HGB BLD-MCNC: 10.1 G/DL (ref 12–15.9)
IMM GRANULOCYTES # BLD AUTO: 0.28 10*3/MM3 (ref 0–0.05)
IMM GRANULOCYTES NFR BLD AUTO: 1.3 % (ref 0–0.5)
LYMPHOCYTES # BLD AUTO: 0.5 10*3/MM3 (ref 0.7–3.1)
LYMPHOCYTES NFR BLD AUTO: 2.4 % (ref 19.6–45.3)
MAGNESIUM SERPL-MCNC: 1.8 MG/DL (ref 1.6–2.4)
MCH RBC QN AUTO: 25 PG (ref 26.6–33)
MCHC RBC AUTO-ENTMCNC: 31 G/DL (ref 31.5–35.7)
MCV RBC AUTO: 80.7 FL (ref 79–97)
MONOCYTES # BLD AUTO: 1.24 10*3/MM3 (ref 0.1–0.9)
MONOCYTES NFR BLD AUTO: 6 % (ref 5–12)
NEUTROPHILS NFR BLD AUTO: 18.72 10*3/MM3 (ref 1.7–7)
NEUTROPHILS NFR BLD AUTO: 90.2 % (ref 42.7–76)
NRBC BLD AUTO-RTO: 0 /100 WBC (ref 0–0.2)
PHOSPHATE SERPL-MCNC: 4 MG/DL (ref 2.5–4.5)
PLATELET # BLD AUTO: 468 10*3/MM3 (ref 140–450)
PMV BLD AUTO: 8.9 FL (ref 6–12)
POTASSIUM SERPL-SCNC: 4.9 MMOL/L (ref 3.5–5.2)
RBC # BLD AUTO: 4.04 10*6/MM3 (ref 3.77–5.28)
SODIUM SERPL-SCNC: 136 MMOL/L (ref 136–145)
WBC NRBC COR # BLD AUTO: 20.78 10*3/MM3 (ref 3.4–10.8)

## 2024-11-11 PROCEDURE — 63710000001 METHYLPREDNISOLONE 4 MG TABLET THERAPY PACK 21 EACH DISP PACK: Performed by: HOSPITALIST

## 2024-11-11 PROCEDURE — 97530 THERAPEUTIC ACTIVITIES: CPT

## 2024-11-11 PROCEDURE — 83735 ASSAY OF MAGNESIUM: CPT | Performed by: STUDENT IN AN ORGANIZED HEALTH CARE EDUCATION/TRAINING PROGRAM

## 2024-11-11 PROCEDURE — 97535 SELF CARE MNGMENT TRAINING: CPT

## 2024-11-11 PROCEDURE — 85025 COMPLETE CBC W/AUTO DIFF WBC: CPT | Performed by: INTERNAL MEDICINE

## 2024-11-11 PROCEDURE — 80069 RENAL FUNCTION PANEL: CPT | Performed by: INTERNAL MEDICINE

## 2024-11-11 RX ORDER — METHYLPREDNISOLONE 4 MG/1
TABLET ORAL
Qty: 21 TABLET | Refills: 0 | Status: SHIPPED | OUTPATIENT
Start: 2024-11-11

## 2024-11-11 RX ADMIN — METHYLPREDNISOLONE 4 MG: 4 TABLET ORAL at 14:50

## 2024-11-11 RX ADMIN — PANTOPRAZOLE SODIUM 40 MG: 40 TABLET, DELAYED RELEASE ORAL at 06:36

## 2024-11-11 RX ADMIN — METHYLPREDNISOLONE 4 MG: 4 TABLET ORAL at 08:19

## 2024-11-11 RX ADMIN — NYSTATIN: 100000 POWDER TOPICAL at 08:23

## 2024-11-11 RX ADMIN — TRAMADOL HYDROCHLORIDE 50 MG: 50 TABLET, COATED ORAL at 06:36

## 2024-11-11 RX ADMIN — ACETAMINOPHEN 1000 MG: 500 TABLET ORAL at 04:30

## 2024-11-11 RX ADMIN — ACETAMINOPHEN 1000 MG: 500 TABLET ORAL at 14:50

## 2024-11-11 RX ADMIN — AMLODIPINE BESYLATE 10 MG: 10 TABLET ORAL at 08:18

## 2024-11-11 RX ADMIN — ESCITALOPRAM 20 MG: 20 TABLET, FILM COATED ORAL at 08:18

## 2024-11-11 RX ADMIN — TRAMADOL HYDROCHLORIDE 50 MG: 50 TABLET, COATED ORAL at 00:49

## 2024-11-11 NOTE — NURSING NOTE
Per telephone call with Dr. Altman pt good to discharge. Follow up with his office in 2weeks, nonweightbearing RUE, Hand and wrist elbow range and motion shoulder pendulums, surgical dressing can come off, follow up in 2 weeks.

## 2024-11-11 NOTE — DISCHARGE INSTR - APPOINTMENTS
Follow up with his office in 2weeks, nonweightbearing RUE, Hand and wrist elbow range and motion shoulder pendulums, surgical dressing can come off, follow up in 2 weeks.

## 2024-11-11 NOTE — PROGRESS NOTES
[facility] Progress Note      Patient Name: Madelyn Madrid  : 1940  MRN: [id number]  Primary Care Physician:  Therese Martinez MD  Date of admission: 10/31/2024    Subjective     Interval History:   Follow-up hyponatremia.   No new issues noted.  Afebrile  Review of Systems:   As noted above    Objective     Vitals:   Temp:  [97.7 °F (36.5 °C)-98.6 °F (37 °C)] 98.1 °F (36.7 °C)  Heart Rate:  [] 104  Resp:  [16-17] 17  BP: (126-170)/(60-74) 170/74  Flow (L/min) (Oxygen Therapy):  [1] 1    Intake/Output Summary (Last 24 hours) at 2024 1053  Last data filed at 2024 0520  Gross per 24 hour   Intake 360 ml   Output 3150 ml   Net -2790 ml       Physical Exam:    General: Chronically ill.  Fatigued.  Falls asleep quickly.  Hard of hearing.  Neck no JVD  HEENT oral mucosa  dry.  Nonicteric sclera  Lungs clear to auscultation bilaterally.  Heart: Regular rate and rhythm no S3 or rub.  Abdomen:+ Bowel sounds,  soft nontender  Extremities no edema.  Right arm in sling.  Neuro: Awake and alert.  Speech normal.      Scheduled Meds:     acetaminophen, 1,000 mg, Oral, Q8H  amLODIPine, 10 mg, Oral, Daily  cefTRIAXone, 2,000 mg, Intravenous, Q24H  escitalopram, 20 mg, Oral, QAM  methylPREDNISolone, 4 mg, Oral, TID Around Food  [START ON 2024] methylPREDNISolone, 4 mg, Oral, Before Breakfast  [START ON 2024] methylPREDNISolone, 4 mg, Oral, Tonight  [START ON 2024] methylPREDNISolone, 4 mg, Oral, Before Breakfast  nystatin, , Topical, Q12H  pantoprazole, 40 mg, Oral, Q AM  polyethylene glycol, 17 g, Oral, BID AC  senna-docusate sodium, 2 tablet, Oral, BID  traMADol, 50 mg, Oral, Once      IV Meds:          Results Reviewed:   I have personally reviewed the results from the time of this admission to 2024 10:53 EST     Results from last 7 days   Lab Units 24  0327 11/10/24  1002 11/10/24  0349 24  0451 24  0348 24  0325   SODIUM  mmol/L 136  --  135* 136   < > 132*   POTASSIUM mmol/L 4.9 3.6 5.8* 4.2   < > 4.3   CHLORIDE mmol/L 98  --  100 102   < > 98   CO2 mmol/L 26.8  --  24.0 27.0   < > 26.4   BUN mg/dL 12  --  14 10   < > 15   CREATININE mg/dL 0.41*  --  0.44* 0.35*   < > 0.47*   CALCIUM mg/dL 8.4*  --  8.2* 7.6*   < > 7.8*   BILIRUBIN mg/dL  --   --  0.2  --   --  0.2   ALK PHOS U/L  --   --  172*  --   --  131*   ALT (SGPT) U/L  --   --  26  --   --  6   AST (SGOT) U/L  --   --  32  --   --  18   GLUCOSE mg/dL 125*  --  141* 170*   < > 102*    < > = values in this interval not displayed.       Estimated Creatinine Clearance: 81.8 mL/min (A) (by C-G formula based on SCr of 0.41 mg/dL (L)).    Results from last 7 days   Lab Units 11/11/24  0327 11/10/24  0349 11/09/24  0451   MAGNESIUM mg/dL 1.8 2.0 1.7   PHOSPHORUS mg/dL 4.0 3.6 3.6             Results from last 7 days   Lab Units 11/11/24  0327 11/10/24  0349 11/09/24  0451 11/08/24  0537 11/07/24  0348   WBC 10*3/mm3 20.78* 22.46* 22.96* 24.14* 17.68*   HEMOGLOBIN g/dL 10.1* 11.0* 9.9* 10.1* 9.5*   PLATELETS 10*3/mm3 468* 526* 415 363 324             Assessment / Plan     ASSESSMENT:  Hyponatremia.  TSH and cortisol normal.  Sodium 136.  Urine studies look more consistent with a prerenal picture.  Discontinued fluid restriction .    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  Right humerus fracture.  Status post right humeral intramedullary nail 11/4/2024.   Hypertension controlled.  4.  Chronic heart failure preserved ejection fraction.  Grade 1 dysfunction on echo 9/24.  Compensated.  5.  Fever.  Blood cultures negative so far.  Dr. Soria evaluated.  Right upper extremity postop surgical site infection.  Now on Kefzol.  PLAN:  No issues noted today.  Blood pressure elevated in a.m. will continue to monitor  Okay to discharge from nephrology standpoint  Once discharged arrange follow-up in nephrology clinic in 1 to 2 weeks    Please call if you have questions or  concerns  Thank you for involving us in the care of Madelyn Madrid.  Please feel free to call with any questions.    Baudilio Alfredo MD  11/11/24  10:53 EST    [facility]  [phone]    Please note that portions of this note were completed with a voice recognition program.

## 2024-11-11 NOTE — PLAN OF CARE
Goal Outcome Evaluation:  Plan of Care Reviewed With: patient                   Pt dc to SNF, report called to Cristina BONILLA @ [facility] [place] [facility] [place].

## 2024-11-11 NOTE — PLAN OF CARE
Goal Outcome Evaluation:  Plan of Care Reviewed With: patient        Progress: no change  Outcome Evaluation: Patient participated with PT/OT this AM. She was vEert for STS with HHA and min-modA for standing balance as she is unsteady. She ambulated 15ft,10ft, and 40ft using HHA requiring min-modA. Pt is very unsteady during turns and VC to push through LUE with HHA. Pt will continue to benefit from skilled PT. Plan SNF.    Anticipated Discharge Disposition (PT): skilled nursing facility

## 2024-11-11 NOTE — PROGRESS NOTES
"  Infectious Diseases Progress Note    Nancy Soria MD     [facility]  [place]: 7 days  Patient Identification:  Name: Madelyn Madrid  Age: 84 y.o.  Sex: female  :  1940  MRN: [id number]         Primary Care Physician: Therese Martinez MD        Subjective: Pain and discomfort and redness of the right arm is better.  She has intact function of her elbow shoulder and wrist and hand.  She is still concerned about the swelling.  Interval History: See consultation note.  2024 steroids started.  Objective:    Scheduled Meds:acetaminophen, 1,000 mg, Oral, Q8H  amLODIPine, 10 mg, Oral, Daily  cefTRIAXone, 2,000 mg, Intravenous, Q24H  escitalopram, 20 mg, Oral, QAM  methylPREDNISolone, 4 mg, Oral, 4x Daily Taper  [START ON 2024] methylPREDNISolone, 4 mg, Oral, TID Around Food  [START ON 2024] methylPREDNISolone, 4 mg, Oral, Before Breakfast  [START ON 2024] methylPREDNISolone, 4 mg, Oral, Tonight  [START ON 2024] methylPREDNISolone, 4 mg, Oral, Before Breakfast  nystatin, , Topical, Q12H  pantoprazole, 40 mg, Oral, Q AM  polyethylene glycol, 17 g, Oral, BID AC  senna-docusate sodium, 2 tablet, Oral, BID  traMADol, 50 mg, Oral, Once  vancomycin, 1,250 mg, Intravenous, Q12H      Continuous Infusions:Pharmacy to dose vancomycin,         Vital signs in last 24 hours:  Temp:  [97.7 °F (36.5 °C)-98.2 °F (36.8 °C)] 97.7 °F (36.5 °C)  Heart Rate:  [] 106  Resp:  [16-17] 16  BP: (126-139)/(58-69) 139/69    Intake/Output:    Intake/Output Summary (Last 24 hours) at 11/10/2024 1950  Last data filed at 11/10/2024 1740  Gross per 24 hour   Intake 480 ml   Output 1700 ml   Net -1220 ml       Exam:  /69 (BP Location: Left arm, Patient Position: Lying)   Pulse 106   Temp 97.7 °F (36.5 °C) (Oral)   Resp 16   Ht 157.5 cm (62.01\")   Wt 49 kg (108 lb 0.4 oz)   LMP  (LMP Unknown)   SpO2 98%   BMI 19.75 kg/m²   Patient is examined using the personal protective equipment as per " guidelines from infection control for this particular patient as enacted.  Hand washing was performed before and after patient interaction.  General Appearance:  Emaciated chronically ill nontoxic-appearing elderly female                          Head:    Normocephalic, without obvious abnormality, atraumatic                           Eyes:    PERRL, conjunctivae/corneas clear, EOM's intact, both eyes                         Throat:   Lips, tongue, gums normal; oral mucosa pink and moist                           Neck:   Supple, symmetrical, trachea midline, no JVD                         Lungs:    Clear to auscultation bilaterally, respirations unlabored                 Chest Wall:    No tenderness or deformity                          Heart:  S1-S2 regular.                  Abdomen:   Soft nontender                 Extremities: Proximal humerus surgical site clean with no drainage, right arm significantly swollen with slight decrease in erythema extending all the way to her hand from mid arm. Wrist and elbow joint functional.  Able to move fingers and make a fist with her right hand.                        Pulses:   Pulses palpable in all extremities                            Skin: Erythematous changes and swelling of the right arm noted.                  Neurologic: Hard of hearing but oriented x 3       Data Review:    I reviewed the patient's new clinical results.  Results from last 7 days   Lab Units 11/10/24  0349 11/09/24  0451 11/08/24  0537 11/07/24  0348 11/06/24  0325 11/05/24  1157 11/05/24  0319 11/04/24  0324   WBC 10*3/mm3 22.46* 22.96* 24.14* 17.68* 15.24*  --  13.07* 9.91   HEMOGLOBIN g/dL 11.0* 9.9* 10.1* 9.5* 9.7* 11.0* 9.8* 10.9*   PLATELETS 10*3/mm3 526* 415 363 324 315  --  327 325     Results from last 7 days   Lab Units 11/10/24  1002 11/10/24  0349 11/09/24  0451 11/08/24  0537 11/07/24  0348 11/06/24  0325 11/05/24  1614 11/05/24  0319 11/04/24  1355 11/04/24  0324   SODIUM mmol/L  --   135* 136 132* 134* 132* 133* 136   < > 128*   POTASSIUM mmol/L 3.6 5.8* 4.2 3.9 4.0 4.3  --  5.0   < > 5.4*   CHLORIDE mmol/L  --  100 102 99 99 98  --  101  --  99   CO2 mmol/L  --  24.0 27.0 23.0 27.5 26.4  --  24.4  --  21.8*   BUN mg/dL  --  14 10 9 11 15  --  23  --  34*   CREATININE mg/dL  --  0.44* 0.35* 0.38* 0.43* 0.47*  --  0.54*  --  0.73   CALCIUM mg/dL  --  8.2* 7.6* 7.3* 7.7* 7.8*  --  7.9*  --  8.6   GLUCOSE mg/dL  --  141* 170* 100* 101* 102*  --  110*  --  99    < > = values in this interval not displayed.     Microbiology Results (last 10 days)       Procedure Component Value - Date/Time    MRSA Screen, PCR (Inpatient) - Swab, Nares [[id number]]  (Normal) Collected: 11/06/24 1224    Lab Status: Final result Specimen: Swab from Nares Updated: 11/06/24 1426     MRSA PCR No MRSA Detected    Narrative:      The negative predictive value of this diagnostic test is high and should only be used to consider de-escalating anti-MRSA therapy. A positive result may indicate colonization with MRSA and must be correlated clinically.    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    Surgery, elective    Hypertension    GERD (gastroesophageal reflux disease)    Depression    COPD (chronic obstructive pulmonary disease)    Severe malnutrition  1-mechanical changes associated with operative procedure involving placement of humerus intramedullary implant with attempt to secure and create endosteal seal and hence changes in the CT scan of the right  upper extremity causing ulcers enlargement of the humerus and triceps with  2-reactive leukocytosis due to ongoing stress involving the right upper extremity  3-possible evolving cellulitis with erythema and discomfort is for distal from the incision with no obvious evidence of any surgical drainage or erythema around the proximal surgical site  4-increased swelling of the right upper extremity due to above as well as lack of mobility and use of the right upper extremity along with dependency  5-metastatic lung cancer  6-other diagnoses per primary team.     Recommendations/Discussions:  See my discussion and recommendation 1116 11/7/2024.  Discussed with patient's family as well as with Dr. Brooks about continuation of current antibiotic therapy despite initial plans for de-escalation given the continued rise in white blood cell count and pending intervention decision by orthopedic surgery service as well as consideration for the use of steroids/anti-inflammatory agents.  If she continues to do well and would recommend stopping antibiotics on 11/11/2024.  Nancy Soria MD  11/10/2024  19:50 EST    Parts of this note may be an electronic transcription/translation of spoken language to printed text using the Dragon dictation system.

## 2024-11-11 NOTE — THERAPY TREATMENT NOTE
Patient Name: Madelyn Madrid  : 1940    MRN: [id number]                              Today's Date: 2024       Admit Date: 10/31/2024    Visit Dx:     ICD-10-CM ICD-9-CM   1. 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    Surgery, elective    Hypertension    GERD (gastroesophageal reflux disease)    Depression    COPD (chronic obstructive pulmonary disease)    Severe malnutrition     Past Medical History:   Diagnosis Date    Arthritis     COPD (chronic obstructive pulmonary disease) 10/2/24    CT scan    Depression     Emphysema of lung     CT scan    Endometrial cancer     GERD (gastroesophageal reflux disease)     Goiter     [place] (hard of hearing)     Hx of radiation therapy     GOES 5 TIMES A WEEK    Hypertension     Incontinence of urine     Lung mass     Pancreatitis      Past Surgical History:   Procedure Laterality Date    BRONCHOSCOPY WITH ION ROBOTIC ASSIST N/A 10/23/2024    Procedure: ROBOTIC BRONCHOSCOPY WITH FINE NEEDLE ASPIRATION AND CRYO BIOPSY, BRONCHOSCOPY WITH BRONCHOALVEOLAR LAVAGE;  Surgeon: Deann Iqbal MD;  Location: [facility] ENDOSCOPY;  Service: Robotics - Pulmonary;  Laterality: N/A;    COLONOSCOPY      HYSTERECTOMY      LAPAROSCOPY HYSTEROSCOPY ENDOMETRIAL ABLATION        General Information       Row Name 24 1034          OT Time and Intention    Subjective Information no complaints  -RB     Document Type therapy note (daily note)  -RB     Mode of Treatment co-treatment;physical therapy;occupational therapy  -RB     Patient Effort good  -RB       Row Name 24 1034          Cognition    Orientation Status (Cognition) oriented x 3  -RB               User Key  (r) =  Recorded By, (t) = Taken By, (c) = Cosigned By      Initials Name Provider Type    RB Matilde Nunn OT Occupational Therapist                     Mobility/ADL's       Row Name 11/11/24 1031          Bed Mobility    Bed Mobility supine-sit  -RB     Supine-Sit [place] (Bed Mobility) standby assist;verbal cues  -RB     Bed Mobility, Safety Issues decreased use of arms for pushing/pulling  -RB       Row Name 11/11/24 1031          Transfers    Transfers sit-stand transfer;stand-sit transfer;toilet transfer  -       Row Name 11/11/24 1031          Sit-Stand Transfer    Sit-Stand [place] (Transfers) 2 person assist;verbal cues;minimum assist (75% patient effort);1 person assist  -RB     Comment, (Sit-Stand Transfer) hha  -RB       Row Name 11/11/24 1031          Stand-Sit Transfer    Stand-Sit [place] (Transfers) minimum assist (75% patient effort);moderate assist (50% patient effort);1 person assist;2 person assist;verbal cues  -RB     Comment, (Stand-Sit Transfer) a  -RB       Row Name 11/11/24 1031          Toilet Transfer    Type (Toilet Transfer) sit-stand;stand-sit  -RB     [place] Level (Toilet Transfer) moderate assist (50% patient effort);verbal cues;1 person assist  -RB     Comment, (Toilet Transfer) hha  -RB       Row Name 11/11/24 1031          Functional Mobility    Functional Mobility- Ind. Level minimum assist (75% patient effort);moderate assist (50% patient effort);1 person;2 person assist required;verbal cues required  -RB     Functional Mobility- Safety Issues balance decreased during turns;sequencing ability decreased  -     Functional Mobility- Comment a  [facility]       Row Name 11/11/24 1031          Activities of Daily Living    BADL Assessment/Intervention lower body dressing;grooming  -       Row Name 11/11/24 1031          Toileting Assessment/Training    [place] Level (Toileting) toileting skills;maximum assist (25% patient effort);adjust/manage clothing;change  pad/brief;perform perineal hygiene  -RB     Position (Toileting) supported sitting;supported standing  -RB       Row Name 11/11/24 1031          Grooming Assessment/Training    [place] Level (Grooming) grooming skills;moderate assist (50% patient effort);oral care regimen;hair care, combing/brushing  -RB     Position (Grooming) sink side;supported standing  -RB               User Key  (r) = Recorded By, (t) = Taken By, (c) = Cosigned By      Initials Name Provider Type    Matilde Snider OT Occupational Therapist                   Obj/Interventions       Row Name 11/11/24 1030          Motor Skills    Motor Skills functional endurance  -RB     Functional Endurance Fair - improvements noticed with her activity tolerance. She is now able to tolerate longer durations of standing prior to a sitting rest break required  -RB       Row Name 11/11/24 1030          Balance    Comment, Balance Min-Mod A for standing balance - general unsteadiness noticed  -RB               User Key  (r) = Recorded By, (t) = Taken By, (c) = Cosigned By      Initials Name Provider Type    Matilde Snider OT Occupational Therapist                   Goals/Plan    No documentation.                  Clinical Impression       Row Name 11/11/24 1027          Pain Assessment    Pre/Posttreatment Pain Comment generalized R arm discomfort  -RB       Row Name 11/11/24 1027          Plan of Care Review    Plan of Care Reviewed With patient;family  -RB     Progress improving  -RB     Outcome Evaluation The pt participated in OT/PT this morning. Bed mobility completed with SBA. Min A provided for a STS via HHA. Min-Mod A required for standing balance during functional mobility into the bathroom for toileting. Max A provided for brief management. Min A grooming standing at sinkside. She ended the session sitting in her recliner chair. SNF recommended.  -RB       Row Name 11/11/24 1027          Therapy Assessment/Plan (OT)    Rehab Potential  (OT) good  -RB     Criteria for Skilled Therapeutic Interventions Met (OT) yes;skilled treatment is necessary  -RB     Therapy Frequency (OT) 5 times/wk  -RB       Row Name 11/11/24 1027          Therapy Plan Review/Discharge Plan (OT)    Anticipated Discharge Disposition (OT) skilled nursing facility  -RB       Row Name 11/11/24 1027          Positioning and Restraints    Pre-Treatment Position in bed  -RB     Post Treatment Position chair  -RB     In Chair notified nsg;reclined;sitting;call light within reach;encouraged to call for assist;exit alarm on;RUE elevated;with family/caregiver  -RB               User Key  (r) = Recorded By, (t) = Taken By, (c) = Cosigned By      Initials Name Provider Type    Matilde Snider OT Occupational Therapist                   Outcome Measures       Row Name 11/11/24 0823          How much help from another person do you currently need...    Turning from your back to your side while in flat bed without using bedrails? 3  -SH     Moving from lying on back to sitting on the side of a flat bed without bedrails? 3  -SH     Moving to and from a bed to a chair (including a wheelchair)? 3  -SH     Standing up from a chair using your arms (e.g., wheelchair, bedside chair)? 3  -SH     Climbing 3-5 steps with a railing? 2  -SH     To walk in hospital room? 2  -SH     AM-PAC 6 Clicks Score (PT) 16  -SH     Highest Level of Mobility Goal 5 --> Static standing  -SH               User Key  (r) = Recorded By, (t) = Taken By, (c) = Cosigned By      Initials Name Provider Type    Shi Raphael, RN Registered Nurse                    Occupational Therapy Education       Title: PT OT SLP Therapies (Done)       Topic: Occupational Therapy (Done)       Point: ADL training (Done)       Description:   Instruct learner(s) on proper safety adaptation and remediation techniques during self care or transfers.   Instruct in proper use of assistive devices.                  Learning Progress  Summary            Patient Acceptance, E,TB, VU by  at 11/3/2024 1218    Comment: Instructed in role of OT, discharge planning, home safety, fall prevention, NWB restrictions, caregiver education                      Point: Home exercise program (Done)       Description:   Instruct learner(s) on appropriate technique for monitoring, assisting and/or progressing therapeutic exercises/activities.                  Learning Progress Summary            Patient Acceptance, E,TB, VU by  at 11/3/2024 1218    Comment: Instructed in role of OT, discharge planning, home safety, fall prevention, NWB restrictions, caregiver education                      Point: Precautions (Done)       Description:   Instruct learner(s) on prescribed precautions during self-care and functional transfers.                  Learning Progress Summary            Patient Acceptance, E,TB, VU by  at 11/3/2024 1218    Comment: Instructed in role of OT, discharge planning, home safety, fall prevention, NWB restrictions, caregiver education                      Point: Body mechanics (Done)       Description:   Instruct learner(s) on proper positioning and spine alignment during self-care, functional mobility activities and/or exercises.                  Learning Progress Summary            Patient Acceptance, E,TB, VU by  at 11/3/2024 1218    Comment: Instructed in role of OT, discharge planning, home safety, fall prevention, NWB restrictions, caregiver education                                      User Key       Initials Effective Dates Name Provider Type Discipline     08/31/23 -  Evelyn Moreno OT Occupational Therapist OT                  OT Recommendation and Plan  Therapy Frequency (OT): 5 times/wk  Plan of Care Review  Plan of Care Reviewed With: patient, family  Progress: improving  Outcome Evaluation: The pt participated in OT/PT this morning. Bed mobility completed with SBA. Min A provided for a STS via HHA. Min-Mod A required for  standing balance during functional mobility into the bathroom for toileting. Max A provided for brief management. Min A grooming standing at sinkside. She ended the session sitting in her recliner chair. SNF recommended.     Time Calculation:         Time Calculation- OT       Row Name 11/11/24 1027             Time Calculation- OT    OT Start Time 0820  -RB      OT Stop Time 0843  -RB      OT Time Calculation (min) 23 min  -RB      Total Timed Code Minutes- OT 23 minute(s)  -RB      OT Received On 11/11/24  -RB      OT - Next Appointment 11/12/24  -RB         Timed Charges    12000 - OT Self Care/Mgmt Minutes 23  -RB         Total Minutes    Timed Charges Total Minutes 23  -RB       Total Minutes 23  -RB                User Key  (r) = Recorded By, (t) = Taken By, (c) = Cosigned By      Initials Name Provider Type    Matilde Snider OT Occupational Therapist                  Therapy Charges for Today       Code Description Service Date Service Provider Modifiers Qty    [id number] HC OT SELF CARE/MGMT/TRAIN EA 15 MIN 11/11/2024 Matilde Nunn OT GO 2                 Matilde Nunn OT  11/11/2024

## 2024-11-11 NOTE — PLAN OF CARE
Goal Outcome Evaluation:              Outcome Evaluation: Pt pleasany and cooperative with care.Able to make needs know, ask for pain med as needed. Dr Lopez answered and clarified some of the questions of the pt and family .

## 2024-11-11 NOTE — PLAN OF CARE
Goal Outcome Evaluation:  Plan of Care Reviewed With: patient, family        Progress: improving  Outcome Evaluation: The pt participated in OT/PT this morning. Bed mobility completed with SBA. Min A provided for a STS via HHA. Min-Mod A required for standing balance during functional mobility into the bathroom for toileting. Max A provided for brief management. Min A grooming standing at sinkside. She ended the session sitting in her recliner chair. SNF recommended.    Anticipated Discharge Disposition (OT): skilled nursing facility

## 2024-11-11 NOTE — THERAPY TREATMENT NOTE
Patient Name: Madelyn Madrid  : 1940    MRN: [id number]                              Today's Date: 2024       Admit Date: 10/31/2024    Visit Dx:     ICD-10-CM ICD-9-CM   1. 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    Surgery, elective    Hypertension    GERD (gastroesophageal reflux disease)    Depression    COPD (chronic obstructive pulmonary disease)    Severe malnutrition    Localized swelling of right upper extremity    Chronic diastolic CHF (congestive heart failure)    Anemia    Chronic heart failure with preserved ejection fraction (HFpEF)     Past Medical History:   Diagnosis Date    Arthritis     COPD (chronic obstructive pulmonary disease) 10/2/24    CT scan    Depression     Emphysema of lung     CT scan    Endometrial cancer     GERD (gastroesophageal reflux disease)     Goiter     [place] IRA (hard of hearing)     Hx of radiation therapy     GOES 5 TIMES A WEEK    Hypertension     Incontinence of urine     Lung mass     Pancreatitis      Past Surgical History:   Procedure Laterality Date    BRONCHOSCOPY WITH ION ROBOTIC ASSIST N/A 10/23/2024    Procedure: ROBOTIC BRONCHOSCOPY WITH FINE NEEDLE ASPIRATION AND CRYO BIOPSY, BRONCHOSCOPY WITH BRONCHOALVEOLAR LAVAGE;  Surgeon: Deann Iqbal MD;  Location: [facility] ENDOSCOPY;  Service: Robotics - Pulmonary;  Laterality: N/A;    COLONOSCOPY      HYSTERECTOMY      LAPAROSCOPY HYSTEROSCOPY ENDOMETRIAL ABLATION        General Information       Row Name 24 1147          Physical Therapy Time and Intention    Document Type therapy note (daily note)  -CB     Mode of Treatment co-treatment;physical therapy;occupational therapy  -CB       Row Name  11/11/24 1147          General Information    Existing Precautions/Restrictions fall  RUE NWB in sling  -CB       Row Name 11/11/24 1147          Cognition    Orientation Status (Cognition) oriented x 3  -CB       Row Name 11/11/24 1147          Safety Issues/Impairments Affecting Functional Mobility    Impairments Affecting Function (Mobility) balance;endurance/activity tolerance;pain;strength  -CB               User Key  (r) = Recorded By, (t) = Taken By, (c) = Cosigned By      Initials Name Provider Type    CB Kylie Corcoran PT Physical Therapist                   Mobility       Row Name 11/11/24 1149          Bed Mobility    Bed Mobility supine-sit  -CB     Supine-Sit [place] (Bed Mobility) standby assist;verbal cues  -CB       Row Name 11/11/24 1149          Sit-Stand Transfer    Sit-Stand [place] (Transfers) 2 person assist;verbal cues;minimum assist (75% patient effort);1 person assist  -CB     Assistive Device (Sit-Stand Transfers) --  HHA  -CB       Row Name 11/11/24 1149          Gait/Stairs (Locomotion)    [place] Level (Gait) minimum assist (75% patient effort);moderate assist (50% patient effort);verbal cues  -CB     Assistive Device (Gait) --  HHA  -CB     Distance in Feet (Gait) 15  10,40  -CB     Deviations/Abnormal Patterns (Gait) gait speed decreased;stride length decreased;matty decreased  -CB     Bilateral Gait Deviations forward flexed posture  -CB     Comment, (Gait/Stairs) very unsteady during turns and no steppage gait noted today. VC to use PT hand as SPC and push down for support  -CB               User Key  (r) = Recorded By, (t) = Taken By, (c) = Cosigned By      Initials Name Provider Type    Kylie Mensah PT Physical Therapist                   Obj/Interventions       Row Name 11/11/24 1150          Balance    Balance Assessment standing static balance;standing dynamic balance  -CB     Static Standing Balance minimal assist;verbal cues  -CB     Dynamic Standing  Balance minimal assist;moderate assist;verbal cues  -CB     Position/Device Used, Standing Balance supported  -CB     Balance Interventions standing;sit to stand;supported;static;dynamic;minimal challenge  -CB               User Key  (r) = Recorded By, (t) = Taken By, (c) = Cosigned By      Initials Name Provider Type    Kylie Mensah, PT Physical Therapist                   Goals/Plan    No documentation.                  Clinical Impression       Row Name 11/11/24 1150          Pain    Pre/Posttreatment Pain Comment generalized R UE discomfort  -CB       Row Name 11/11/24 1150          Plan of Care Review    Plan of Care Reviewed With patient  -CB     Progress no change  -CB     Outcome Evaluation Patient participated with PT/OT this AM. She was Evert for STS with HHA and min-modA for standing balance as she is unsteady. She ambulated 15ft,10ft, and 40ft using HHA requiring min-modA. Pt is very unsteady during turns and VC to push through LUE with HHA. Pt will continue to benefit from skilled PT. Plan SNF.  -CB       Row Name 11/11/24 1150          Positioning and Restraints    Pre-Treatment Position in bed  -CB     Post Treatment Position chair  -CB     In Chair notified nsg;reclined;call light within reach;encouraged to call for assist;exit alarm on;with family/caregiver;RUE elevated  -CB               User Key  (r) = Recorded By, (t) = Taken By, (c) = Cosigned By      Initials Name Provider Type    Kylie Mensah, PT Physical Therapist                   Outcome Measures       Row Name 11/11/24 1153 11/11/24 0823       How much help from another person do you currently need...    Turning from your back to your side while in flat bed without using bedrails? 3  -CB 3  -SH    Moving from lying on back to sitting on the side of a flat bed without bedrails? 3  -CB 3  -SH    Moving to and from a bed to a chair (including a wheelchair)? 3  -CB 3  -SH    Standing up from a chair using your arms (e.g., wheelchair,  bedside chair)? 3  -CB 3  -SH    Climbing 3-5 steps with a railing? 2  -CB 2  -SH    To walk in hospital room? 2  -CB 2  -SH    AM-PAC 6 Clicks Score (PT) 16  -CB 16  -SH    Highest Level of Mobility Goal 5 --> Static standing  -CB 5 --> Static standing  -SH      Row Name 11/11/24 1153          Functional Assessment    Outcome Measure Options AM-PAC 6 Clicks Basic Mobility (PT)  -CB               User Key  (r) = Recorded By, (t) = Taken By, (c) = Cosigned By      Initials Name Provider Type    CB Kylie Corcoran, PT Physical Therapist    SH Shi Mason, RN Registered Nurse                                 Physical Therapy Education       Title: PT OT SLP Therapies (Done)       Topic: Physical Therapy (Done)       Point: Mobility training (Done)       Learning Progress Summary            Patient Acceptance, E,TB, VU,NR by CB at 11/11/2024 1153    Acceptance, E,TB, VU,NR by CB at 11/9/2024 1538    Acceptance, E,D, VU,NR by MS at 11/7/2024 1452    Acceptance, E,D, VU,NR by MS at 11/5/2024 1019    Acceptance, E,D, VU,NR by MS at 11/4/2024 1005    Acceptance, E, VU,NR by CN at 11/2/2024 1623                      Point: Home exercise program (Done)       Learning Progress Summary            Patient Acceptance, E,D, VU,NR by MS at 11/7/2024 1452    Acceptance, E,TB,D, VU,NR by CB at 11/6/2024 1036    Acceptance, E,D, VU,NR by MS at 11/4/2024 1005    Acceptance, E, VU,NR by CN at 11/2/2024 1623                      Point: Body mechanics (Done)       Learning Progress Summary            Patient Acceptance, E,TB, VU,NR by CB at 11/11/2024 1153    Acceptance, E,TB, VU,NR by CB at 11/9/2024 1538    Acceptance, E,D, VU,NR by MS at 11/7/2024 1452    Acceptance, E,D, VU,NR by MS at 11/5/2024 1019    Acceptance, E,D, VU,NR by MS at 11/4/2024 1005    Acceptance, E, VU,NR by CN at 11/2/2024 1623                      Point: Precautions (Done)       Learning Progress Summary            Patient Acceptance, E,TB, VU,NR by CB at  11/11/2024 1153    Acceptance, E,TB, VU,NR by CB at 11/9/2024 1538    Acceptance, E,D, VU,NR by MS at 11/7/2024 1452    Acceptance, E,D, VU,NR by MS at 11/5/2024 1019    Acceptance, E,D, VU,NR by MS at 11/4/2024 1005    Acceptance, E, VU,NR by CN at 11/2/2024 1623                                      User Key       Initials Effective Dates Name Provider Type Discipline    MS 06/16/21 -  Dwain Hendricks, PT Physical Therapist PT    CN 06/16/21 -  Meg Alan, PT Physical Therapist PT    CB 10/22/21 -  Kylie Corcoran PT Physical Therapist PT                  PT Recommendation and Plan     Progress: no change  Outcome Evaluation: Patient participated with PT/OT this AM. She was Evert for STS with HHA and min-modA for standing balance as she is unsteady. She ambulated 15ft,10ft, and 40ft using HHA requiring min-modA. Pt is very unsteady during turns and VC to push through LUE with HHA. Pt will continue to benefit from skilled PT. Plan SNF.     Time Calculation:         PT Charges       Row Name 11/11/24 1153             Time Calculation    Start Time 0820  -CB      Stop Time 0843  -CB      Time Calculation (min) 23 min  -CB      PT Received On 11/11/24  -CB      PT - Next Appointment 11/12/24  -CB         Time Calculation- PT    Total Timed Code Minutes- PT 23 minute(s)  -CB         Timed Charges    85218 - PT Therapeutic Activity Minutes 23  -CB         Total Minutes    Timed Charges Total Minutes 23  -CB       Total Minutes 23  -CB                User Key  (r) = Recorded By, (t) = Taken By, (c) = Cosigned By      Initials Name Provider Type    CB Kylie Corcoran, PT Physical Therapist                  Therapy Charges for Today       Code Description Service Date Service Provider Modifiers Qty    [id number] HC PT THERAPEUTIC ACT EA 15 MIN 11/11/2024 Kylie Corcoran, PT GP 2            PT G-Codes  Outcome Measure Options: AM-PAC 6 Clicks Basic Mobility (PT)  AM-PAC 6 Clicks Score (PT): 16  AM-PAC 6 Clicks  Score (OT): 7  PT Discharge Summary  Anticipated Discharge Disposition (PT): skilled nursing facility    Kylie Corcoran, PT  11/11/2024

## 2024-11-11 NOTE — CASE MANAGEMENT/SOCIAL WORK
Continued Stay Note  [facility]     Patient Name: Madelyn Madrid  MRN: [id number]  Today's Date: 11/11/2024    Admit Date: 10/31/2024    Plan: [facility]- accepted and bed available Friday and over the weekend   Discharge Plan       Row Name 11/11/24 1423       Plan    Plan Comments DC orders in EPIC. Spoke with [facility] and they can accept today. DC summary and recent clinicals faxed to her at [phone]. Family updated at bedside and they will transport. [facility] and [facility]. [facility] has their own pharmacy and will fill medications there.                   Discharge Codes    No documentation.                 Expected Discharge Date and Time       Expected Discharge Date Expected Discharge Time    Nov 11, 2024               Jennifer Medrano RN

## 2024-11-11 NOTE — PROGRESS NOTES
"[facility] Clinical Pharmacy Services: Vancomycin Monitoring Note    Madelyn Madrid is a 84 y.o. female who is on day 5/14 of pharmacy to dose vancomycin for R arm postoperative cellulitis.     Previous Vancomycin Dose: 1250 mg IV every 12 hours    Updated Cultures and Sensitivities:   11/5 BCx x2 sets NGTD   11/6: MRSA PCR-negative    Results from last 7 days   Lab Units 11/10/24  2014 11/08/24  1148   VANCOMYCIN TR mcg/mL 14.30 7.50     Vitals/Labs  Ht: 157.5 cm (62.01\"); Wt: 49 kg (108 lb 0.4 oz)   Temp Readings from Last 1 Encounters:   11/10/24 97.7 °F (36.5 °C) (Oral)     Estimated Creatinine Clearance: 76.3 mL/min (A) (by C-G formula based on SCr of 0.44 mg/dL (L)).     Results from last 7 days   Lab Units 11/10/24  0349 11/09/24  0451 11/08/24  0537   CREATININE mg/dL 0.44* 0.35* 0.38*   WBC 10*3/mm3 22.46* 22.96* 24.14*     Assessment/Plan    Current Vancomycin Dose: Renal function stable. Continue 1250 mg IV every 12 hours; provides a predicted  mg/L.hr   Next Level Date and Time: No levels currently pending. ID has noted potential stop date of 11/11, will follow daily until d/c and order levels as appropriate.  We will continue to monitor patient changes and renal function until vancomycin is discontinued or patient is discharged    Thank you for involving pharmacy in this patient's care. Please contact pharmacy with any questions or concerns.       Sherman Knox, [facility]  11/10/24 21:33 EST      "

## 2024-11-11 NOTE — DISCHARGE SUMMARY
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 [C79.51, C80.1]  Yes    Localized swelling of right upper extremity [R22.31]  Yes    Chronic diastolic CHF (congestive heart failure) [I50.32]  Yes    Anemia [D64.9]  Yes    Chronic heart failure with preserved ejection fraction (HFpEF) [I50.32]  Yes    Severe malnutrition [E43]  Yes    Surgery, elective [Z41.9]  Not Applicable    Hypertension [I10]  Yes    GERD (gastroesophageal reflux disease) [K21.9]  Yes    Depression [F32.A]  Yes    COPD (chronic obstructive pulmonary disease) [J44.9]  Yes      Resolved Hospital Problems    Diagnosis Date Resolved POA    Hypokalemia [E87.6] 2024 Unknown    Hyponatremia [E87.1] 2024 Unknown        Hospital Course     Brief admission history and physical.  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  There was inability to wean the patient off oxygen associated with confusion and [facility] was asked to admit her to the hospital.  Physical examination on admission revealed an afebrile patient with stable vital signs and a pulse ox of 97% on 4 L.  Rest of the examination is remarkable for disorientation and confusion with paranoia.  Hospital course.  Initial evaluation included a CBC that was normal except a white count of 11.6.  CMP was normal except a glucose of 146, sodium 135, potassium 3.3, chloride 95, AST 47, anion gap 16.4.  Arterial blood gas on 2 L revealed a pH of 7.39, pCO2 of 38, pO2 of 82.   Magnesium and phosphorus were normal.  Chest x-ray with cardiomegaly but no acute disease.  Hospital course will be summarized in a problem oriented manner as below  1.  Postoperative encephalopathy/hypoxemia.  This was most likely secondary to anesthesia.  CNS examination was negative.  Has resolved with conservative measures soon after the operation.  2.  Swelling of the right upper extremity in a patient with a history of metastatic adenocarcinoma of the lung [place] to pathologic and malignant fracture of the right humerus status post hardware implant as mentioned above patient had pathological fracture of her right humerus and orthopedic was consulted and they performed hardware implant and this was complicated by significant right upper extremity swelling.  She did not have any signs of compartment syndrome.  A CT scan of the right upper extremity revealed no evidence of abscess but positive diffuse edema and destructive metastatic bone lesions in the proximal left humerus associated with the pathological fracture and the fixation hardware in place.  Orthopedic and infectious disease did not feel that this is an infection but rather a stratification of the monometer through the muscular tissue.  Secondary to her leukocytosis however she was empirically placed on broad-spectrum antibiotic and eventually infectious disease DC'd antibiotic and did not recommend any oral antibiotics.  A right upper extremity venous Doppler was negative for DVT.  She was placed on p.o. prednisone.  Swelling slightly improved.  Both orthopedic and ID okay to discharge.  Physical therapy Occupational Therapy recommend SNF facility.  3.  History of hypertension and chronic diastolic congestive heart failure blood pressure remained under good control with no evidence of angina or congestive heart failure during this admission.  Norvasc was maintained  3.  History of COPD.  This is by imaging.  Patient is on no treatment.  Patient  respiratory status remained stable.  4.  Hyponatremia and hyperkalemia.  She developed hyponatremia during this admission that has spontaneously resolved with discontinuation of the fluid restriction and developed hyperkalemia that has also spontaneously resolved.  Nephrology followed up with the patient.  TSH and cortisol were normal.  5.  Postoperative anemia.  She developed postoperative anemia and her hemoglobin remained stable did not require any transfusion during this admission.    At the time of discharge patient was hemodynamically stable.  She is to follow-up with hematology oncology/orthopedic/nephrology/primary MD.  Patient is being discharged to SNF for continuation of physical therapy.  Patient is to use right upper extremity sling and to have nonweightbearing of the right upper extremity  Consultants     Consult Orders (all) (From admission, onward)       Start     Ordered    11/06/24 0953  Inpatient Infectious Diseases Consult  Once        Specialty:  Infectious Diseases  Provider:  Nancy Soria MD    11/06/24 0952    11/06/24 0927  Inpatient Orthopedic Surgery Consult  Once        Specialty:  Orthopedic Surgery  Provider:  Elmo Altman MD    11/06/24 0926    11/04/24 0756  Inpatient Nephrology Consult  Once        Specialty:  Nephrology  Provider:  Baudilio Alfredo MD    11/04/24 0755    11/03/24 1137  Inpatient Nutrition Consult  Once        Provider:  (Not yet assigned)    11/03/24 1136    11/02/24 1526  Inpatient Neurology Consult General  Once        Specialty:  Neurology  Provider:  Joana Morgan MD    11/02/24 1525    11/02/24 1524  Inpatient Neurology Consult General  Once,   Status:  Canceled        Specialty:  Neurology  Provider:  (Not yet assigned)    11/02/24 1525    11/02/24 0118  Inpatient Case Management  Consult  Once        Provider:  (Not yet assigned)    11/02/24 0117    11/01/24 0030  Inpatient Consult to Hospitalist  Once        Specialty:  Hospitalist   Provider:  Miles Dietrich MD    11/01/24 0029                  Procedures     Imaging Results (All)       Procedure Component Value Units Date/Time    CT Upper Extremity Right Without Contrast [[id number]] Collected: 11/06/24 0842     Updated: 11/06/24 0908    Narrative:      CT UPPER EXTREMITY RIGHT WO CONTRAST-     DATE OF EXAM: 11/6/2024 7:51 AM     INDICATION: Pathologic fracture of right humerus status post surgery.   Ordered for signs of infection/abscess.     COMPARISON: Chest radiographs 11/5/2024 and 10/31/2024. Right humerus  radiographs 10/31/2024. PET/CT 10/22/2024. CT chest 10/22/2024.     TECHNIQUE: Multiple contiguous axial images were acquired through the  right upper extremity from above the shoulder through the humerus,  elbow, and forearm to the radiocarpal joint without the intravenous  administration of contrast. Reformatted coronal and sagittal sequences  were also reviewed. Radiation dose reduction techniques were utilized,  including automated exposure control and exposure modulation based on  body size.     FINDINGS:  Increased size of the large solid destructive mass in the proximal right  femoral diaphysis, now measuring 5.7 cm x 3.5 cm (axial series 3 image  57), previously 4.5 cm x 3.1 cm. The mass measures approximately 7 cm in  length (sagittal series 7 image 43). The mass results in mass effect on  the adjacent deltoid and biceps musculature with no fat plane between  the mass and the musculature. Associated lytic destructive osseous  changes of the proximal humerus and mildly displaced pathologic fracture  with up to 1 cm posterior displacement and impaction of the distal  fracture fragment. [employer] bone stabilization hardware in place,  across the metastatic lesion, the pathologic fracture, and throughout  the humeral marrow space. Likely postoperative air in the bone marrow  space.     Diffuse anasarca and soft tissue edema, greatest throughout the right  upper extremity.  Heterogeneous enlargement of the triceps musculature,  which could represent edema, myositis, and/or intramuscular hematoma. No  well-formed or drainable fluid collection definitive for abscess.  Partially imaged large heterogeneous thyroid goiter. Similar-appearing  2.5 cm subpleural mass in the infrahilar posterior and medial right  lower lobe, probable primary lung malignancy, with partially visualized  right hilar metastatic lymphadenopathy. Right basilar subsegmental  atelectasis and/or scarring. Similar-appearing chronic emphysematous  changes in the right lung with right apical pleural-parenchymal  scarring. Incompletely imaged heterogeneous low-attenuation metastatic  liver lesion in the anterior right lobe of the liver.     Incompletely imaged lytic metastatic bone lesions in the T5 and T9  vertebral bodies with associated pathologic fracture.     Mild to moderate glenohumeral joint DJD. No significant glenohumeral  joint effusion. Mild to moderate DJD at the acromioclavicular joint.  Mild DJD at the elbow. Neutral ulnar variance.       Impression:         1. Increased size of the large destructive metastatic bone lesion in the  proximal humeral diaphysis with associated pathologic fracture and  fixation hardware in place. Air within the marrow space is likely  postoperative.  2. Nonspecific diffuse osseous enlargement of the triceps musculature,  which could reflect edema, myositis, and/or intramuscular hematoma.  3. Diffuse soft tissue edema with no well-formed or drainable fluid  collection definitive for abscess, although evaluation is mild limited  by the lack of intravenous contrast.  4. Similar-appearing likely malignant subpleural mass in the posterior  and medial right lower lobe and partially visualized likely metastatic  right hilar lymphadenopathy, metastatic bone lesions in the thoracic  spine, and hepatic metastatic disease.     This report was finalized on 11/6/2024 9:05 AM by Harvey Greer MD  on  Workstation: [id number]       XR Chest 1 View [[id number]] Collected: 11/05/24 1621     Updated: 11/05/24 1631    Narrative:      XR CHEST 1 VW-     HISTORY: Female who is 84 years-old, infectious work-up     TECHNIQUE: 2 view of the chest     COMPARISON: 10/31/2024     FINDINGS: Patient is rotated towards the right. The heart size appears  borderline. Pulmonary vasculature is unremarkable. Aorta is tortuous,  calcified. Previous CT demonstrated medial right lower lobe pulmonary  mass is not well characterized on this exam. No acute infiltrate,  pleural effusion, or pneumothorax. Right hemidiaphragm  is elevated. Surgical change of the right humerus is noted, at the level  of previously demonstrated pathologic fracture.       Impression:      No acute infiltrate. Borderline heart size. Tortuous aorta. Follow-up as  clinically indicated.     This report was finalized on 11/5/2024 4:28 PM by Dr. Wilmer Cosme M.D on Workstation: [id number]       XR Chest 1 View [[id number]] Collected: 10/31/24 2052     Updated: 10/31/24 2100    Narrative:      XR CHEST 1 VW-     HISTORY: Female who is 84 years-old, hypoxia     TECHNIQUE: Frontal view of the chest     COMPARISON: 10/23/2024     FINDINGS: The heart is enlarged. Pulmonary vasculature is unremarkable.  Aorta is tortuous, calcified. Previous CT demonstrated medial right  lower lobe pulmonary mass is not well characterized on this exam. No  acute infiltrate, pleural effusion, or pneumothorax. Right hemidiaphragm  is elevated. Surgical change of the right humerus is noted, at the level  of previously demonstrated pathologic fracture.       Impression:      No acute infiltrate. Cardiomegaly. Tortuous aorta. Follow-up  as clinically indicated.     This report was finalized on 10/31/2024 8:57 PM by Dr. Wilmer Cosme M.D on Workstation: [id number]       XR Humerus Right [[id number]] Collected: 10/31/24 1819     Updated: 10/31/24 1827    Narrative:      XR HUMERUS  RIGHT-     INDICATIONS: Postoperative evaluation.     TECHNIQUE: 3 views of the right humerus     COMPARISON: 10/31/2024 at 1607 hours     FINDINGS:     Postsurgical change of the right humerus is noted, with radiodense  marker extending across the pathologic fracture of the proximal humeral  shaft. Cortical step-off at the medial aspect of the fracture is  measured at 6 mm. Surgical soft tissue gas is evident.          Impression:         Postsurgical changes.           This report was finalized on 10/31/2024 6:24 PM by Dr. Wilmer Cosme M.D on Workstation: [id number]       FL C Arm During Surgery [[id number]] Resulted: 10/31/24 1723     Updated: 10/31/24 1723    Narrative:      This procedure was auto-finalized with no dictation required.            Pertinent Labs     Results from last 7 days   Lab Units 11/11/24  0327 11/10/24  0349 11/09/24  0451 11/08/24  0537   WBC 10*3/mm3 20.78* 22.46* 22.96* 24.14*   HEMOGLOBIN g/dL 10.1* 11.0* 9.9* 10.1*   PLATELETS 10*3/mm3 468* 526* 415 363     Results from last 7 days   Lab Units 11/11/24  0327 11/10/24  1002 11/10/24  0349 11/09/24  0451 11/08/24  0537   SODIUM mmol/L 136  --  135* 136 132*   POTASSIUM mmol/L 4.9 3.6 5.8* 4.2 3.9   CHLORIDE mmol/L 98  --  100 102 99   CO2 mmol/L 26.8  --  24.0 27.0 23.0   BUN mg/dL 12  --  14 10 9   CREATININE mg/dL 0.41*  --  0.44* 0.35* 0.38*   GLUCOSE mg/dL 125*  --  141* 170* 100*   Estimated Creatinine Clearance: 81.8 mL/min (A) (by C-G formula based on SCr of 0.41 mg/dL (L)).  Results from last 7 days   Lab Units 11/11/24  0327 11/10/24  0349 11/09/24  0451 11/08/24  0537 11/07/24 0348 11/06/24  0325   ALBUMIN g/dL 2.6* 2.5* 2.3* 2.0*   < > 2.3*  1.9*   BILIRUBIN mg/dL  --  0.2  --   --   --  0.2   ALK PHOS U/L  --  172*  --   --   --  131*   AST (SGOT) U/L  --  32  --   --   --  18   ALT (SGPT) U/L  --  26  --   --   --  6    < > = values in this interval not displayed.     Results from last 7 days   Lab Units 11/11/24  0321  "11/10/24  0349 11/09/24  0451 11/08/24  0537   CALCIUM mg/dL 8.4* 8.2* 7.6* 7.3*   ALBUMIN g/dL 2.6* 2.5* 2.3* 2.0*   MAGNESIUM mg/dL 1.8 2.0 1.7 2.3   PHOSPHORUS mg/dL 4.0 3.6 3.6 3.1         Results from last 7 days   Lab Units 11/05/24  0824   SODIUM UR mmol/L <20   OSMOLALITY UR mOsm/kg 544         Invalid input(s): \"LDLCALC\"  Results from last 7 days   Lab Units 11/05/24  1614 11/05/24  1613   BLOODCX  No growth at 5 days No growth at 5 days     Imaging Results (Last 24 Hours)       ** No results found for the last 24 hours. **            Test Results Pending at Discharge         Discharge Exam   Physical Exam  Vitals.  Temperature 97 a pulse of 106 respiratory rate of 16 blood pressure 147/65 and O2 sats of 96% on room air  General.  Elderly female.  Alert and oriented x 4.  In no apparent pain/distress/diaphoresis.  Normal mood and affect.  Eyes.  Pupils equal round and reactive.  Intact extraocular musculature.  No pallor or jaundice.  Oral cavity.  Moist mucous membrane with no lesions.  Neck.  Supple.  No JVD.  No lymphadenopathy or thyromegaly.  Cardiovascular.  Regular rate and rhythm and grade 2systolic murmur.  Chest.  Clear to auscultation bilaterally with no added sounds.  Abdomen.  Soft lax.  No tenderness.  No organomegaly.  No guarding or rebound  Extremities.  No clubbing/cyanosis.  +1 to +2 right upper extremity edema (improved from the last 2 days)..  Intact distal pulses and capillary refill of the right upper extremity.  Normal power of the right hand.  CNS.  No acute focal neurological deficits     Discharge Details        Discharge Medications        New Medications        Instructions Start Date   docusate sodium 250 MG capsule  Commonly known as: COLACE   250 mg, Oral, 2 Times Daily PRN      HYDROcodone-acetaminophen 5-325 MG per tablet  Commonly known as: NORCO   1 tablet, Oral, Every 4 Hours PRN      methylPREDNISolone 4 MG dose pack  Commonly known as: MEDROL   Take as directed on " package instructions.      naloxone 4 MG/0.1ML nasal spray  Commonly known as: NARCAN   Call 911. Don't prime. [place] in 1 nostril for overdose. Repeat in 2-3 minutes in other nostril if no or minimal breathing/responsiveness.      ondansetron 4 MG tablet  Commonly known as: Zofran   4 mg, Oral, Every 8 Hours PRN             Continue These Medications        Instructions Start Date   amLODIPine 10 MG tablet  Commonly known as: NORVASC   Take 1 tablet by mouth Daily.      escitalopram 20 MG tablet  Commonly known as: LEXAPRO   Take 1 tablet by mouth Every Morning.      OMEPRAZOLE PO   20 mg, Daily PRN      traMADol 50 MG tablet  Commonly known as: ULTRAM   50 mg, Oral, Every 6 Hours PRN      Tylenol 325 MG tablet  Generic drug: acetaminophen   650 mg, Every 6 Hours PRN               Allergies   Allergen Reactions    Other Nausea And Vomiting and GI Intolerance     Opioids--         Discharge Disposition:  Condition: Stable    Diet:   Diet Order   Procedures    Diet: Regular/House; Fluid Consistency: Thin (IDDSI 0)       Activity:   Activity Instructions       Activity as Tolerated      Discharge Activity      1) No driving while taking narcotics.   2)NWB RUE  3) May shower / sponge bath do not fully submerse in water  4) Ambulate as tolerate  5) Sling for comfort  6) hand wrist elbow and shoulder range of motion as tolerated.    Discharge Activity      1) No driving while taking narcotics.   2) Nonweightbearing RUE, sling for comfort  3) May shower   4) Hand, wrist, elbow, and shoulder ROM.  No shoulder/elbow resistance    Other Activity Instructions      Activity Instructions: PT and OT to evaluate and treat    Up WIth Assist                CODE STATUS:    Code Status and Medical Interventions: CPR (Attempt to Resuscitate); Full   Ordered at: 10/31/24 1950     Code Status (Patient has no pulse and is not breathing):    CPR (Attempt to Resuscitate)     Medical Interventions (Patient has pulse or is breathing):    Full        Future Appointments   Date Time Provider Department Center   11/11/2024  7:00 PM [facility]   2/5/2025  1:45 PM Deann Iqbal MD [facility]   4/1/2025 12:45 PM Treva Grullon MD [facility]     Additional Instructions for the Follow-ups that You Need to Schedule       Ambulatory Referral to Home Health   As directed      Face to Face Visit Date: 11/11/2024   Follow-up provider for Plan of Care?: I treated the patient in an acute care facility and will not continue treatment after discharge.   Follow-up provider: KATIANA SANDOVAL [[id number]]   Reason/Clinical Findings: Right upper extremity swelling after internal fixation of pathological fracture of the right upper extremity.   Describe mobility limitations that make leaving home difficult: As above   Nursing/Therapeutic Services Requested: Skilled Nursing Physical Therapy Occupational Therapy   Skilled nursing orders: Other   PT orders: Gait Training Transfer training Therapeutic exercise Strengthening   Weight Bearing Status: As Tolerated   Occupational orders:  (Bearing of the right upper extremity)   Frequency: 1 Week 1        Call MD With Problems / Concerns   As directed      Instructions: Call return to ER if increasing pain and swelling  or discoloration of right upper extremity.  Fever or chills.  Chest pain.  Shortness of breath.  Palpitation.  No nausea or vomiting.    Order Comments: Instructions: Call return to ER if increasing pain and swelling  or discoloration of right upper extremity.  Fever or chills.  Chest pain.  Shortness of breath.  Palpitation.  No nausea or vomiting.         Discharge Follow-up with PCP   As directed       Currently Documented PCP:    Katiana Sandoval MD    PCP Phone Number:    [phone]     Follow Up Details: 1.  Right upper extremity swelling/metastatic adenocarcinoma of the lung/pathological fracture right humerus status post hardware/HTN/chronic diastolic  CHF/GERD/COPD/anemia/leukocytosis        Discharge Follow-up with Specified Provider: Dr. Altman in 6 weeks   As directed      To: Dr. Altman in 6 weeks        Discharge Follow-up with Specified Provider: Follow up with Dr. Altman in 6 weeks; 6 Weeks   As directed      To: Follow up with Dr. Altman in 6 weeks   Follow Up: 6 Weeks        Discharge Follow-up with Specified Provider: Hematology oncology; 2 Weeks   As directed      To: Hematology oncology   Follow Up: 2 Weeks   Follow Up Details: Metastatic adenocarcinoma of the lung to the right humerus        Discharge Follow-up with Specified Provider: Nephrology; 2 Weeks   As directed      To: Nephrology   Follow Up: 2 Weeks   Follow Up Details: Hyponatremia and hypokalemia        Discharge Follow-up with Specified Provider: Orthopedic; 2 Weeks   As directed      To: Orthopedic   Follow Up: 2 Weeks   Follow Up Details: Right upper extremity pathological fracture status post hardware associated with swelling of the right upper extremity        Dressing Change Instructions   As directed      1.   Remove surgical dressing in approximately 3 to 5 days.    2.  Apply a clean dry dressing thereafter.    3.  Do not apply any ointments or creams to the incision.    4.  Monitor site for signs of infection which include redness warmth persistent drainage, purulent drainage    Order Comments: 1.   Remove surgical dressing in approximately 3 to 5 days.  2.  Apply a clean dry dressing thereafter.  3.  Do not apply any ointments or creams to the incision.  4.  Monitor site for signs of infection which include redness warmth persistent drainage, purulent drainage         Dressing Change Instructions   As directed      1.   Remove surgical dressing in approximately 3 to 5 days.    2.  Apply a clean dry dressing thereafter.    3.  Do not apply any ointments or creams to the incision.    4.  Monitor site for signs of infection which include redness warmth persistent drainage, purulent  drainage    Order Comments: 1.   Remove surgical dressing in approximately 3 to 5 days.  2.  Apply a clean dry dressing thereafter.  3.  Do not apply any ointments or creams to the incision.  4.  Monitor site for signs of infection which include redness warmth persistent drainage, purulent drainage         Notify Physician or Go To The ED For the Following Conditions   As directed      Go to your local ER if you develop chest pain or shortness of breath.    Call Dr. Altman if you develop:  1)  Fever greater than 101  2)  Sweat or chills  3)  Uncontrolled pain  4)  Lower extremity swelling that does not improved with elevation, or calf tenderness  5)  Reddness, warmth, drainage or purulence from the operative site  6)  Any questions or concerns.    Order Comments: Go to your local ER if you develop chest pain or shortness of breath.  Call Dr. Altman if you develop: 1)  Fever greater than 101 2)  Sweat or chills 3)  Uncontrolled pain 4)  Lower extremity swelling that does not improved with elevation, or calf tenderness 5)  Reddness, warmth, drainage or purulence from the operative site 6)  Any questions or concerns.         Notify Physician or Go To The ED For the Following Conditions   As directed      Go to your local ER if you develop chest pain or shortness of breath.    Call Dr. Altman if you develop:  1)  Fever greater than 101  2)  Sweat or chills  3)  Uncontrolled pain  4)  Lower extremity swelling that does not improved with elevation, or calf tenderness  5)  Reddness, warmth, drainage or purulence from the operative site  6)  Any questions or concerns.    Order Comments: Go to your local ER if you develop chest pain or shortness of breath.  Call Dr. Altman if you develop: 1)  Fever greater than 101 2)  Sweat or chills 3)  Uncontrolled pain 4)  Lower extremity swelling that does not improved with elevation, or calf tenderness 5)  Reddness, warmth, drainage or purulence from the operative site 6)  Any questions or  concerns.                Contact information for follow-up providers       Therese Martinez MD .    Specialty: Family Medicine  Why: 1.  Right upper extremity swelling/metastatic adenocarcinoma of the lung/pathological fracture right humerus status post hardware/HTN/chronic diastolic CHF/GERD/COPD/anemia/leukocytosis  Contact information:  [address] [address] [phone]                       Contact information for after-discharge care       Destination       [facility] .    Service: Skilled Nursing  Contact information:  [address] [facility] [place] [address] [phone]                                     Time Spent on Discharge:  Greater than 30 minutes      Jose De Jesus Mora MD  [facility]  11/11/24  12:24 EST

## 2024-11-11 NOTE — OUTREACH NOTE
Prep Survey      Flowsheet Row Responses   Spiritism facility patient discharged from? [facility]   Is LACE score < 7 ? No   Eligibility Not Eligible   What are the reasons patient is not eligible? [facility]acute [facility] Center  [[facility]]   Does the patient have one of the following disease processes/diagnoses(primary or secondary)? Other   Prep survey completed? Yes            Tori LALA - Registered Nurse

## 2024-11-12 NOTE — CASE MANAGEMENT/SOCIAL WORK
Case Management Discharge Note      Final Note: dc to skilled bed at [facility] via family transport    Provided Post Acute Provider List?: Yes  Post Acute Provider List: Nursing Home  Provided Post Acute Provider Quality & Resource List?: Yes  Post Acute Provider Quality and Resource List: Nursing Home  Delivered To: Support Person  Support Person: spouse Adam Madrid [phone]  Method of Delivery: In person    Selected Continued Care - Discharged on 11/11/2024 Admission date: 10/31/2024 - Dis[place] disposition[place] [facility]      [place]ination Coordination complete.      Service Provider Services Address Phone Fax Patient Preferred    [facility] Skilled Nursing [address] [place] [id number] [phone]               Durable Medical Equipment    No services have been selected for the patient.                Dialysis/Infusion    No services have been selected for the patient.                Home Medical Care    No services have been selected for the patient.                Therapy    No services have been selected for the patient.                Community Resources    No services have been selected for the patient.                Community & DME    No services have been selected for the patient.                    Transportation Services  Private: Car    Final Discharge Disposition Code: 03 - skilled nursing facility (SNF)

## 2024-11-13 ENCOUNTER — TELEPHONE (OUTPATIENT)
Dept: ONCOLOGY | Facility: CLINIC | Age: 84
End: 2024-11-13
Payer: MEDICARE

## 2024-11-13 NOTE — TELEPHONE ENCOUNTER
S/w . He said She is still at [facility]. I told him I would try to find an appt next week in hopes she will be out then. He v/u

## 2024-11-15 LAB
DNA RANGE(S) EXAMINED NAR: NORMAL
GENE DIS ANL INTERP-IMP: POSITIVE
GENE DIS ASSESSED: NORMAL
GENE MUT TESTED BLD/T: 8.9 M/MB
MSI CA SPEC-IMP: NORMAL
PD-L1 BY 22C3 TISS IMSTN DOC: POSITIVE
PD-L1 BY 28-8 TISS IMSTN DOC: POSITIVE
PD-L1 BY SP142 TISS IMSTN DOC: POSITIVE
PD-L1 BY SP142 TISS QL IMSTN: 10 %
PD-L1 BY SP263 TISS DOC: POSITIVE
REASON FOR STUDY: NORMAL
TEMPUS GERMLINE NOTE: NORMAL
TEMPUS LCA: NORMAL
TEMPUS PD-L1 (22C3) COMBINED POSITIVE SCORE: 40
TEMPUS PD-L1 (22C3) TUMOR PROPORTION SCORE: 35 %
TEMPUS PD-L1 (28-8) CELLS.PD-L1/100 VIAB TUM NFR TISS IMSTN: 20 %
TEMPUS PD-L1 (SP142) CELLS.PD-L1/100 VIAB TUM NFR TISS IMSTN: 30 %
TEMPUS PD-L1 (SP263) CELLS. PD-L1/100 VIAB TUM NFR TISS IMSTN: 30 %
TEMPUS PERTINENTNEGATIVES: NORMAL
TEMPUS PORTAL: NORMAL
TEMPUS THERAPY1: NORMAL
TEMPUS THERAPY2: NORMAL
TEMPUS THERAPY3: NORMAL
TEMPUS THERAPYCOUNT: 3
TEMPUS TRIALCOUNT: 3
TEMPUS TRIALMATCHES1: NORMAL
TEMPUS TRIALMATCHES2: NORMAL
TEMPUS TRIALMATCHES3: NORMAL

## 2024-11-18 ENCOUNTER — TELEPHONE (OUTPATIENT)
Dept: ONCOLOGY | Facility: CLINIC | Age: 84
End: 2024-11-18
Payer: MEDICARE

## 2024-11-18 NOTE — TELEPHONE ENCOUNTER
Per Dr. Ghosh, patient to be seen tomorrow at 1130.     Attempted to contact Jina with [facility]. No answer. Voicemail left requesting a callback tomorrow.

## 2024-11-18 NOTE — TELEPHONE ENCOUNTER
Caller: [facility]    Relationship to patient: KALEY Wolff call back number: [phone]    Chief complaint: CLINICAL    Type of visit: FOLLOW UP    Requested date: 11-     Additional notes:KALEY  CALLING FROM [facility] REQUESTING A APPOINTMENT WITH DR VALENCIA FOR PT TOMORROW IN [place], PT HAS BEEN EXPERIENCING BLEEDING FROM THE BLADDER AND SEEMS WEAKER     PLEASE CONTACT KALEY WITH [facility] -[phone]

## 2024-11-19 ENCOUNTER — OFFICE VISIT (OUTPATIENT)
Dept: ONCOLOGY | Facility: CLINIC | Age: 84
End: 2024-11-19
Payer: MEDICARE

## 2024-11-19 ENCOUNTER — TELEPHONE (OUTPATIENT)
Dept: ONCOLOGY | Facility: CLINIC | Age: 84
End: 2024-11-19
Payer: MEDICARE

## 2024-11-19 VITALS
HEIGHT: 62 IN | WEIGHT: 100 LBS | TEMPERATURE: 98.2 F | OXYGEN SATURATION: 94 % | BODY MASS INDEX: 18.4 KG/M2 | RESPIRATION RATE: 17 BRPM

## 2024-11-19 DIAGNOSIS — C80.1 MALIGNANT NEOPLASM METASTATIC TO PELVIC BONE WITH UNKNOWN PRIMARY SITE: Primary | ICD-10-CM

## 2024-11-19 DIAGNOSIS — C79.51 MALIGNANT NEOPLASM METASTATIC TO PELVIC BONE WITH UNKNOWN PRIMARY SITE: Primary | ICD-10-CM

## 2024-11-19 NOTE — TELEPHONE ENCOUNTER
Called and spoke to Jina with [facility] [place]. I informed her per Dr. Ghosh, we can see Mrs. Madrid at 1130 today if she is able to come; Jina confirmed and stated that the patient will be here at 1130. No further questions at this time.

## 2024-11-19 NOTE — PROGRESS NOTES
HEMATOLOGY ONCOLOGY OUTPATIENT FOLLOWUP       Patient name: Madelyn Madrid  : 1940  MRN: [id number]  Primary Care Physician: Therese Martinez MD  Referring Physician: Therese Martinez MD  Reason For Consult: Pathologic fracture of the right humerus.    History of Present Illness:    2024: In the office for the first time to investigate a pathologic fracture of the right humerus.  She dated the beginning of her present illness to sometime approximately a month to 2 months before this visit.  She started to have some discomfort, initially in the right hip and eventually in the right upper extremity.  It was difficult to localize the pain and it was not very intense until 1 morning, when she woke up with very intense pain in the right upper extremity, radiating to the chest.  She went to the primary care's office where she had some x-rays of the chest.  This reported nothing.  Eventually she sought attention from a different office where an x-ray of the right upper extremity was obtained and this time and expansile lesion was documented in the proximal right humerus, with an associated nondisplaced pathologic fracture.  On the right questioning she denied fevers.  She admitted to some nocturnal diaphoresis but it was not particularly worse at the time of this visit.  She had lost some weight and reported between 10 and 15 pounds in the previous 4 to 8 weeks.  She did not have any nausea or vomiting but admitted to some anorexia.  No chest pains, increasing cough or dyspnea.  No changes in her breast.  Denied abdominal pain.  Had not had melena or hematochezia and denied hematuria.  No edema.  She reported that her last mammogram had been many years before.  She had not had a colonoscopy, as well in many years.  She had had a hysterectomy for endometrial cancer more than 20 years before this visit.  On exam she appeared well-built and well-oriented.  She did not seem ill or in  distress.  No jaundice or pallor.  The breasts were symmetrically pendulous.  No ulceration or dimpling of the skin.  No nipple discharge on either side.  Palpation did not disclose a discrete nodule or tumor.  Lungs diminished bilaterally and heart regular.  Abdomen scaphoid.  Soft.  Not tender to palpation.  Liver and spleen not enlarged.  No edema.  The laboratory exams were reviewed.  I reviewed the notes and imaging studies from primary care.  Discussed with her.  Additional investigations will be obtained.    10/9/2024: In the office for follow up and to review the laboratory and imaging studies. She does not feel as well as before. She has had more pain and now complains of some discomfort in the mid back. She has been eating well. No fevers. Denies chest pain or abdominal pain. No diarrhea or dysuria. On exam appears chronically ill. No distress. No jaundice or pallor. Lungs diminished bilaterally and heart regular. Abdomen soft and without hepatomegaly or splenomegaly. No edema. Reviewed the laboratory exams. Reviewed the images and the reports of the imaging studies. Reviewed the images with her and her spouse. My clinical impression is that indeed she has metastatic lung cancer, however, metastatic endometrial cancer  is also a possibility. Needs a biopsy and have referred her to Dr. Iqbal for bronchoscopic biopsy. Prescribed tramadol and gave instructions. To see me with results.     11/19/2024: Feels poorly. She started radiation to the right hip and subsequently was taken to the operating room to have fixation of the right humeral fracture. This procedure was complicated by metabolic encephalopathy and she subsequently was discharged to have physical therapy. She tells me she has been very weak. She has wanted to stay in bed and is generally uncomfortable when out of bed. She has been eating regularly and has no nausea or vomiting, but the amount she eats is small at a given time. She has been drinking  Boost. She has no more dyspnea than before. She has been without chest pain. No abdominal pain. On exam she is pale and appears ill. She is transported in a wheel chair and her right upper extremity is in a sling. No acute distress. No oral lesions and no palpable adenopathy. The lungs are diminished but clear. The heart is regular. Abdomen is soft. No edema. Reviewed the records of the recent admission. Reviewed the reports of pathology and the imaging studies. Reviewed the images of the recent PET scan. From these scans, it becomes evident that her disease is much more widely metastatic than initially thought. Additional radiation might provide some benefit but because she is not in severe pain, its role is likely to be limited. Systemic treatment was discussed with her; I explained the side effects of the treatment and the requirements for treatment. The alternatives were explained. She is not ready to make a decision. She wants to go home and think about the options and discuss with her family. She is to see me in 2 weeks.     Past Medical History:   Diagnosis Date    Arthritis     COPD (chronic obstructive pulmonary disease) 10/2/24    CT scan    Depression     Emphysema of lung 10_2_24    CT scan    Endometrial cancer     GERD (gastroesophageal reflux disease)     Goiter     [place] (hard of hearing)     Hx of radiation therapy     GOES 5 TIMES A WEEK    Hypertension     Incontinence of urine     Lung mass     Pancreatitis      Past Surgical History:   Procedure Laterality Date    BRONCHOSCOPY WITH ION ROBOTIC ASSIST N/A 10/23/2024    Procedure: ROBOTIC BRONCHOSCOPY WITH FINE NEEDLE ASPIRATION AND CRYO BIOPSY, BRONCHOSCOPY WITH BRONCHOALVEOLAR LAVAGE;  Surgeon: Deann Iqbal MD;  Location: [facility] ENDOSCOPY;  Service: Robotics - Pulmonary;  Laterality: N/A;    COLONOSCOPY      HYSTERECTOMY      LAPAROSCOPY HYSTEROSCOPY ENDOMETRIAL ABLATION         Current Outpatient Medications:     acetaminophen (Tylenol) 325  MG tablet, Take 2 tablets by mouth Every 6 (Six) Hours As Needed for Mild Pain., Disp: , Rfl:     amLODIPine (NORVASC) 10 MG tablet, Take 1 tablet by mouth Daily., Disp: , Rfl:     docusate sodium (COLACE) 250 MG capsule, Take 1 capsule by mouth 2 (Two) Times a Day As Needed for Constipation., Disp: 30 capsule, Rfl: 0    escitalopram (LEXAPRO) 20 MG tablet, Take 1 tablet by mouth Every Morning., Disp: , Rfl:     HYDROcodone-acetaminophen (NORCO) 5-325 MG per tablet, Take 1 tablet by mouth Every 4 (Four) Hours As Needed (Pain)., Disp: 18 tablet, Rfl: 0    methylPREDNISolone (MEDROL) 4 MG dose pack, Take as directed on package instructions., Disp: 21 tablet, Rfl: 0    OMEPRAZOLE PO, Take 20 mg by mouth Daily As Needed., Disp: , Rfl:     ondansetron (Zofran) 4 MG tablet, Take 1 tablet by mouth Every 8 (Eight) Hours As Needed for Nausea or Vomiting., Disp: 30 tablet, Rfl: 0    traMADol (ULTRAM) 50 MG tablet, Take 1 tablet by mouth Every 6 (Six) Hours As Needed for Moderate Pain., Disp: 120 tablet, Rfl: 0    naloxone (NARCAN) 4 MG/0.1ML nasal spray, Call 911. Don't prime. [place] in 1 nostril for overdose. Repeat in 2-3 minutes in other nostril if no or minimal breathing/responsiveness. (Patient not taking: Reported on 11/19/2024), Disp: 2 each, Rfl: 0    Allergies   Allergen Reactions    Other Nausea And Vomiting and GI Intolerance     Opioids--     Family History   Problem Relation Age of Onset    Rectal cancer Mother     Alzheimer's disease Mother     Cancer Mother         colo-rectal    Cancer Father         Bone    Alzheimer's disease Sister     Alzheimer's disease Brother     Epilepsy Brother     Malig Hyperthermia Neg Hx      Cancer-related family history includes Cancer in her father and mother; Rectal cancer in her mother.    Social History     Tobacco Use    Smoking status: Former     Current packs/day: 0.00     Average packs/day: 1 pack/day for 40.0 years (40.0 ttl pk-yrs)     Types: Cigarettes     Start date:  1959     Quit date:      Years since quittin.9     Passive exposure: Past    Smokeless tobacco: Never   Vaping Use    Vaping status: Never Used   Substance Use Topics    Alcohol use: Not Currently    Drug use: Never     Social History     Social History Narrative    Not on file     ROS:   Review of Systems   Constitutional:  Positive for appetite change and unexpected weight change. Negative for activity change, chills, diaphoresis, fatigue and fever.   HENT:  Negative for congestion, dental problem, drooling, ear discharge, ear pain, facial swelling, hearing loss, mouth sores, nosebleeds, postnasal drip, rhinorrhea, sinus pressure, sinus pain, sneezing, sore throat, tinnitus, trouble swallowing and voice change.    Eyes:  Negative for photophobia, pain, discharge, redness, itching and visual disturbance.   Respiratory:  Negative for apnea, cough, choking, chest tightness, shortness of breath, wheezing and stridor.    Cardiovascular:  Negative for chest pain, palpitations and leg swelling.   Gastrointestinal:  Negative for abdominal distention, abdominal pain, anal bleeding, blood in stool, constipation, diarrhea, nausea, rectal pain and vomiting.   Endocrine: Negative for cold intolerance, heat intolerance, polydipsia and polyuria.   Genitourinary:  Negative for decreased urine volume, difficulty urinating, dysuria, flank pain, frequency, genital sores, hematuria and urgency.   Musculoskeletal:  Positive for arthralgias and myalgias. Negative for back pain, gait problem, joint swelling, neck pain and neck stiffness.   Skin:  Negative for color change, pallor and rash.   Neurological:  Negative for dizziness, tremors, seizures, syncope, facial asymmetry, speech difficulty, weakness, light-headedness, numbness and headaches.   Hematological:  Negative for adenopathy. Does not bruise/bleed easily.   Psychiatric/Behavioral:  Negative for agitation, behavioral problems, confusion, decreased concentration,  "hallucinations, self-injury, sleep disturbance and suicidal ideas. The patient is not nervous/anxious.      Objective:    Vital Signs:  Vitals:    11/19/24 1120   Resp: 17   Temp: 98.2 °F (36.8 °C)   SpO2: 94%   Weight: 45.4 kg (100 lb)  Comment: Verbal   Height: 157.5 cm (62\")   PainSc: 0-No pain     Body mass index is 18.29 kg/m².    ECOG  (1) Restricted in physically strenuous activity, ambulatory and able to do work of light nature    Physical Exam:   Physical Exam  Constitutional:       General: She is not in acute distress.     Appearance: She is ill-appearing. She is not toxic-appearing or diaphoretic.      Comments: Ill appearing woman. No acute distress. She's pale. Seems well hydrated. Transported in a wheel chair.    HENT:      Head: Normocephalic and atraumatic.      Right Ear: External ear normal.      Left Ear: External ear normal.      Nose: Nose normal.      Mouth/Throat:      Mouth: Mucous membranes are moist.      Pharynx: Oropharynx is clear. No oropharyngeal exudate or posterior oropharyngeal erythema.   Eyes:      General: No scleral icterus.        Right eye: No discharge.         Left eye: No discharge.      Conjunctiva/sclera: Conjunctivae normal.      Pupils: Pupils are equal, round, and reactive to light.   Cardiovascular:      Rate and Rhythm: Normal rate and regular rhythm.      Pulses: Normal pulses.      Heart sounds: No murmur heard.     No friction rub. No gallop.   Pulmonary:      Effort: No respiratory distress.      Breath sounds: No stridor. No wheezing, rhonchi or rales.      Comments: Ill appearing and slender woman. No distress.   Abdominal:      General: Bowel sounds are normal. There is no distension.      Palpations: Abdomen is soft. There is no mass.      Tenderness: There is no abdominal tenderness. There is no right CVA tenderness, left CVA tenderness, guarding or rebound.      Hernia: No hernia is present.      Comments: Scaphoid, soft, not tender.  Liver and spleen not " seemingly enlarged.   Musculoskeletal:         General: No tenderness, deformity or signs of injury.      Cervical back: No rigidity.      Right lower leg: No edema.      Left lower leg: No edema.      Comments: Right upper extremity immobilized in a sling.    Lymphadenopathy:      Cervical: No cervical adenopathy.   Skin:     Coloration: Skin is not jaundiced or pale.      Findings: No bruising, lesion or rash.   Neurological:      General: No focal deficit present.      Mental Status: She is alert and oriented to person, place, and time.      Cranial Nerves: No cranial nerve deficit.   Psychiatric:         Mood and Affect: Mood normal.         Behavior: Behavior normal.         Thought Content: Thought content normal.         Judgment: Judgment normal.     Urbano Ghosh MD performed the physical exam on 11/19/2024 as documented above.     Assessment & Plan     1.  Widely metastatic adenocarcinoma of lung: The immunohistochemical pattern of the malignancy was not specific for lung primary. However, the clinical history and the radiographic findings are consistent with primary lung. On this basis she has been offered treatment with chemotherapy and immunotherapy. Next generation sequencing has been requested. She is not sure she wants to pursue treatment. She is looking forward to discharge home and would like to discuss with her family and think about the options. I will see her again in approximately 2 weeks.   2.  Reviewed all the laboratory exams. Reviewed the notes from the recent admission to the hospital. Reviewed the images of the recent scans, including the PET scan. Discussed with her at length.   3.  She is to see me in approximately 2 weeks.     Urbano Ghosh MD on 11/19/2024 at 12:49 PM.

## 2024-11-19 NOTE — TELEPHONE ENCOUNTER
Caller: KALEY    Relationship: Other    Best call back number: [phone]    What is the best time to reach you: ANYTIME    Who are you requesting to speak with (clinical staff, provider,  specific staff member): CLINICAL    What was the call regarding: KALEY RETURNING CALL FOR [facility] REGARDING APPT TODAY WITH DR DOE - TRIED TO W/T TO OFFICE BUT NOT SUCCESSFUL.    PLEASE CALL ASAP.

## 2024-12-11 ENCOUNTER — PATIENT OUTREACH (OUTPATIENT)
Dept: ONCOLOGY | Facility: CLINIC | Age: 84
End: 2024-12-11
Payer: MEDICARE

## 2024-12-11 LAB
RAD ONC ARIA COURSE END DATE: NORMAL
RAD ONC ARIA COURSE ID: NORMAL
RAD ONC ARIA COURSE LAST TREATMENT DATE: NORMAL
RAD ONC ARIA COURSE START DATE: NORMAL
RAD ONC ARIA COURSE TREATMENT ELAPSED DAYS: 7
RAD ONC ARIA FIRST TREATMENT DATE: NORMAL
RAD ONC ARIA PLAN FRACTIONS TREATED TO DATE: 6
RAD ONC ARIA PLAN ID: NORMAL
RAD ONC ARIA PLAN NAME: NORMAL
RAD ONC ARIA PLAN PRESCRIBED DOSE PER FRACTION: 3 GY
RAD ONC ARIA PLAN PRIMARY REFERENCE POINT: NORMAL
RAD ONC ARIA PLAN TOTAL FRACTIONS PRESCRIBED: 10
RAD ONC ARIA PLAN TOTAL PRESCRIBED DOSE: 3000 CGY
RAD ONC ARIA REFERENCE POINT DOSAGE GIVEN TO DATE: 18 GY
RAD ONC ARIA REFERENCE POINT ID: NORMAL

## 2024-12-17 NOTE — PROGRESS NOTES
Radiation Treatment Summary Note      Patient Name: Madelyn Madrid  : 1940    Attending Provider: Zia Manzanares MD      Diagnosis:     ICD-10-CM ICD-9-CM   1. 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 Ultimately transitioned to hospice.     Concurrent Chemotherapy: No    Patient Tolerated Treatment Without Unexpected Side Effects/Complications: Yes    ECOG: Completely disabled; cannot carry on any selfcare; totally confined to bed or chair = 4    Pain Management Plan: Opioid or other pain medication prescribed by other provider    Follow-Up Plan: PRN    Imaging Ordered for Follow-Up: None/NA        Zia Manzanares MD

## 2024-12-26 ENCOUNTER — TELEPHONE (OUTPATIENT)
Dept: ONCOLOGY | Facility: CLINIC | Age: 84
End: 2024-12-26
Payer: MEDICARE

## 2024-12-26 NOTE — TELEPHONE ENCOUNTER
S/w Pt's  Adam and he informed me that Madelyn had passed on 12/11/24. I gave our condolences to him and his family

## 2025-01-19 NOTE — SIGNIFICANT NOTE
Adding to Navigation per Dr. Ghosh. Patient scheduled with Dr. Iqbal 10/16. Called and left patient a VM to call me back about appt.     Shelby Enrique  Lung Nurse Navigator   [facility]  [phone]  [email or web address]     Patient

## (undated) DEVICE — CATHETER GUIDE

## (undated) DEVICE — BAPTIST FLOYD BRONCHOSCOPY: Brand: MEDLINE INDUSTRIES, INC.

## (undated) DEVICE — SWIVEL CONNECTOR

## (undated) DEVICE — VISION PROBE ADAPTER AND SUCTION ADAPTER

## (undated) DEVICE — BIOPSY NEEDLE, 21G: Brand: FLEXISION

## (undated) DEVICE — VISION PROBE: Brand: ION

## (undated) DEVICE — Device: Brand: ION

## (undated) DEVICE — CATHETER: Brand: ION

## (undated) DEVICE — Device: Brand: ERBE

## (undated) DEVICE — FRCP BIOP SUPERDIMENSION PREMARK